# Patient Record
Sex: FEMALE | Race: WHITE | NOT HISPANIC OR LATINO | Employment: FULL TIME | URBAN - METROPOLITAN AREA
[De-identification: names, ages, dates, MRNs, and addresses within clinical notes are randomized per-mention and may not be internally consistent; named-entity substitution may affect disease eponyms.]

---

## 2019-05-24 ENCOUNTER — OFFICE VISIT - HEALTHEAST (OUTPATIENT)
Dept: CARDIOLOGY | Facility: CLINIC | Age: 39
End: 2019-05-24

## 2019-05-24 DIAGNOSIS — R07.89 OTHER CHEST PAIN: ICD-10-CM

## 2019-05-24 DIAGNOSIS — R00.2 PALPITATIONS: ICD-10-CM

## 2019-05-24 ASSESSMENT — MIFFLIN-ST. JEOR: SCORE: 1321.57

## 2019-05-28 ENCOUNTER — COMMUNICATION - HEALTHEAST (OUTPATIENT)
Dept: CARDIOLOGY | Facility: CLINIC | Age: 39
End: 2019-05-28

## 2019-06-11 ENCOUNTER — COMMUNICATION - HEALTHEAST (OUTPATIENT)
Dept: CARDIOLOGY | Facility: CLINIC | Age: 39
End: 2019-06-11

## 2019-07-15 ENCOUNTER — OFFICE VISIT (OUTPATIENT)
Dept: SLEEP MEDICINE | Facility: CLINIC | Age: 39
End: 2019-07-15
Payer: COMMERCIAL

## 2019-07-15 VITALS
HEART RATE: 62 BPM | HEIGHT: 69 IN | BODY MASS INDEX: 18.96 KG/M2 | RESPIRATION RATE: 14 BRPM | SYSTOLIC BLOOD PRESSURE: 113 MMHG | OXYGEN SATURATION: 100 % | DIASTOLIC BLOOD PRESSURE: 74 MMHG | WEIGHT: 128 LBS

## 2019-07-15 DIAGNOSIS — G47.33 OSA (OBSTRUCTIVE SLEEP APNEA): Primary | ICD-10-CM

## 2019-07-15 PROCEDURE — 99205 OFFICE O/P NEW HI 60 MIN: CPT | Performed by: PSYCHIATRY & NEUROLOGY

## 2019-07-15 RX ORDER — MULTIPLE VITAMINS W/ MINERALS TAB 9MG-400MCG
1 TAB ORAL DAILY
COMMUNITY

## 2019-07-15 ASSESSMENT — MIFFLIN-ST. JEOR: SCORE: 1325.21

## 2019-07-15 NOTE — PATIENT INSTRUCTIONS
10,000 lux light box use it or sunlight for 60 minutes in the AM.   Dim light in the evening, blue light blocking software in the evening.     Dr Olivo to evaluate nasal breathing.   Dr Flood or others for sleep dentistry.

## 2019-07-15 NOTE — PROGRESS NOTES
Visit Date:   07/15/2019      Ms. Dudley is 38 years old.  She has a history of nonrestorative sleep dating back several years.  In addition to that, she also has a history of parasomnia behaviors and possible sleep disordered breathing.      I had a long conversation today with Ms. Dudley.  We discussed numerous aspects of her sleep wakefulness, as well as came up with a strategy to try to address them.  They are listed below.      1.  The patient has been given a diagnosis of obstructive sleep apnea.  I reviewed her reports from [place], which was over a decade ago, which indicates that she had an apnea-hypopnea index of 7 events an hour on at least one of the studies.  She indicates that she was told at the time that she should consider either upper airway surgery or possible continuous positive airway pressure therapy.  She thought the experience was bizarre, however, because a fellow met her in the hallway subsequently and advised her not to undergo surgery.  She then tried CPAP did not find it particularly beneficial, thought this was primarily related to her difficulties with nasal obstruction.      We discussed the possibility of sleep disordered breathing and untreated sleep apnea on her cardiovascular health.  At this point, a decade later, she has not been treating sleep apnea in any significant way since then.  Of note, if the study from 10 years ago is to be believed, which seems reasonable, she had an apnea-hypopnea index which is very low on the mild end of sleep apnea.  While it may be causing some degree of cardiovascular risk, overall, the risks here would be quite mild.  She does not describe excessive daytime sleepiness.  She indicates that as far she knows, she does snore, but according to her  she does not have distressful labored breathing or witnessed apneas.  After a long consideration, we ultimately decided on the following:  She will visit with Dr. Sanjuana Olivo for nasal  endoscopy to evaluate whether or not her deviated septum or other airway obstruction but could be contributing to her difficulty breathing as well as nocturnal breathing.  In addition to that, I showed her a dental appliance and she was quite intrigued by oral appliance therapy.  I will refer her to our colleagues in Dental Sleep Medicine.  In addition to that, she will continue to sleep laterally.      2.  She does have a couple of traumatic episodes of dream enactment.  She recalls 1 episode about a decade ago she was dreaming that she was Popeye Spencer and she was jumping from 1 train car to another and she leapt out of bed.  On other occasions, more recently, she has been kicking quite strongly and kicked her  and usually recalls that either someone is attacking her and she needs to kick them off.  She admits that this is only very rare.  When we look closely at the background, it is fairly clear that many of these events happened after she was started on fluoxetine.  She does have difficulty with constipation, but no difficulty with hyposmia.  I have a very low concern for idiopathic REM sleep behavior disorder at this point, especially since her dream enactment, if anything, has gotten better over the course of the last 10 years.  She does not describe any injurious events in the last 5-10 years or any near injurious events since the episode where she was Popeye Spencer.  In addition to that, addressing sleep disordered breathing, as mentioned above, should help with that as well.  So for now, we will hold off on any more aggressive treatment.  We can consider melatonin and/or clonazepam but first I would like to actually confirm that with an overnight polysomnogram, which I do not think is necessary at this point.      3.  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  If given the opportunity would go to bed later and sleep in later.  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  Because of that, we discussed strategies of bright light therapy in the morning, decreased light in the evening.  Could consider melatonin in the future, but will hold off on that for now.  In addition to that, her nearly 3-year-old son also pretty clearly has a bit of a circadian rhythm delay as well, and bright light therapy will likely help out with him as well.      The patient will see Dr. Olivo, will see our colleagues in Dental Sleep Medicine, will try the bright light therapy in the morning, and keep an eye on dream enactment, and we will discuss all of these issues when she has a followup visit with me over the phone in about 8 weeks.      It was a great privilege being asked to participate in her care.  I wish her the best.  The patient has been advised to never operating a motor vehicle if tired or sleepy.      Over 60 minutes were spent with the patient today, greater than 50% of the time in counseling and coordination of care.         ALISA TURNER MD             D: 07/15/2019   T: 07/15/2019   MT: GASTON      Name:     UMANG HUTCHINSON   MRN:      [id number]        Account:      [id number]   :      1980           Visit Date:   07/15/2019      Document: [id number]

## 2019-07-15 NOTE — NURSING NOTE
"Chief Complaint   Patient presents with     Sleep Problem     History of sleep apnea and other sleep disruptions       Initial /74   Pulse 62   Resp 14   Ht 1.745 m (5' 8.7\")   Wt 58.1 kg (128 lb)   SpO2 100%   BMI 19.07 kg/m   Estimated body mass index is 19.07 kg/m  as calculated from the following:    Height as of this encounter: 1.745 m (5' 8.7\").    Weight as of this encounter: 58.1 kg (128 lb).    Medication Reconciliation: complete    Neck circumference: 12.5 inches / 32 centimeters.    ESS 12    Nohemi Dhaliwal MA      "

## 2019-07-18 NOTE — TELEPHONE ENCOUNTER
FUTURE VISIT INFORMATION      FUTURE VISIT INFORMATION:    Date: 8/1/19    Time: 8:00 am    Location: [facility]  REFERRAL INFORMATION:    Referring provider:  Dr. Sherwin Eduardo    Referring providers clinic:  [facility]    Reason for visit/diagnosis  CRISTINA    RECORDS REQUESTED FROM:       Clinic name Comments Records Status Imaging Status   [facility] Office Visit-7/15/19-Dr. Sherwin Eduardo [facility] ENT Office Visit-5/2/13, 5/21/12-Dr. Court Ruiz [facility]  Office Visit-11/7/08 Report in [facility] Sleep Study-10/5/08 Sent to scan                  Action    Action Taken 7/18/2019-11:37 AM-Faxed request for records to [facility] at [phone]. Records phone # is [phone] PB  7/19/19-Received fax stating they have no record of that patient.  Resent fax with maiden name of Sujit.  PB  7/25/19-Received sleep study from [facility] under the last name of Sujit.  Sent to scan.  PB

## 2019-08-01 ENCOUNTER — OFFICE VISIT (OUTPATIENT)
Dept: OTOLARYNGOLOGY | Facility: CLINIC | Age: 39
End: 2019-08-01
Payer: COMMERCIAL

## 2019-08-01 ENCOUNTER — PRE VISIT (OUTPATIENT)
Dept: OTOLARYNGOLOGY | Facility: CLINIC | Age: 39
End: 2019-08-01

## 2019-08-01 VITALS
DIASTOLIC BLOOD PRESSURE: 74 MMHG | HEIGHT: 69 IN | BODY MASS INDEX: 18.96 KG/M2 | SYSTOLIC BLOOD PRESSURE: 113 MMHG | WEIGHT: 128 LBS

## 2019-08-01 DIAGNOSIS — J34.2 DEVIATED NASAL SEPTUM: ICD-10-CM

## 2019-08-01 DIAGNOSIS — G47.33 OSA (OBSTRUCTIVE SLEEP APNEA): Primary | ICD-10-CM

## 2019-08-01 DIAGNOSIS — R09.81 NASAL CONGESTION: ICD-10-CM

## 2019-08-01 DIAGNOSIS — J34.3 NASAL TURBINATE HYPERTROPHY: ICD-10-CM

## 2019-08-01 RX ORDER — CLOMIPHENE CITRATE 50 MG/1
TABLET ORAL
Refills: 2 | COMMUNITY
Start: 2019-06-29 | End: 2021-06-23

## 2019-08-01 ASSESSMENT — MIFFLIN-ST. JEOR: SCORE: 1325.21

## 2019-08-01 NOTE — PROGRESS NOTES
Otolaryngology Adult Consultation    Patient: Samantha Dudley  : 1980    HPI:  Samantha Dudley is a 38 year old female seen today in the Otolaryngology Clinic for nasal evaluation and CRISTINA.  Patient had a sleep study done about 10 years ago at [place].  Overall AHI was 7.  She did not tolerate CPAP.  She is gone untreated since then.  She recently went back and saw  and sleep medicine.  He recommended considering an oral appliance.  Additionally he recommended ENT evaluation of her nose.  She does have complaints about her nasal breathing particularly on the right side.  Patient does have some issues with insomnia and sleep maintenance insomnia.  She recognizes that it is very difficult for her to turn her brain off at night at times.  She is very anxious.  It is things that she continues to work on.  But she also feels like her sleep is incredibly unrefreshing and she wonders how much of her overall breathing at night is playing into that.    In terms of her nose she has been seen by Dr. Court Ruiz previously back in .  She noted a deviated septum to the right.  Patient reports that she has been on Flonase multiple times.  She mainly takes it during the winter when she feels like she has more issues with rhinitis and sinusitis.  However it never seems to truly improve her nasal breathing.  She been hesitant previously to consider surgery though that was offered to her.  She is considering it more seriously now.  She believes she has had nasal trauma as a child but does not ever recall breaking her nose.  She has no issues with facial pain or pressure on a daily basis.  She is no issues with epistaxis.    Medications:  Current Outpatient Rx   Medication Sig Dispense Refill     clomiPHENE (CLOMID) 50 MG tablet TK 1 T PO QD FOR 5 DAYS STARTING ON CYCLE DAY 3  2     FLUoxetine (PROZAC) 10 MG capsule Take 30 mg by mouth daily.       multivitamin w/minerals (MULTI-VITAMIN) tablet Take 1 tablet by  "mouth daily       Omega-3 Fatty Acids (FISH OIL PO) Take by mouth daily         Allergies: No clinical screening - see comments     PMH:  Past Medical History:   Diagnosis Date     Brachial neuritis 2011    resolved with P     Cervical dysplasia 2010    treated in [place], normal f/u paps     Eating disorder     previously on Prozac     Generalized anxiety disorder 6/4/2013    Problem resolved at pt request  that it be removed from problem list.      Hoarseness      Menarche age 14    cycles q mo x 7d, heavy, w cramps     Nasal congestion      Neck pain 4/1/2011     Oral contraceptive use age 18    for cycle control     Sore throat      Trouble breathing     At night       PSH:  Past Surgical History:   Procedure Laterality Date     CONIZATION LEEP  2010    \"most painful thing I've ever had\"     perianal lesion excision  child    scar on R @ 2:00 -- benign lesion      [place] teeth extraction  18       FH:  Family History   Problem Relation Age of Onset     Lipids Father      Breast Cancer Maternal Aunt 50     Ovarian Cancer Mother 58        recurrance Xs 4     C.A.D. No family hx of      Diabetes No family hx of      Hypertension Father 72     Cerebrovascular Disease No family hx of      Cancer - colorectal No family hx of      Prostate Cancer No family hx of      Alcohol/Drug Paternal Grandfather      Depression Mother 50     Depression Sister 24     Depression Brother 24     Thyroid Disease No family hx of         SH:  Social History     Tobacco Use     Smoking status: Never Smoker     Smokeless tobacco: Never Used   Substance Use Topics     Alcohol use: Yes     Comment: 2-3 drinks 1x/wk max     Drug use: No       Review of Systems  [facility] ROS 8/1/2019   Constitutional Problems with sleep, Unexplained fever or night sweats   Gastrointestinal/Genitourinary Constipation   Endocrine Frequent urination       Physical Exam:    GEN:  The patient is alert, oriented and in no acute distress.  HEAD:  Head, face scalp " is grossly normal.  NOSE: Intranasally the patient has a right caudal septal deflection.  It does narrow her right nostril.  She has bilateral enlarged inferior turbinates.    O/C: Modified mallampati 3.  Tonsils are 1+.  Relatively open oropharynx.        Laryngoscopy is performed to examine the upper airway for pathology, functional or anatomic abnormality that might be contributing to her obstructive sleep apnea..  After application of topical anesthetic/decongestant solution the flexible endoscope was passed into each nasal cavity separately.  Findings are as follows:   Nasal passages show a deflection of the septum to the right caudally as well as posteriorly by the right middle turbinate.  It does narrow the right middle meatus fairly significantly.  Do not see any purulence or erythema or edema of the right middle meatus.  She does have large inferior turbinates bilaterally.   Nasopharynx:  Clear, no lesions, crusting or inflammation   Base of tongue, vallecula, epiglottis, aryepiglottic folds, false vocal folds without mucosal lesions, masses or anatomic abnormality.   Glottis with normal movement of vocal folds, normal mucosa and no lesions   Pyriform sinuses, post-cricoid area, and posterior pharyngeal wall without lesions      Assessment/Plan: Patient presents with a history of mild obstructive sleep apnea and nasal congestion/restriction.  I discussed with her what I found on her nasal exam.  She does have a septal deviation to the right both anteriorly as well as mid posteriorly by the middle meatus.  She is large inferior turbinates bilaterally.  I do think both of these things do contribute to her difficulty with nasal breathing.  Further down I do not see any obvious airway obstructions in terms of her tonsils tongue or larynx.  I would not recommend upper airway surgery for her obstructive sleep apnea at this time.  Do think that she should look into oral appliance.  She is having a little  difficulty with coverage but she is potentially switching jobs soon.    In terms of her nose I think she may benefit from a septoplasty and turbinate reduction to improve her daytime nasal breathing.  We discussed what involved in surgeries as well as risks of the surgery.  Risks of surgery include septal perforation and failure to improve nasal breathing.  I believe that risk of a septal perforation is relatively low.  Biggest risk that she would take with surgery is failure to improve her nasal breathing.  We discussed recovery and as well as Howard splints.  Patient is going to consider her options.  She is leaning towards septoplasty but I recommended she wait till she knew if she is going to change insurances before scheduling.  She can call us once she has her schedule set.  We will need to place orders at that time.    I spent a total of 35 minutes face-to-face with Samantha Dudley during today's office visit.  Over 50% of this time was spent counseling the patient on and/or coordinating care as documented in my assessment and plan.

## 2019-08-01 NOTE — PATIENT INSTRUCTIONS
You were seen in the ENT clinic today with Dr. Olivo.     Recommendations for you:    -Please let us know if you would like to proceed with surgery. Today we talked about a Septoplasty and Turbinate Reduction. Please contact Lyric-the surgery coordinator for Dr. Olivo if you would like to schedule.     Oowqtb-[phone]    The dentist recommended by Dr. Olivo today: Leander Cortés      Please call our clinic for any questions, concerns, and/or worsening symptoms.      Clinic #[phone]       Option 1 for scheduling.    Thank you for allowing us to be apart of your care!    Kirsten LOMAS, RNCC    If you need to reach me my direct line is: [phone]

## 2019-08-01 NOTE — LETTER
2019       RE: Samantha Dudley  [address]  [place] [address]     Dear Colleague,    Thank you for referring your patient, Samantha Dudley, to the [facility] EAR NOSE AND THROAT at [facility]. Please see a copy of my visit note below.    Otolaryngology Adult Consultation    Patient: Samantha Dudley  : 1980    HPI:  Samantha Dudley is a 38 year old female seen today in the Otolaryngology Clinic for nasal evaluation and CRISTINA.  Patient had a sleep study done about 10 years ago at [place].  Overall AHI was 7.  She did not tolerate CPAP.  She is gone untreated since then.  She recently went back and saw  and sleep medicine.  He recommended considering an oral appliance.  Additionally he recommended ENT evaluation of her nose.  She does have complaints about her nasal breathing particularly on the right side.  Patient does have some issues with insomnia and sleep maintenance insomnia.  She recognizes that it is very difficult for her to turn her brain off at night at times.  She is very anxious.  It is things that she continues to work on.  But she also feels like her sleep is incredibly unrefreshing and she wonders how much of her overall breathing at night is playing into that.    In terms of her nose she has been seen by Dr. Court Ruiz previously back in .  She noted a deviated septum to the right.  Patient reports that she has been on Flonase multiple times.  She mainly takes it during the winter when she feels like she has more issues with rhinitis and sinusitis.  However it never seems to truly improve her nasal breathing.  She been hesitant previously to consider surgery though that was offered to her.  She is considering it more seriously now.  She believes she has had nasal trauma as a child but does not ever recall breaking her nose.  She has no issues with facial pain or pressure on a daily basis.  She is no issues with  "epistaxis.    Medications:  Current Outpatient Rx   Medication Sig Dispense Refill     clomiPHENE (CLOMID) 50 MG tablet TK 1 T PO QD FOR 5 DAYS STARTING ON CYCLE DAY 3  2     FLUoxetine (PROZAC) 10 MG capsule Take 30 mg by mouth daily.       multivitamin w/minerals (MULTI-VITAMIN) tablet Take 1 tablet by mouth daily       Omega-3 Fatty Acids (FISH OIL PO) Take by mouth daily         Allergies: No clinical screening - see comments     PMH:  Past Medical History:   Diagnosis Date     Brachial neuritis 2011    resolved with P     Cervical dysplasia 2010    treated in [place], normal f/u paps     Eating disorder     previously on Prozac     Generalized anxiety disorder 6/4/2013    Problem resolved at pt request  that it be removed from problem list.      Hoarseness      Menarche age 14    cycles q mo x 7d, heavy, w cramps     Nasal congestion      Neck pain 4/1/2011     Oral contraceptive use age 18    for cycle control     Sore throat      Trouble breathing     At night       PSH:  Past Surgical History:   Procedure Laterality Date     CONIZATION LEEP  2010    \"most painful thing I've ever had\"     perianal lesion excision  child    scar on R @ 2:00 -- benign lesion      [place] teeth extraction  18       FH:  Family History   Problem Relation Age of Onset     Lipids Father      Breast Cancer Maternal Aunt 50     Ovarian Cancer Mother 58        recurrance Xs 4     C.A.D. No family hx of      Diabetes No family hx of      Hypertension Father 72     Cerebrovascular Disease No family hx of      Cancer - colorectal No family hx of      Prostate Cancer No family hx of      Alcohol/Drug Paternal Grandfather      Depression Mother 50     Depression Sister 24     Depression Brother 24     Thyroid Disease No family hx of         SH:  Social History     Tobacco Use     Smoking status: Never Smoker     Smokeless tobacco: Never Used   Substance Use Topics     Alcohol use: Yes     Comment: 2-3 drinks 1x/wk max     Drug use: No "       Review of Systems   ENT ROS 8/1/2019   Constitutional Problems with sleep, Unexplained fever or night sweats   Gastrointestinal/Genitourinary Constipation   Endocrine Frequent urination       Physical Exam:    GEN:  The patient is alert, oriented and in no acute distress.  HEAD:  Head, face scalp is grossly normal.  NOSE: Intranasally the patient has a right caudal septal deflection.  It does narrow her right nostril.  She has bilateral enlarged inferior turbinates.    O/C: Modified mallampati 3.  Tonsils are 1+.  Relatively open oropharynx.        Laryngoscopy is performed to examine the upper airway for pathology, functional or anatomic abnormality that might be contributing to her obstructive sleep apnea..  After application of topical anesthetic/decongestant solution the flexible endoscope was passed into each nasal cavity separately.  Findings are as follows:   Nasal passages show a deflection of the septum to the right caudally as well as posteriorly by the right middle turbinate.  It does narrow the right middle meatus fairly significantly.  Do not see any purulence or erythema or edema of the right middle meatus.  She does have large inferior turbinates bilaterally.   Nasopharynx:  Clear, no lesions, crusting or inflammation   Base of tongue, vallecula, epiglottis, aryepiglottic folds, false vocal folds without mucosal lesions, masses or anatomic abnormality.   Glottis with normal movement of vocal folds, normal mucosa and no lesions   Pyriform sinuses, post-cricoid area, and posterior pharyngeal wall without lesions      Assessment/Plan: Patient presents with a history of mild obstructive sleep apnea and nasal congestion/restriction.  I discussed with her what I found on her nasal exam.  She does have a septal deviation to the right both anteriorly as well as mid posteriorly by the middle meatus.  She is large inferior turbinates bilaterally.  I do think both of these things do contribute to her  difficulty with nasal breathing.  Further down I do not see any obvious airway obstructions in terms of her tonsils tongue or larynx.  I would not recommend upper airway surgery for her obstructive sleep apnea at this time.  Do think that she should look into oral appliance.  She is having a little difficulty with coverage but she is potentially switching jobs soon.    In terms of her nose I think she may benefit from a septoplasty and turbinate reduction to improve her daytime nasal breathing.  We discussed what involved in surgeries as well as risks of the surgery.  Risks of surgery include septal perforation and failure to improve nasal breathing.  I believe that risk of a septal perforation is relatively low.  Biggest risk that she would take with surgery is failure to improve her nasal breathing.  We discussed recovery and as well as Howard splints.  Patient is going to consider her options.  She is leaning towards septoplasty but I recommended she wait till she knew if she is going to change insurances before scheduling.  She can call us once she has her schedule set.  We will need to place orders at that time.    I spent a total of 35 minutes face-to-face with Samantha Dudley during today's office visit.  Over 50% of this time was spent counseling the patient on and/or coordinating care as documented in my assessment and plan.        Again, thank you for allowing me to participate in the care of your patient.      Sincerely,    Sanjuana Olivo MD

## 2019-11-18 ENCOUNTER — COMMUNICATION - HEALTHEAST (OUTPATIENT)
Dept: CARDIOLOGY | Facility: CLINIC | Age: 39
End: 2019-11-18

## 2019-11-18 DIAGNOSIS — R07.1 CHEST PAIN ON BREATHING: ICD-10-CM

## 2019-11-18 DIAGNOSIS — R00.2 PALPITATIONS: ICD-10-CM

## 2019-12-03 ENCOUNTER — HOSPITAL ENCOUNTER (OUTPATIENT)
Dept: CARDIOLOGY | Facility: CLINIC | Age: 39
Discharge: HOME | End: 2019-12-03
Attending: INTERNAL MEDICINE

## 2019-12-03 DIAGNOSIS — R00.2 PALPITATIONS: ICD-10-CM

## 2019-12-03 DIAGNOSIS — R07.1 CHEST PAIN ON BREATHING: ICD-10-CM

## 2019-12-03 LAB
CV STRESS CURRENT BP HE: NORMAL
CV STRESS CURRENT HR HE: 102
CV STRESS CURRENT HR HE: 106
CV STRESS CURRENT HR HE: 107
CV STRESS CURRENT HR HE: 109
CV STRESS CURRENT HR HE: 110
CV STRESS CURRENT HR HE: 110
CV STRESS CURRENT HR HE: 115
CV STRESS CURRENT HR HE: 120
CV STRESS CURRENT HR HE: 121
CV STRESS CURRENT HR HE: 134
CV STRESS CURRENT HR HE: 137
CV STRESS CURRENT HR HE: 144
CV STRESS CURRENT HR HE: 145
CV STRESS CURRENT HR HE: 152
CV STRESS CURRENT HR HE: 155
CV STRESS CURRENT HR HE: 155
CV STRESS CURRENT HR HE: 158
CV STRESS CURRENT HR HE: 160
CV STRESS CURRENT HR HE: 161
CV STRESS CURRENT HR HE: 77
CV STRESS CURRENT HR HE: 87
CV STRESS CURRENT HR HE: 90
CV STRESS CURRENT HR HE: 90
CV STRESS CURRENT HR HE: 91
CV STRESS CURRENT HR HE: 91
CV STRESS CURRENT HR HE: 93
CV STRESS CURRENT HR HE: 93
CV STRESS DEVIATION TIME HE: NORMAL
CV STRESS ECHO PERCENT HR HE: NORMAL
CV STRESS EXERCISE STAGE HE: NORMAL
CV STRESS EXERCISE STAGE REACHED HE: NORMAL
CV STRESS FINAL RESTING BP HE: NORMAL
CV STRESS FINAL RESTING HR HE: 90
CV STRESS MAX HR HE: 161
CV STRESS MAX TREADMILL GRADE HE: 16
CV STRESS MAX TREADMILL SPEED HE: 4.2
CV STRESS PEAK DIA BP HE: NORMAL
CV STRESS PEAK SYS BP HE: NORMAL
CV STRESS PHASE HE: NORMAL
CV STRESS PROTOCOL HE: NORMAL
CV STRESS RESTING PT POSITION HE: NORMAL
CV STRESS RESTING PT POSITION HE: NORMAL
CV STRESS ST DEVIATION AMOUNT HE: NORMAL
CV STRESS ST DEVIATION ELEVATION HE: NORMAL
CV STRESS ST EVELATION AMOUNT HE: NORMAL
CV STRESS TEST TYPE HE: NORMAL
CV STRESS TOTAL STAGE TIME MIN 1 HE: NORMAL
RATE PRESSURE PRODUCT: NORMAL
STRESS ANGINA INDEX: 0
STRESS ECHO BASELINE DIASTOLIC HE: 51
STRESS ECHO BASELINE HR: 82
STRESS ECHO BASELINE SYSTOLIC BP: 107
STRESS ECHO CALCULATED PERCENT HR: 88 %
STRESS ECHO LAST STRESS DIASTOLIC BP: 58
STRESS ECHO LAST STRESS HR: 161
STRESS ECHO LAST STRESS SYSTOLIC BP: 124
STRESS ECHO POST ESTIMATED WORKLOAD: 11.3
STRESS ECHO POST EXERCISE DUR MIN: 9
STRESS ECHO POST EXERCISE DUR SEC: 45
STRESS ECHO TARGET HR: 182

## 2020-12-05 LAB
ABO + RH BLD: NORMAL
ABO + RH BLD: NORMAL
BLD GP AB SCN SERPL QL: NORMAL
HEMOGLOBIN: 13.1 G/DL (ref 11.7–15.7)

## 2021-01-14 ENCOUNTER — COMMUNICATION - HEALTHEAST (OUTPATIENT)
Dept: CARDIOLOGY | Facility: CLINIC | Age: 41
End: 2021-01-14

## 2021-03-01 LAB — HCT VFR BLD AUTO: 36.1 %

## 2021-03-03 LAB
C TRACH DNA SPEC QL PROBE+SIG AMP: NORMAL
N GONORRHOEA DNA SPEC QL PROBE+SIG AMP: NORMAL

## 2021-03-05 ENCOUNTER — TELEPHONE (OUTPATIENT)
Dept: MATERNAL FETAL MEDICINE | Facility: CLINIC | Age: 41
End: 2021-03-05

## 2021-03-05 DIAGNOSIS — O35.9XX0 SUSPECTED FETAL ANOMALY, ANTEPARTUM, SINGLE OR UNSPECIFIED FETUS: Primary | ICD-10-CM

## 2021-03-05 NOTE — TELEPHONE ENCOUNTER
Spoke with pt to discuss follow up appointments on 3/12/21. Pt is scheduled @ 1000 for fetal MRI [facility], then will come to [facility] at 1245 for GC, RL2 and MFM consult (possible amniocentesis).  Fetal Echo is scheduled for 3/25/21 @ 1pm.    Samantha is agreeable to appointment dates and times. Map and summary of appointments along with where to check in, emailed to [email or web address] per pt request. PCC number given to call with any questions of concerns. Dr. Angela updated Dr. Chen about plan of care.   Lissa Vincent RN

## 2021-03-10 ENCOUNTER — PRE VISIT (OUTPATIENT)
Dept: MATERNAL FETAL MEDICINE | Facility: CLINIC | Age: 41
End: 2021-03-10

## 2021-03-11 NOTE — PROGRESS NOTES
[facility] [place]  Genetic Counseling Consult    Patient: Samantha Dudley YOB: 1980   Date of Service:  3/12/21      Samantha Dudley was seen at the [facility] [place] for genetic consultation given abnormal ultrasound findings. Samantha was accompanied to today's appointment by her , Tez.      Impression/Plan:   Samantha was referred to the [facility] clinic after the identification of fetal hemivertebrae on prenatal ultrasound at [facility] on 3/4/21. No other structural fetal anomalies were identified on ultrasound. Thus far the pregnancy has had a normal fetal echocardiogram and low-risk cell-free DNA analysis. Fetal MRI was performed this morning and were consistent with the ultrasound findings.    This pregnancy was conceived via frozen embryo transfer of one embryo on 10/12/20. A 27 year old egg donor was used. Preimplantation genetic screening was not performed on this embryo.      Today, I reviewed the hemivertebrae ultrasound finding and associated genetic conditions. We discussed both prenatal and  diagnostic genetic testing options including an amniocentesis vs cordblood analysis at delivery if indicated. After reviewing the benefits, risks, and limitations of all options, Samantha declined an amniocentesis today.     Pregnancy History:   /Parity:                            Age at Delivery: 40 year old  HARPREET: 2021, by Embryo Transfer                  Gestational Age: 24w1d  -  No significant complications or exposures were reported in the current pregnancy.  -  Samantha guevara pregnancy history is significant for:   -Term  male 2016    Medical History:   Samantha guevara reported medical history is not expected to impact pregnancy management or risks to fetal development.       Family History:   A three-generation pedigree was obtained, and is scanned under the  Media  tab.   The reported family  history is negative for multiple miscarriages, stillbirths, birth defects, cognitive impairment, known genetic conditions, and consanguinity.       Carrier Screening:   The patient reports that the father of the pregnancy has  ancestry:      Cystic fibrosis is an autosomal recessive genetic condition that occurs with increased frequency in individuals of  ancestry and carrier screening for this condition is available.  In addition,  screening in the [facility] includes cystic fibrosis.        Expanded carrier screening for mutations in a large panel of genes associated with autosomal recessive conditions including cystic fibrosis, spinal muscular atrophy, and others, is now available.      Carrier screening was beyond the scope of our discussion today.        Risk Assessment:   We explained that the risk for fetal chromosome abnormalities increases with maternal age. We discussed specific features of common chromosome abnormalities, including Down syndrome, trisomy 13, trisomy 18, and sex chromosome trisomies.      - At age 40 at delivery, the risk to have a baby with Down syndrome is 1 in 137.    - At age 40 at delivery, the risk to have a baby with any chromosome abnormality is 1 in 82.       Samantha was referred to the [facility] clinic after the identification of fetal hemivertebrae on prenatal ultrasound at [facility] on 3/4/21. No other structural fetal anomalies were identified on ultrasound. Thus far the pregnancy has had a normal fetal echocardiogram and low-risk cell-free DNA analysis. Fetal MRI was performed this morning and results are pending.    Hemivertebrae is the ultrasound finding when half of the vertebra in the spine does not form. This can cause scoliosis. This finding does not increase the chance of a full chromosome aneuploidy, though deletions of chromosome 7q have been reported to have hemivertebra in addition to other anomalies. There are  associated genetic syndromes that include Jarcho-Tucker, Klippel-Feil, VACTERL, and OEIS. Other structural abnormalities such as musculoskeletal, genitourinary, and cardiac anomalies are also identified in approximately 70% of hemivertebra cases. Samantha and her partner, Tez, stated that they did not want to learn details of the genetic syndromes at this point, so discussion of the features of these conditions was not done today.    Prognosis for isolated hemivertebra is good. In 75% of cases there is little or no progression of scoliosis. In the other 25%, there is rapid progression at 2-3 years of age. If this finding is caused by an underlying autosomal recessive genetic condition, there is as high as a 25% recurrence risk. If this finding is caused by an underlying autosomal dominant genetic condition, there is as high as a 50% recurrence risk if inherited.    We discussed the option of prenatal diagnostic testing via an amniocentesis.  I reviewed the following information with Samantha:    Genetic Amniocentesis  - This is an invasive procedure typically performed at 15 weeks or later, through which amniotic fluid is obtained for the purpose of chromosome analysis and/or other prenatal genetic analysis.  - Amniocentesis is considered a diagnostic test for chromosome problems during pregnancy.  - The risk of complications including pregnancy loss associated with amniocentesis is generally estimated to be 1/300-1/500.  - Amniotic fluid AFP assessment available to screen for the possibility of open neural tube defects    We discussed FISH, G-bands, Microarray, and targeted panel testing could be done on fetal DNA from the amniotic fluid obtained via amniocentesis.    Fluorescence In Situ Hybridization (FISH) analysis is a preliminary targeted chromosome analysis that determines how many copies of chromosome 13, 18, and 21 the baby's DNA has. FISH also analyzes the sex chromosomes to determine how many X chromosomes and Y  chromosomes are present. We reviewed that this analysis is concordant with the limited g-bands and array CGH analysis (described below) 98% of the time.      Limited G-band analysis determines the amount and arrangement of an individual's chromosomes. This testing can identify if there are extra or missing chromosomes. This analysis can confirm or rule out Trisomy 13, Trisomy 18, and Trisomy 21 (Down Syndrome).     Array CGH analysis looks for small extra or missing pieces of DNA.  Chromosomal deletions and duplications may cause problems with an individual's health and development including learning disabilities, developmental delays, physical differences, and psychiatric challenges.  The specific symptoms would depend on the specific difference in the DNA and what genes are involved.      We reviewed the benefits, limitations, and possible results from CGH / SNP analysis which can include:    Negative: No extra or missing pieces of DNA were seen.     Positive: A deletion or duplication in the DNA was seen that is known to be associated with a particular set of symptoms or known syndrome.     Variant of uncertain significance (VUS): A deletion or duplication in the DNA was seen, but it is not known if it explains the symptoms.    I informed Samantha that this testing can be done after delivery on cordblood as well. Some couples choose to decline amniocentesis to mitigate risks to the pregnancy. A genetics evaluation could be made available postnatally if indicated to determine an appropriate genetic testing plan. At this time, Samantha declined amniocentesis.      Testing Options:   We discussed the following options:   Non-invasive Prenatal Testing (NIPT)    Maternal plasma cell-free DNA testing; first trimester ultrasound with nuchal translucency and nasal bone assessment is recommended, when appropriate    Screens for fetal trisomy 21, trisomy 13, trisomy 18, and sex chromosome aneuploidy    Cannot screen for open  neural tube defects; maternal serum AFP after 15 weeks is recommended       Genetic Amniocentesis    Invasive procedure typically performed in the second trimester by which amniotic fluid is obtained for the purpose of chromosome analysis and/or other prenatal genetic analysis    Diagnostic results; >99% sensitivity for fetal chromosome abnormalities    AFAFP measurement tests for open neural tube defects       Comprehensive (Level II) ultrasound: Detailed ultrasound performed between 18-22 weeks gestation to screen for major birth defects and markers for aneuploidy.      We reviewed the benefits and limitations of this testing.  Screening tests provide a risk assessment specific to the pregnancy for certain fetal chromosome abnormalities, but cannot definitively diagnose or exclude a fetal chromosome abnormality.  Follow-up genetic counseling and consideration of diagnostic testing is recommended with any abnormal screening result.     Diagnostic tests carry inherent risks- including risk of miscarriage- that require careful consideration.  These tests can detect fetal chromosome abnormalities with greater than 99% certainty.  Results can be compromised by maternal cell contamination or mosaicism, and are limited by the resolution of cytogenetic G-banding technology.  There is no screening nor diagnostic test that can detect all forms of birth defects or mental disability.    It was a pleasure to be involved with Samantha [facility]. Face-to-face time of the meeting was 40 minutes.    Jennifer Anaya MS, [facility]  Genetic Counselor  Maternal Fetal Medicine  [facility]   Phone: [phone]  Pager: [phone]  Email: [email or web address]

## 2021-03-12 ENCOUNTER — OFFICE VISIT (OUTPATIENT)
Dept: MATERNAL FETAL MEDICINE | Facility: CLINIC | Age: 41
End: 2021-03-12
Attending: OBSTETRICS & GYNECOLOGY
Payer: COMMERCIAL

## 2021-03-12 ENCOUNTER — HOSPITAL ENCOUNTER (OUTPATIENT)
Dept: ULTRASOUND IMAGING | Facility: CLINIC | Age: 41
End: 2021-03-12
Attending: OBSTETRICS & GYNECOLOGY
Payer: COMMERCIAL

## 2021-03-12 ENCOUNTER — HOSPITAL ENCOUNTER (OUTPATIENT)
Dept: MRI IMAGING | Facility: CLINIC | Age: 41
End: 2021-03-12
Attending: OBSTETRICS & GYNECOLOGY
Payer: COMMERCIAL

## 2021-03-12 DIAGNOSIS — O35.9XX0 SUSPECTED FETAL ANOMALY, ANTEPARTUM, SINGLE OR UNSPECIFIED FETUS: Primary | ICD-10-CM

## 2021-03-12 DIAGNOSIS — O35.9XX0 SUSPECTED FETAL ANOMALY, ANTEPARTUM, SINGLE OR UNSPECIFIED FETUS: ICD-10-CM

## 2021-03-12 PROCEDURE — 76816 OB US FOLLOW-UP PER FETUS: CPT | Mod: 26 | Performed by: OBSTETRICS & GYNECOLOGY

## 2021-03-12 PROCEDURE — 74712 MRI FETAL SNGL/1ST GESTATION: CPT

## 2021-03-12 PROCEDURE — 96040 HC GENETIC COUNSELING, EACH 30 MINUTES: CPT | Performed by: GENETIC COUNSELOR, MS

## 2021-03-12 PROCEDURE — 76816 OB US FOLLOW-UP PER FETUS: CPT

## 2021-03-12 PROCEDURE — 74712 MRI FETAL SNGL/1ST GESTATION: CPT | Mod: 26 | Performed by: RADIOLOGY

## 2021-03-16 ENCOUNTER — TELEPHONE (OUTPATIENT)
Dept: MATERNAL FETAL MEDICINE | Facility: CLINIC | Age: 41
End: 2021-03-16

## 2021-03-16 NOTE — TELEPHONE ENCOUNTER
Phone call to Samantha to follow up on future appts. Explained the [facility] clinic system [place]//RH/JUAN JOSEW v /NM. Pt is scheduled for follow ultrasound at [facility]. Fetal echo on 3/25 [facility]. Pt care coordinator number given to Samantha to call with any questions.     Eunice Dover RN

## 2021-03-16 NOTE — PROGRESS NOTES
"Please see \"Imaging\" tab under \"Chart Review\" for details of today's visit.    Rebecca Jack    "

## 2021-03-22 ENCOUNTER — TELEPHONE (OUTPATIENT)
Dept: MATERNAL FETAL MEDICINE | Facility: CLINIC | Age: 41
End: 2021-03-22

## 2021-03-25 ENCOUNTER — OFFICE VISIT (OUTPATIENT)
Dept: CARDIOLOGY | Facility: CLINIC | Age: 41
End: 2021-03-25
Payer: COMMERCIAL

## 2021-03-25 ENCOUNTER — HOSPITAL ENCOUNTER (OUTPATIENT)
Dept: CARDIOLOGY | Facility: CLINIC | Age: 41
Discharge: HOME | End: 2021-03-25
Attending: OBSTETRICS & GYNECOLOGY | Admitting: OBSTETRICS & GYNECOLOGY
Payer: COMMERCIAL

## 2021-03-25 DIAGNOSIS — O35.9XX0 SUSPECTED FETAL ANOMALY, ANTEPARTUM, SINGLE OR UNSPECIFIED FETUS: ICD-10-CM

## 2021-03-25 DIAGNOSIS — O35.9XX0 SUSPECTED FETAL ANOMALY, ANTEPARTUM, SINGLE OR UNSPECIFIED FETUS: Primary | ICD-10-CM

## 2021-03-25 PROCEDURE — 99202 OFFICE O/P NEW SF 15 MIN: CPT | Mod: 25 | Performed by: PEDIATRICS

## 2021-03-25 PROCEDURE — 93325 DOPPLER ECHO COLOR FLOW MAPG: CPT | Mod: 26 | Performed by: PEDIATRICS

## 2021-03-25 PROCEDURE — 93325 DOPPLER ECHO COLOR FLOW MAPG: CPT

## 2021-03-25 PROCEDURE — 76827 ECHO EXAM OF FETAL HEART: CPT | Mod: 26 | Performed by: PEDIATRICS

## 2021-03-25 PROCEDURE — 76825 ECHO EXAM OF FETAL HEART: CPT | Mod: 26 | Performed by: PEDIATRICS

## 2021-03-25 NOTE — PROGRESS NOTES
Fetal Cardiology Consultation    Patient:  Samantha Dudley MRN:  [id number]   YOB: 1980 Age:  40 year old   Date of Visit:  3/25/2021 PCP:  Petrona Brunson MD   HARPREET: 6/30/2021, by Embryo Transfer EGA: 26w1d weeks     Dear Dr. Angela:    I had the pleasure of seeing Samantha Dudley at the [facility] in [place] on 3/25/2021 in consultation for fetal echocardiography results. She presented today by herself. As you know, she is a 40 year old female with fetal extracardiac anomaly on obstetrical ultrasound (hemivertebrae), without other apparent structural anomalies.    The fetal echocardiogram was normal. Normal fetal cardiac anatomy. Normal right and left ventricular size and function without hypertrophy. No evidence of diastolic dysfunction. No pericardial effusion. No arrhythmia.     I reviewed and interpreted the fetal echocardiogram today. I discussed the normal results with Ms. Dudley. While these results are normal, it is important to note that fetal echocardiography cannot exclude small atrial or ventricular septal defects, persistent ductus arteriosus, mild coarctation of the aorta, partial anomalous pulmonary venous return, minor anatomic valve anomalies, or coronary artery anomalies.     -- No additional fetal echocardiograms are recommended for this pregnancy.    Thank you for allowing me to participate in Ms. Dudley's care. Please don't hesitate to contact me or the Fetal Cardiology team at [facility] with any questions or concerns.     I spent a total of 20 minutes on the date of the encounter doing chart review, patient history, documentation, counseling, and coordinating care.    Dakotah Crain MD  Pediatric Cardiology  [facility]  Phone [phone]    Review of the result(s) of each unique test - fetal echocardiogram

## 2021-03-31 ENCOUNTER — TRANSFERRED RECORDS (OUTPATIENT)
Dept: HEALTH INFORMATION MANAGEMENT | Facility: CLINIC | Age: 41
End: 2021-03-31

## 2021-04-03 ENCOUNTER — HEALTH MAINTENANCE LETTER (OUTPATIENT)
Age: 41
End: 2021-04-03

## 2021-04-28 ENCOUNTER — TELEPHONE (OUTPATIENT)
Dept: CONSULT | Facility: CLINIC | Age: 41
End: 2021-04-28

## 2021-04-28 NOTE — TELEPHONE ENCOUNTER
LVM for patient to call back to schedule new pt Genetics appointment toward the end of July. Patient currently pregnant and appt will be switched to baby's name once baby is born (due June 30th), but scheduled under patient's chart for now. Reason for appt is fetal hemivertebrae. When patient calls back, please assist in scheduling appointment with any available Genetics MD (excluding Dr. Lee) with GC visit 30 minutes prior. Video or in person visit OK but please let patient know that visit type may be changed at provider's discretion. Thank you.

## 2021-05-29 NOTE — PATIENT INSTRUCTIONS - HE
Ms. Samantha Dudley,  It certainly was nice to meet you today.  Per our conversation you're some skipped beats in the chest.  This could represent an abnormal heartbeat and the reason I am checking the ultrasound stress of the heart and the heart monitor.  We will call you the results of these tests.   Randy Do

## 2021-05-29 NOTE — PROGRESS NOTES
[facility] Heart Care Office Consult     Assessment:     1. Palpitations -sounds like innocent PACs.  We will have her wear a 21-day ACT monitor to make sure there is no pathological arrhythmias.  If some found, we will then consider a 24-hour Holter monitor to quantitate.  If significant amount consider beta-blocker therapy.   2. Other chest pain -chest tightness under stressful situations but not with activity.  Will perform stress echo looking for structural heart disease as well as rule out ischemia.      Plan:   1.  Stress echo.  2.  21-day ACT monitor.  3.  Address if either above abnormal.  No follow-up just yet.    History of Present Illness:   Thank you for asking the [facility] Heart Care team to see Samantha Dudley a 38 y.o.  female  in consultation  to evaluate palpitations.   Patient states for the last 15 years or so she has had skipped heartbeats, may be she will feel 2 or 3 skips and no other symptoms.  But she was having some the other day and mentions her  and she schedule an appointment.  In addition, when she is under stressful situations she has a chest tightness, there is no associated shortness of breath or PND orthopnea.  These palpitations and chest tightness are not activity related.    Past Medical History:   Asthma  Possibly perimenopausal  Depression/anxiety    Past history is negative for cancer, tuberculosis, diabetes mellitus, myocardial infarction,  rheumatic fever, hypertension, cerebrovascular accident, chronic kidney disease, peptic ulcer disease, chronic obstructive pulmonary disease, or thyroid disorder  and no lipid disorder.      Past Surgical History:   History reviewed. No pertinent surgical history.    Family History:   Family history positive for stenting in her mother's brother in his 40s or 50s.    Social History:   She lives at home independently with her , drinks rare alcohol, works in human resources, reports that she has never smoked. She has never used  "smokeless tobacco. She reports that she does not use drugs. The primary care physician is Zaki Queen MD    Meds:   Scheduled Meds:  Current Outpatient Medications   Medication Sig Dispense Refill     clomiPHENE (CLOMID) 50 mg tablet Take 50 mg by mouth daily. For the next 5d       DOCOSAHEXANOIC ACID ORAL Take 500 mg by mouth daily.       FLUoxetine (PROZAC) 20 MG capsule Take 20 mg by mouth daily. 20-30mg       Lactobacillus acidophilus 1 billion cell cap Take 1 tablet by mouth daily.       prenatal vit27,calcium-iron-FA 60 mg iron-1 mg Tab Take 1 tablet by mouth daily.       No current facility-administered medications for this visit.        PRN Meds:.    Allergies:   Patient has no known allergies.    Objective:      Physical Exam  130 lb 1.6 oz (59 kg)  5' 8.5\" (1.74 m)  Body mass index is 19.49 kg/m .  BP 96/60 (Patient Site: Left Arm, Patient Position: Sitting, Cuff Size: Adult Regular)   Pulse 80   Resp 16   Ht 5' 8.5\" (1.74 m)   Wt 130 lb 1.6 oz (59 kg)   BMI 19.49 kg/m      General Appearance:   Alert, cooperative and in no acute distress.   HEENT:  No scleral icterus; the mucous membranes were pink and moist.   Neck: JVP normal. No thyromegaly. No HJR   Chest: The spine was straight. The chest was symmetric.   Lungs:   Respirations unlabored; the lungs are clear to auscultation.   Cardiovascular:   S1 and S2 without murmur, clicks or rubs. Brachial, radial, carotid and posterior tibial pulses are intact and symetrical.  No carotid bruits noted   Abdomen:  No organomegaly, masses, bruits, or tenderness. Bowels sounds are present   Extremities: No cyanosis, clubbing, or edema.   Skin: No xanthelasma.   Neurologic: Mood and affect are appropriate.         Lab Reviewed Personally by myself  No results found for: NA, K, CL, CO2, BUN, CREATININE, GLUCOSE, CALCIUM  No results found for: WBC, HGB, HCT, MCV, PLT  No results found for: CHOL, TRIG, HDL, LDLDIRECT  No results found for: BNP    ECG " personally reviewed by myself shows not available     Review of Systems:     Review of Systems:   General: Night Sweats  Eyes: WNL  Ears/Nose/Throat: WNL  Lungs: Cough, Snoring  Heart: Irregular Heartbeat(palpitpations)  Stomach: Constipation  Bladder: WNL  Muscle/Joints: WNL  Skin: WNL  Nervous System: Dizziness  Mental Health: Anxiety     Blood: WNL

## 2021-05-29 NOTE — TELEPHONE ENCOUNTER
----- Message from Claritza Do MD sent at 5/24/2019  5:16 PM CDT -----  New consult from this afternoon and unable to schedule stress echo and a CT 21-day monitor, left order, can you make sure this gets done next week?  LF

## 2021-05-29 NOTE — TELEPHONE ENCOUNTER
----- Message from Jessica Cheung sent at 6/11/2019  8:20 AM CDT -----  Contact: Pt  Caller: Samantha    Primary cardiologist: Dr. Do    Detailed reason for call: Samantha states per her insurance that she needs clarification on stress test and she prefers a call back from Dr. Do.    Best phone number: [phone]    Best time to contact: Today if possible    Ok to leave a detailed message? No          Called patient to address concerns. She reports that she received a letter from [employer] indicating that more information in may be needed from Dr. Do regarding her stress test- echo stress exercise. She is concerned and wanted to speak with Dr. Do directly. She is also in the process of being qualified for Life Insurance. She is worried that if she does her stress test and event monitor now, that this could reflect badly in her application process for Life Insurance. She wanted to discuss and run this all by Dr. Do. Reassured patient that if the letter was from [email or web address] it was probably related to her stress test and needing a PA but unfortunately writer has not seen any letter come our way and notification from insurance verification team has not been received. Will inquire if Dr. Do has received any sort of letter. Will also email insurance verification team to see if letter has been received on their end.       Samantha Randhawa called in because she received notification from [facility] that her stress test may need a PA. Unfortunately, I have not seen any such letter come my way and have not received any email from Insurance verification. Has anything come in your mailbox? I will email insurance verification. Also, she is in the process of applying for Life insurance and worries that these tests will reflect negatively on this process. May she delay them out a month? Orders are for Echo Stress exercise and [place]- Hook up per 5/24/19 consult with you.  Thanks,  Mal

## 2021-05-29 NOTE — TELEPHONE ENCOUNTER
-- Message -----  From: Claritza Do MD  Sent: 6/11/2019   9:28 AM  To: Mamie Carlton RN    I have a low suspicion for arrhythmia or ischemia.  Given this, if she would rather apply for life insurance and how that application, through we could easily schedule the stress test and monitor months out provided she is stable.  LF      Called patient and relayed message. Also- per IV heartteam- the necessary medical documents have been submitted for further review to assess if patient needs a PA. WIll update IV heart team that patient has decided to cancel testing and reschedule when life insurance is completed. -[facility]

## 2021-06-02 ENCOUNTER — TELEPHONE (OUTPATIENT)
Dept: OBGYN | Facility: CLINIC | Age: 41
End: 2021-06-02

## 2021-06-02 VITALS — WEIGHT: 130.1 LBS | BODY MASS INDEX: 19.27 KG/M2 | HEIGHT: 69 IN

## 2021-06-02 NOTE — TELEPHONE ENCOUNTER
Emailing with patient about possible transfer to [facility] for delivery or post delivery if  requires transfer.     Working on arranging a phone visit.   Lianne Badillo MD

## 2021-06-03 ENCOUNTER — TELEPHONE (OUTPATIENT)
Dept: MATERNAL FETAL MEDICINE | Facility: CLINIC | Age: 41
End: 2021-06-03

## 2021-06-03 NOTE — TELEPHONE ENCOUNTER
--- Message -----  From: Claritza Do MD  Sent: 11/18/2019   1:13 PM CST  To: Mamie Carlton RN    85-fkx-amyg-old lady with recurrent palpitations with negative testing in past.  No work-up done.  Can we obtain the event monitor, 14-day ACT, and get a regular GXT for chest pain?  If these are abnormal then she will need to be seen again, otherwise we could hopefully have comforting results.        Called patient and she is agreeable to above testing. Orders placed and transferred to scheduling to have arranged. -[facility]

## 2021-06-03 NOTE — TELEPHONE ENCOUNTER
Phone call to Samantha to follow up on POC. Samantha has an initial phone visit with [facility] 6/4. Pt desires to deliver at [facility]. Pt will also have a visit with [facility] tomorrow. Pt will continue with US at [facility]. [facility] RN coordinator will follow up with Samantha on Monday 6/7.      Eunice Dover RN

## 2021-06-03 NOTE — TELEPHONE ENCOUNTER
----- Message from Hector Fowler sent at 11/18/2019 11:25 AM CST -----  Regarding: Concerns - question about past orders  General phone call:    Caller: Pt    Primary cardiologist: Dr Do    Detailed reason for call: Pt had some palpitations (intense) yesterday - over last month feeling some palpitations - Pt concerned    Pt had some testing ordered a few months ago - but Pt's insurance denied them - Pt asking what testing could/should be done --or should she come in for an apt?    Please call back when possible    New or active symptoms? New    Best phone number: [phone]    Best time to contact: any  Ok to leave a detailed message? yes  Device? No      Called back patient to address her concerns. She saw LBF in consult in May 2019 for palpitations. A 21 day event monitor and stress echocardiogram was ordered. Her insurance denied the stress echocardiogram stating that other testing could be done prior to this test. She wanted to hold on the monitor due to undergoing evaluation for additional life insurance policy. She noted that yesterday, she felt some rapid palpitations in her chest while watching TV that caused some associated chest tightness afterward. She states that she has noticed some chest palpitations over the past months. She does not drink caffeine. She denies shortness of breath. Will pass along to LBF for next steps.      Dr. Do,  Pt you saw in consult in May for palpitations. Stress echo denied by insurance. She waited on event monitor due to undergoing evaluation for life insurance policy. She has noticed some more palpitations lately that were particularly bothersome yesterday with some chest tightness. Recs? She would be interested in undergoing the event monitor and then additional testing afterwards based on results or if needed, see you again beforehand.  Thanks,  Mal     Additional Info:

## 2021-06-04 ENCOUNTER — VIRTUAL VISIT (OUTPATIENT)
Dept: OBGYN | Facility: CLINIC | Age: 41
End: 2021-06-04
Attending: OBSTETRICS & GYNECOLOGY
Payer: COMMERCIAL

## 2021-06-04 DIAGNOSIS — O09.93 HIGH-RISK PREGNANCY IN THIRD TRIMESTER: Primary | ICD-10-CM

## 2021-06-04 LAB — GROUP B STREP PCR: NORMAL

## 2021-06-04 PROCEDURE — 99203 OFFICE O/P NEW LOW 30 MIN: CPT | Mod: 95 | Performed by: OBSTETRICS & GYNECOLOGY

## 2021-06-04 NOTE — LETTER
2021       RE: Samantha Dudley  [address]  [facility] [address]     Dear Colleague,    Thank you for referring your patient, Samantha Dudley, to the [facility]OM[facility] [place] at [facility]. Please see a copy of my visit note below.    Telephone Note:    39 yo  at 36+2 with HARPREET 21 who is considering transfer to [facility] for delivery with pregnancy complicated by:    IVF (egg donor)  History of LEEP and one  section (reviewed with Dr. Julio César arambula for TOLAC if desires)  Tubal not an issue       Genetics note:   Samantha was referred to the [facility] clinic after the identification of fetal hemivertebrae on prenatal ultrasound at [facility] on 3/4/21. No other structural fetal anomalies were identified on ultrasound. Thus far the pregnancy has had a normal fetal echocardiogram and low-risk cell-free DNA analysis.    Ultrasound on 21:  Impression  =========    1) Lee intrauterine pregnancy at 34w 1d gestation.  2) Known vertebral fusion abnormality.  3) No other anomalies commonly detected by ultrasound were evident in the limited fetal anatomic survey as described above, anatomy limited by gestational age and fetal  lie.  4) Growth parameters and estimated fetal weight were consistent with established dates.  5) Mild polyhydramnios.    Recommendation  ==============    Thank-you for referring your patient to assess fetal growth.    I discussed the findings on today's ultrasound with Samantha. I reviewed that the amniotic fluid is marginally increased with 24 cm being the upper limit of normal. She raised the  prior concern of a tracheoesophageal (T-E) fistula as part of a VACTERL syndrome. I discussed the likelihood of a T-E fistula is very low but certainly not zero. She would  appreciate further clarity around delivery planning. At this point, I would support her initial delivery plan at [place] unless  there is sudden increase in amniotic fluid  volume or Neonatology disagrees.     assessment with orthopedics at [facility] is planned as is assessment with pediatric genetics at [facility] within 2 weeks after delivery. Expectation is that  by then, any other  findings will have been detected and x-ray of the infant spine will be available. A blood sample will be sent for microarray analysis at time of  delivery from the umbilical cord.    PNC reviewed   MFM notes reviewed    Current plans reviewed: await spontaneous labor in hopes of successful TOLAC.  Has an OB appt today (planning GBS) with BPP  Desires CNM care-- one a week for next 4 weeks and has BPPs scheduled already in [facility]. (Has a ).   PINEDA and MFM consult Guero    Also currently scheduled : (all need to be transferred to [facility]):   BPP   has Growth and BPP      Post Birth: needs cord blood genetic studies and xrays       Telemedicine Visit: The patient's condition can be safely assessed and treated in telemedicine encounter.      Reason for Telemedicine Visit: Patient has requested telehealth visit COVID 19    Originating Site (Patient Location): Patient's home    Distant Site (Provider Location): [facility] Clinics: [facility]    Consent:  The patient/guardian has verbally consented to: the potential risks and benefits of telemedicine versus in person care; bill my insurance or make self-payment for services provided; and responsibility for payment of non-covered services.     Mode of Communication:  Phone    As the provider I attest to compliance with applicable laws and regulations related to telemedicine.    Total visit time was 30 minutes with 20 minutes spent in counseling and coordination of care for pregnancy cares.      Lianne Badillo MD

## 2021-06-04 NOTE — PROGRESS NOTES
Telephone Note:    41 yo  at 36+2 with HARPREET 21 who is considering transfer to [facility] for delivery with pregnancy complicated by:    IVF (egg donor)  History of LEEP and one  section (reviewed with Dr. Julio César arambula for TOLAC if desires)  Tubal not an issue       Genetics note:   Samantha was referred to the [facility] clinic after the identification of fetal hemivertebrae on prenatal ultrasound at [facility] on 3/4/21. No other structural fetal anomalies were identified on ultrasound. Thus far the pregnancy has had a normal fetal echocardiogram and low-risk cell-free DNA analysis.    Ultrasound on 21:  Impression  =========    1) Lee intrauterine pregnancy at 34w 1d gestation.  2) Known vertebral fusion abnormality.  3) No other anomalies commonly detected by ultrasound were evident in the limited fetal anatomic survey as described above, anatomy limited by gestational age and fetal  lie.  4) Growth parameters and estimated fetal weight were consistent with established dates.  5) Mild polyhydramnios.    Recommendation  ==============    Thank-you for referring your patient to assess fetal growth.    I discussed the findings on today's ultrasound with Samantha. I reviewed that the amniotic fluid is marginally increased with 24 cm being the upper limit of normal. She raised the  prior concern of a tracheoesophageal (T-E) fistula as part of a VACTERL syndrome. I discussed the likelihood of a T-E fistula is very low but certainly not zero. She would  appreciate further clarity around delivery planning. At this point, I would support her initial delivery plan at [place] unless there is sudden increase in amniotic fluid  volume or Neonatology disagrees.     assessment with orthopedics at [facility] is planned as is assessment with pediatric genetics at [facility] within 2 weeks after delivery. Expectation is that  by then, any other  findings will have been  detected and x-ray of the infant spine will be available. A blood sample will be sent for microarray analysis at time of  delivery from the umbilical cord.    PNC reviewed   MFM notes reviewed    Current plans reviewed: await spontaneous labor in hopes of successful TOLAC.  Has an OB appt today (planning GBS) with BPP  Desires CNM care-- one a week for next 4 weeks and has BPPs scheduled already in [facility]. (Has a ).  Tuesday June 7 PINEDA and MFM consult Guero    Also currently scheduled : (all need to be transferred to [facility]):  June 9 BPP  June 16 has Growth and BPP  June 23 June 30    Post Birth: needs cord blood genetic studies and xrays       Telemedicine Visit: The patient's condition can be safely assessed and treated in telemedicine encounter.      Reason for Telemedicine Visit: Patient has requested telehealth visit COVID 19    Originating Site (Patient Location): Patient's home    Distant Site (Provider Location): [facility] Clinics: [facility]    Consent:  The patient/guardian has verbally consented to: the potential risks and benefits of telemedicine versus in person care; bill my insurance or make self-payment for services provided; and responsibility for payment of non-covered services.     Mode of Communication:  Phone    As the provider I attest to compliance with applicable laws and regulations related to telemedicine.    Total visit time was 30 minutes with 20 minutes spent in counseling and coordination of care for pregnancy cares.      Lianne Badillo MD

## 2021-06-07 DIAGNOSIS — O40.3XX0 POLYHYDRAMNIOS AFFECTING PREGNANCY IN THIRD TRIMESTER: ICD-10-CM

## 2021-06-07 DIAGNOSIS — O09.523 MULTIGRAVIDA OF ADVANCED MATERNAL AGE IN THIRD TRIMESTER: ICD-10-CM

## 2021-06-07 DIAGNOSIS — O35.9XX0 FETAL ABNORMALITY AFFECTING MANAGEMENT OF MOTHER, SINGLE OR UNSPECIFIED FETUS: Primary | ICD-10-CM

## 2021-06-07 NOTE — PROGRESS NOTES
Working to schedule pt for MARGARITO at [facility] this week and next, with BPP at [facility] next week. Pt to see Dr. Jack 6/7 at [facility]. Offered pt MARGARITO murcia/ Leticia Davis this afternoon, pt not available. Pt to call Psychiatric# back to discuss dates. Hold spots on 6/17 1500/1547 @ [facility] tentatively scheduled, to discuss with pt.

## 2021-06-08 ENCOUNTER — TRANSFERRED RECORDS (OUTPATIENT)
Dept: HEALTH INFORMATION MANAGEMENT | Facility: CLINIC | Age: 41
End: 2021-06-08

## 2021-06-08 ENCOUNTER — TELEPHONE (OUTPATIENT)
Dept: MATERNAL FETAL MEDICINE | Facility: CLINIC | Age: 41
End: 2021-06-08

## 2021-06-08 DIAGNOSIS — O35.9XX0 FETAL ABNORMALITY AFFECTING MANAGEMENT OF MOTHER, SINGLE OR UNSPECIFIED FETUS: Primary | ICD-10-CM

## 2021-06-08 NOTE — TELEPHONE ENCOUNTER
Phone call to Samantha to schedule appts at [place] with [facility] midwives and MFM.     Pt agreed to the following appts:    6/17  1000 Kaylah Giles CNM [facility]   1145 RL2/BPP MFM    6/23 1020 Midwife ([facility])   1100 BPP MFM    MG MFM RN notified of plan and appointments.    Eunice Dover RN

## 2021-06-08 NOTE — LETTER
June 8, 2021      Luiza Goldberg CNM,      Your patient, Samantha Dudley, 1980, has been following with Maternal Fetal Medicine due to pregnancy complicated by fetal vertebral fusion anomaly.  Samantha is planning delivery at [facility] due to fetal abnormality. Samantha will see our midwife group [facility] on 6/17 and 6/23 in conjunction with Maternal Fetal Medicine. Hospital and discharge summary will be sent to you following delivery. Patient will return to [place] for postpartum care.     Feel free to contact us with any questions/concerns.      Sincerely,    VARGHESE PURI RN    Patient Care Coordinator  Maternal-Fetal Medicine Center  Phone: [phone]  Fax: [phone]

## 2021-06-09 DIAGNOSIS — O09.93 HIGH-RISK PREGNANCY IN THIRD TRIMESTER: Primary | ICD-10-CM

## 2021-06-17 ENCOUNTER — TELEPHONE (OUTPATIENT)
Dept: MATERNAL FETAL MEDICINE | Facility: CLINIC | Age: 41
End: 2021-06-17

## 2021-06-17 ENCOUNTER — OFFICE VISIT (OUTPATIENT)
Dept: OBGYN | Facility: CLINIC | Age: 41
End: 2021-06-17
Attending: ADVANCED PRACTICE MIDWIFE
Payer: COMMERCIAL

## 2021-06-17 ENCOUNTER — TRANSFERRED RECORDS (OUTPATIENT)
Dept: HEALTH INFORMATION MANAGEMENT | Facility: CLINIC | Age: 41
End: 2021-06-17

## 2021-06-17 VITALS
BODY MASS INDEX: 22.62 KG/M2 | HEIGHT: 69 IN | SYSTOLIC BLOOD PRESSURE: 114 MMHG | WEIGHT: 152.7 LBS | HEART RATE: 72 BPM | DIASTOLIC BLOOD PRESSURE: 67 MMHG

## 2021-06-17 DIAGNOSIS — O35.9XX0 KNOWN FETAL ANOMALY, ANTEPARTUM, SINGLE OR UNSPECIFIED FETUS: ICD-10-CM

## 2021-06-17 DIAGNOSIS — Z98.891 S/P CESAREAN SECTION: ICD-10-CM

## 2021-06-17 DIAGNOSIS — Z78.9 CONCEIVED BY IN VITRO FERTILIZATION: ICD-10-CM

## 2021-06-17 DIAGNOSIS — Z91.89 AT RISK FOR INEFFECTIVE BREASTFEEDING: ICD-10-CM

## 2021-06-17 DIAGNOSIS — O09.529 HIGH-RISK PREGNANCY, ELDERLY MULTIGRAVIDA, UNSPECIFIED TRIMESTER: Primary | ICD-10-CM

## 2021-06-17 DIAGNOSIS — F41.9 ANXIETY: ICD-10-CM

## 2021-06-17 DIAGNOSIS — U07.1 COVID-19: ICD-10-CM

## 2021-06-17 PROBLEM — J45.909 ASTHMA: Status: ACTIVE | Noted: 2021-06-17

## 2021-06-17 PROBLEM — F32.A DEPRESSIVE DISORDER: Status: ACTIVE | Noted: 2020-12-17

## 2021-06-17 PROBLEM — F50.20 BULIMIA NERVOSA: Status: ACTIVE | Noted: 2020-12-17

## 2021-06-17 PROBLEM — J45.909 ASTHMA: Status: RESOLVED | Noted: 2021-06-17 | Resolved: 2021-06-17

## 2021-06-17 PROBLEM — Q76.49: Status: ACTIVE | Noted: 2021-06-17

## 2021-06-17 PROCEDURE — 99207 PR PRENATAL VISIT: CPT | Performed by: ADVANCED PRACTICE MIDWIFE

## 2021-06-17 PROCEDURE — G0463 HOSPITAL OUTPT CLINIC VISIT: HCPCS

## 2021-06-17 ASSESSMENT — PAIN SCALES - GENERAL: PAINLEVEL: NO PAIN (0)

## 2021-06-17 ASSESSMENT — MIFFLIN-ST. JEOR: SCORE: 1427.25

## 2021-06-17 NOTE — LETTER
"2021       RE: Samantha Dudley  [address]  [facility] [address]     Dear Colleague,    Thank you for referring your patient, Samantha Dudley, to the [facility] [place] at [facility]. Please see a copy of my visit note below.    Subjective:     40 year old  at 38w1d presents for routine prenatal visit.            no vaginal bleeding or leakage of fluid.  rare contractions.  pos fetal movement.        No HA, visual changes, RUQ or epigastric pain.   Patient concerns:   Feeling well overall.  Some records in care everywhere and some in paper form scanned.  Scheduled for short visit and needed time to discuss full transfer with complicated pregnancy details, prior CS and desire for unmediated labor and birth. Here alone. Frustrated by scheduling mistake and having to reschedule MFM visit today. Questions about our CNM service and routine care for TOLAC   Objective:  Vitals:    21 1007   BP: 114/67   Pulse: 72   Weight: 69.3 kg (152 lb 11.2 oz)   Height: 1.745 m (5' 8.7\")    See OB flowsheet  Assessment/Plan     Encounter Diagnoses   Name Primary?     High-risk pregnancy, elderly multigravida, unspecified trimester Yes     S/P  section      At risk for ineffective breastfeeding      Anxiety      COVID-19      Known fetal anomaly, antepartum, single or unspecified fetus      Conceived by in vitro fertilization      No orders of the defined types were placed in this encounter.    No orders of the defined types were placed in this encounter.      - Reviewed why/how to contact provider if headache/visual changes/RUQ or epigastric pain, decreased fetal movement, vaginal bleeding, leakage of fluid or strong/regular contractions.   Patient education/orders or handouts today:  Sign/symptoms of labor, When to call for labor or other concerns and discussed support with CNM team for TOLAC-IV, cont EFM, mobility, tub/shower.  Will " get complete records and  Transfer into epic, add details of fetal assessment, dx, plans for labor, plan for NICU at birth.  Reviewed our support of delayed cord clamping,  on her chest, early support for breastfeeding, IBCLC on the unit.  Return to clinic in 1 week and prn if questions or concerns.   Kaylah Hayward, APRN CNM

## 2021-06-17 NOTE — TELEPHONE ENCOUNTER
Phone call to Samantha regarding change in providers today for appt at [facility]. Pt would like to see Dr. Jack who is at [facility]. Pt will go to  for 130 US appt. RV [facility] appt cancelled. Pt aware, MD aware, [facility] clinics aware of change.       Eunice Dover RN

## 2021-06-17 NOTE — PROGRESS NOTES
"Subjective:     40 year old  at 38w1d presents for routine prenatal visit.            no vaginal bleeding or leakage of fluid.  rare contractions.  pos fetal movement.        No HA, visual changes, RUQ or epigastric pain.   Patient concerns:   Feeling well overall.  Some records in care everywhere and some in paper form scanned.  Scheduled for short visit and needed time to discuss full transfer with complicated pregnancy details, prior CS and desire for unmediated labor and birth. Here alone. Frustrated by scheduling mistake and having to reschedule M visit today. Questions about our CNM service and routine care for TOLAC   Objective:  Vitals:    21 1007   BP: 114/67   Pulse: 72   Weight: 69.3 kg (152 lb 11.2 oz)   Height: 1.745 m (5' 8.7\")    See OB flowsheet  Assessment/Plan     Encounter Diagnoses   Name Primary?     High-risk pregnancy, elderly multigravida, unspecified trimester Yes     S/P  section      At risk for ineffective breastfeeding      Anxiety      COVID-19      Known fetal anomaly, antepartum, single or unspecified fetus      Conceived by in vitro fertilization      No orders of the defined types were placed in this encounter.    No orders of the defined types were placed in this encounter.      - Reviewed why/how to contact provider if headache/visual changes/RUQ or epigastric pain, decreased fetal movement, vaginal bleeding, leakage of fluid or strong/regular contractions.   Patient education/orders or handouts today:  Sign/symptoms of labor, When to call for labor or other concerns and discussed support with CNM team for TOLAC-IV, cont EFM, mobility, tub/shower.  Will get complete records and  Transfer into epic, add details of fetal assessment, dx, plans for labor, plan for NICU at birth.  Reviewed our support of delayed cord clamping,  on her chest, early support for breastfeeding, IBCLC on the unit.  Return to clinic in 1 week and prn if questions or concerns. "   Kaylah Hayward, APRN CNM

## 2021-06-18 ENCOUNTER — DOCUMENTATION ONLY (OUTPATIENT)
Dept: MATERNAL FETAL MEDICINE | Facility: CLINIC | Age: 41
End: 2021-06-18

## 2021-06-18 NOTE — PROGRESS NOTES
CORD BLOOD ORDERS IN FETAL CHART Orders for array on cord blood placed in fetal chart per Dr. Jack. Consent to be obtained at next [facility] appointment on 6/23/21.      Violeta Kent MS, [facility]  Maternal Fetal Medicine  [facility]  Phone:[phone]

## 2021-06-18 NOTE — PROGRESS NOTES
Orders for array on cord blood in fetal chart (per Dr. Cotton). Patient to be consented for testing at next [facility] appt, 06/23/2021. Please draw 5 mL of cord blood in green top (no gel) tube AND 5mL of cord blood in purple top (EDTA) tube.     Violeta Kent MS, [facility]  Certified Genetic Counselor  Maternal Fetal Medicine  [facility], [place]  Phone:[phone]

## 2021-06-21 ENCOUNTER — TELEPHONE (OUTPATIENT)
Dept: MATERNAL FETAL MEDICINE | Facility: CLINIC | Age: 41
End: 2021-06-21

## 2021-06-21 DIAGNOSIS — O35.9XX0 FETAL ABNORMALITY AFFECTING MANAGEMENT OF MOTHER, SINGLE OR UNSPECIFIED FETUS: Primary | ICD-10-CM

## 2021-06-21 DIAGNOSIS — Z91.89 AT RISK FOR INEFFECTIVE BREASTFEEDING: ICD-10-CM

## 2021-06-21 DIAGNOSIS — O09.529 HIGH-RISK PREGNANCY, ELDERLY MULTIGRAVIDA, UNSPECIFIED TRIMESTER: ICD-10-CM

## 2021-06-21 PROBLEM — Z78.9 CONCEIVED BY IN VITRO FERTILIZATION: Status: ACTIVE | Noted: 2021-06-21

## 2021-06-21 NOTE — TELEPHONE ENCOUNTER
RN notified Samantha of  appt on 6/23 following her US appt. Pt aware of need to sign consent to collect genetic material at delivery.     Eunice Dover RN

## 2021-06-22 ENCOUNTER — TELEPHONE (OUTPATIENT)
Dept: OBGYN | Facility: CLINIC | Age: 41
End: 2021-06-22

## 2021-06-22 NOTE — PROGRESS NOTES
[facility]  Genetic Counseling Consult    Patient:  Samantha Dudley YOB: 1980   Date of Service:  21      Samantha Dudley was seen at the [facility] for genetic consultation for the indication of fetal hemivertebrae. She met with genetic counseling to discuss and consent for  cordblood genetic testing due to the ultrasound finding of fetal hemivertebrae and small stomach (possible TE fistula).        Impression/Plan:   Samantha had a genetic counseling session to discuss and consent for  genetic testing. Samantha expressed understanding of the genetic testing that will be ordered after delivery. She asked thoughtful questions, and I gave her my contact information for her or her partner to call if they had questions in the future.     Consent for  cordblood genetic testing was obtained for Comparative Genomic Hybridization with SNP Array with Limited G-bands during today's visit. Additionally, Samantha consented for cordblood DNA to be banked with the [facility] to be used for single gene testing ordered by Dr. Sergey Garrido. Orders have been placed in the fetal chart. The completed consent form will be scanned under the media tab in Samantha's chart. Additionally, instructions for ordering and completing testing has been placed in her TC care plan along with my contact information.      Instructions for testing is as follows:  Testing to be performed on cord blood after delivery:     [id number] CGH with SNP array with limited G-bands. 5mL Green Sodium Heparin AND 5 mL Purple EDTA.    [id number] Next Generation Sequencin-10mLs cordblood in yellow ACD (solution A) tube OR purple EDTA tube. Yellow top tube preferred    Pregnancy History:   /Parity:    Age at Delivery: 40 year old  HARPREET: 2021, by Embryo Transfer  Gestational Age: 38w6d    No significant complications or  exposures were reported in the current pregnancy.  This pregnancy was conceived via IVF via frozen embryo transfer of one embryo on 10/11/20. A 27 year old egg donor was used. Prieimplantation genetic aneuploidy screening was not performed on the embryo.    Samantha guevara pregnancy history is significant for:  o 39w0d  male 10[birth year]14[birth year]2016    Testing Options:   We discussed the following options:     Limited G-band analysis determines the amount and arrangement of an individual's chromosomes. This testing can identify if there are extra or missing chromosomes. This analysis can confirm or rule out Trisomy 13, Trisomy 18, and Trisomy 21 (Down Syndrome).     Array CGH analysis looks for small extra or missing pieces of DNA.  Chromosomal deletions and duplications may cause problems with an individual's health and development including learning disabilities, developmental delays, physical differences, and psychiatric challenges.  The specific symptoms would depend on the specific difference in the DNA and what genes are involved.      We reviewed the benefits, limitations, and possible results from CGH / SNP analysis which can include:    Negative: No extra or missing pieces of DNA were seen.     Positive: A deletion or duplication in the DNA was seen that is known to be associated with a particular set of symptoms or known syndrome.     Variant of uncertain significance (VUS): A deletion or duplication in the DNA was seen, but it is not known if it explains the symptoms.      We reviewed the benefits and limitations of this testing.  Screening tests provide a risk assessment specific to the pregnancy for certain fetal chromosome abnormalities, but cannot definitively diagnose or exclude a fetal chromosome abnormality.  Follow-up genetic counseling and consideration of diagnostic testing is recommended with any abnormal screening result.     Diagnostic tests carry inherent risks- including risk of miscarriage- that require  careful consideration.  These tests can detect fetal chromosome abnormalities with greater than 99% certainty.  Results can be compromised by maternal cell contamination or mosaicism, and are limited by the resolution of cytogenetic G-banding technology.  There is no screening nor diagnostic test that can detect all forms of birth defects or mental disability.     It was a pleasure to be involved with Samantha guevara care. Face-to-face time of the meeting was 20 minutes.      Jennifer Anaya MS, [facility]  Genetic Counselor  Maternal Fetal Medicine  [facility]   Phone: [phone]  Pager: [phone]  Email: [email or web address]

## 2021-06-23 ENCOUNTER — OFFICE VISIT (OUTPATIENT)
Dept: MATERNAL FETAL MEDICINE | Facility: CLINIC | Age: 41
End: 2021-06-23
Attending: OBSTETRICS & GYNECOLOGY
Payer: COMMERCIAL

## 2021-06-23 ENCOUNTER — OFFICE VISIT (OUTPATIENT)
Dept: OBGYN | Facility: CLINIC | Age: 41
End: 2021-06-23
Attending: ADVANCED PRACTICE MIDWIFE
Payer: COMMERCIAL

## 2021-06-23 ENCOUNTER — HOSPITAL ENCOUNTER (OUTPATIENT)
Dept: ULTRASOUND IMAGING | Facility: CLINIC | Age: 41
End: 2021-06-23
Attending: OBSTETRICS & GYNECOLOGY
Payer: COMMERCIAL

## 2021-06-23 VITALS
HEART RATE: 82 BPM | BODY MASS INDEX: 22.94 KG/M2 | SYSTOLIC BLOOD PRESSURE: 92 MMHG | WEIGHT: 154 LBS | DIASTOLIC BLOOD PRESSURE: 63 MMHG

## 2021-06-23 DIAGNOSIS — Z78.9 CONCEIVED BY IN VITRO FERTILIZATION: ICD-10-CM

## 2021-06-23 DIAGNOSIS — O35.9XX0 KNOWN FETAL ANOMALY, ANTEPARTUM, SINGLE OR UNSPECIFIED FETUS: ICD-10-CM

## 2021-06-23 DIAGNOSIS — O40.3XX0 POLYHYDRAMNIOS AFFECTING PREGNANCY IN THIRD TRIMESTER: ICD-10-CM

## 2021-06-23 DIAGNOSIS — Z98.891 S/P CESAREAN SECTION: ICD-10-CM

## 2021-06-23 DIAGNOSIS — O09.529 HIGH-RISK PREGNANCY, ELDERLY MULTIGRAVIDA, UNSPECIFIED TRIMESTER: ICD-10-CM

## 2021-06-23 DIAGNOSIS — O09.523 MULTIGRAVIDA OF ADVANCED MATERNAL AGE IN THIRD TRIMESTER: ICD-10-CM

## 2021-06-23 DIAGNOSIS — J45.20 MILD INTERMITTENT ASTHMA, UNSPECIFIED WHETHER COMPLICATED: ICD-10-CM

## 2021-06-23 DIAGNOSIS — O35.9XX0 FETAL ABNORMALITY AFFECTING MANAGEMENT OF MOTHER, SINGLE OR UNSPECIFIED FETUS: Primary | ICD-10-CM

## 2021-06-23 DIAGNOSIS — O35.9XX0 FETAL ABNORMALITY AFFECTING MANAGEMENT OF MOTHER, SINGLE OR UNSPECIFIED FETUS: ICD-10-CM

## 2021-06-23 DIAGNOSIS — F41.9 ANXIETY: ICD-10-CM

## 2021-06-23 DIAGNOSIS — Z91.89 AT RISK FOR INEFFECTIVE BREASTFEEDING: ICD-10-CM

## 2021-06-23 DIAGNOSIS — O09.529 HIGH-RISK PREGNANCY, ELDERLY MULTIGRAVIDA, UNSPECIFIED TRIMESTER: Primary | ICD-10-CM

## 2021-06-23 PROCEDURE — 76819 FETAL BIOPHYS PROFIL W/O NST: CPT

## 2021-06-23 PROCEDURE — 76819 FETAL BIOPHYS PROFIL W/O NST: CPT | Mod: 26 | Performed by: OBSTETRICS & GYNECOLOGY

## 2021-06-23 PROCEDURE — 99207 PR PRENATAL VISIT: CPT | Performed by: ADVANCED PRACTICE MIDWIFE

## 2021-06-23 PROCEDURE — G0463 HOSPITAL OUTPT CLINIC VISIT: HCPCS

## 2021-06-23 PROCEDURE — 96040 HC GENETIC COUNSELING, EACH 30 MINUTES: CPT | Performed by: GENETIC COUNSELOR, MS

## 2021-06-23 RX ORDER — MULTIVITAMIN WITH IRON
1 TABLET ORAL DAILY
COMMUNITY

## 2021-06-23 RX ORDER — DOCUSATE SODIUM 100 MG/1
100 CAPSULE, LIQUID FILLED ORAL 2 TIMES DAILY
Status: ON HOLD | COMMUNITY
End: 2021-07-04

## 2021-06-23 RX ORDER — ASPIRIN 325 MG
TABLET ORAL DAILY
Status: ON HOLD | COMMUNITY
End: 2021-07-04

## 2021-06-23 RX ORDER — PRENATAL VIT/IRON FUM/FOLIC AC 27MG-0.8MG
1 TABLET ORAL DAILY
COMMUNITY
End: 2021-07-28

## 2021-06-23 RX ORDER — ALBUTEROL SULFATE 90 UG/1
AEROSOL, METERED RESPIRATORY (INHALATION)
COMMUNITY

## 2021-06-23 NOTE — PROGRESS NOTES
"Please see \"Imaging\" tab under \"Chart Review\" for details of today's US at the [facility].    Yasir Correa MD  Maternal-Fetal Medicine    "

## 2021-06-23 NOTE — PROGRESS NOTES
Subjective:     40 year old  at 39w0d presents for routine prenatal visit.            no vaginal bleeding or leakage of fluid.  some contractions.  pos fetal movement.        No HA, visual changes, RUQ or epigastric pain.   Patient concerns:   Feeling well overall. Frustrated that [facility] OR records have been so hard to get, wants to have clear understanding about the meds used in first CS as they left her feeling terrible.Asking for understanding about how the group can have clarity re: unique needs of her pregnancy and birth plan, and communication with NICU inpatient.  Objective:  Vitals:    21 1030   BP: 92/63   Pulse: 82   Weight: 69.9 kg (154 lb)    See OB flowsheet  Assessment/Plan     Encounter Diagnoses   Name Primary?     High-risk pregnancy, elderly multigravida, unspecified trimester Yes     S/P  section      Anxiety      At risk for ineffective breastfeeding      Known fetal anomaly, antepartum, single or unspecified fetus      Conceived by in vitro fertilization      Mild intermittent asthma, unspecified whether complicated      No orders of the defined types were placed in this encounter.    Orders Placed This Encounter   Medications     DISCONTD: diphenhydrAMINE (BENADRYL) 2 mg/mL SOLN     Sig: Take 1 tablet by mouth     magnesium 250 MG tablet     Sig: Take 1 tablet by mouth daily     aspirin 162.5 mg tablet     Sig: Take by mouth daily     docusate sodium (STOOL SOFTENER) 100 MG capsule     Sig: Take 100 mg by mouth 2 times daily     doxylamine (UNISOM) 25 MG TABS tablet     Sig: Take 25 mg by mouth At Bedtime     Prenatal Vit-Fe Fumarate-FA (PRENATAL MULTIVITAMIN W/IRON) 27-0.8 MG tablet     Sig: Take 1 tablet by mouth daily     albuterol (PROAIR HFA/PROVENTIL HFA/VENTOLIN HFA) 108 (90 Base) MCG/ACT inhaler     Sig: albuterol sulfate HFA 90 mcg/actuation aerosol inhaler     Pharmacy may dispense brand covered by insurance (Proair, or proventil or ventolin or generic albuterol  inhaler)     mometasone (ASMANEX TWISTHALER) 220 MCG/INH inhaler     Sig: Inhale into the lungs every evening       - Reviewed why/how to contact provider if headache/visual changes/RUQ or epigastric pain, decreased fetal movement, vaginal bleeding, leakage of fluid or strong/regular contractions.   Patient education/orders or handouts today:  Sign/symptoms of labor and When to call for labor or other concerns   Discussed problem list and details of info for birth, fetal chart started, plans for communicate with NICU  Reviewed care and routine for TOLAC  Return to clinic in 1 week and prn if questions or concerns.   Kaylah Hayward, APRN [facility]

## 2021-06-23 NOTE — LETTER
2021       RE: Samantha Dudley  [address]  [facility] [address]     Dear Colleague,    Thank you for referring your patient, Samantha Dudley, to the [facility] [place] [facility]. Please see a copy of my visit note below.    Subjective:     40 year old  at 39w0d presents for routine prenatal visit.            no vaginal bleeding or leakage of fluid.  some contractions.  pos fetal movement.        No HA, visual changes, RUQ or epigastric pain.   Patient concerns:   Feeling well overall. Frustrated that [facility] OR records have been so hard to get, wants to have clear understanding about the meds used in first CS as they left her feeling terrible.Asking for understanding about how the group can have clarity re: unique needs of her pregnancy and birth plan, and communication with NICU inpatient.  Objective:  Vitals:    21 1030   BP: 92/63   Pulse: 82   Weight: 69.9 kg (154 lb)    See OB flowsheet  Assessment/Plan     Encounter Diagnoses   Name Primary?     High-risk pregnancy, elderly multigravida, unspecified trimester Yes     S/P  section      Anxiety      At risk for ineffective breastfeeding      Known fetal anomaly, antepartum, single or unspecified fetus      Conceived by in vitro fertilization      Mild intermittent asthma, unspecified whether complicated      No orders of the defined types were placed in this encounter.    Orders Placed This Encounter   Medications     DISCONTD: diphenhydrAMINE (BENADRYL) 2 mg/mL SOLN     Sig: Take 1 tablet by mouth     magnesium 250 MG tablet     Sig: Take 1 tablet by mouth daily     aspirin 162.5 mg tablet     Sig: Take by mouth daily     docusate sodium (STOOL SOFTENER) 100 MG capsule     Sig: Take 100 mg by mouth 2 times daily     doxylamine (UNISOM) 25 MG TABS tablet     Sig: Take 25 mg by mouth At Bedtime     Prenatal Vit-Fe Fumarate-FA (PRENATAL MULTIVITAMIN W/IRON)  27-0.8 MG tablet     Sig: Take 1 tablet by mouth daily     albuterol (PROAIR HFA/PROVENTIL HFA/VENTOLIN HFA) 108 (90 Base) MCG/ACT inhaler     Sig: albuterol sulfate HFA 90 mcg/actuation aerosol inhaler     Pharmacy may dispense brand covered by insurance (Proair, or proventil or ventolin or generic albuterol inhaler)     mometasone (ASMANEX TWISTHALER) 220 MCG/INH inhaler     Sig: Inhale into the lungs every evening       - Reviewed why/how to contact provider if headache/visual changes/RUQ or epigastric pain, decreased fetal movement, vaginal bleeding, leakage of fluid or strong/regular contractions.   Patient education/orders or handouts today:  Sign/symptoms of labor and When to call for labor or other concerns   Discussed problem list and details of info for birth, fetal chart started, plans for communicate with NICU  Reviewed care and routine for TOLAC  Return to clinic in 1 week and prn if questions or concerns.   ANDREY Mckeon CNM

## 2021-06-24 ENCOUNTER — MYC MEDICAL ADVICE (OUTPATIENT)
Dept: OBGYN | Facility: CLINIC | Age: 41
End: 2021-06-24

## 2021-06-24 ENCOUNTER — TELEPHONE (OUTPATIENT)
Dept: MATERNAL FETAL MEDICINE | Facility: CLINIC | Age: 41
End: 2021-06-24

## 2021-06-24 DIAGNOSIS — O09.529 HIGH-RISK PREGNANCY, ELDERLY MULTIGRAVIDA, UNSPECIFIED TRIMESTER: ICD-10-CM

## 2021-06-24 DIAGNOSIS — Z78.9 CONCEIVED BY IN VITRO FERTILIZATION: ICD-10-CM

## 2021-06-24 DIAGNOSIS — Z91.89 AT RISK FOR INEFFECTIVE BREASTFEEDING: ICD-10-CM

## 2021-06-24 SDOH — ECONOMIC STABILITY: INCOME INSECURITY: HOW HARD IS IT FOR YOU TO PAY FOR THE VERY BASICS LIKE FOOD, HOUSING, MEDICAL CARE, AND HEATING?: NOT ASKED

## 2021-06-24 SDOH — ECONOMIC STABILITY: FOOD INSECURITY: WITHIN THE PAST 12 MONTHS, YOU WORRIED THAT YOUR FOOD WOULD RUN OUT BEFORE YOU GOT MONEY TO BUY MORE.: NOT ASKED

## 2021-06-24 SDOH — ECONOMIC STABILITY: TRANSPORTATION INSECURITY
IN THE PAST 12 MONTHS, HAS LACK OF TRANSPORTATION KEPT YOU FROM MEETINGS, WORK, OR FROM GETTING THINGS NEEDED FOR DAILY LIVING?: NOT ASKED

## 2021-06-24 SDOH — ECONOMIC STABILITY: FOOD INSECURITY: WITHIN THE PAST 12 MONTHS, THE FOOD YOU BOUGHT JUST DIDN'T LAST AND YOU DIDN'T HAVE MONEY TO GET MORE.: NOT ASKED

## 2021-06-24 SDOH — ECONOMIC STABILITY: TRANSPORTATION INSECURITY
IN THE PAST 12 MONTHS, HAS THE LACK OF TRANSPORTATION KEPT YOU FROM MEDICAL APPOINTMENTS OR FROM GETTING MEDICATIONS?: NOT ASKED

## 2021-06-24 NOTE — TELEPHONE ENCOUNTER
Phone call to Samantha regarding scheduling BPP for next week, 6/30. Offered patient MFM appt at 1145 following [facility] appt or 0845 MFM appt at [place]. Pt desires to see Dr. Jack. Will go to [facility] on 6/30 at 0845.  MFM notified.      Eunice Dover RN

## 2021-06-26 NOTE — PROGRESS NOTES
MARGARITO Visit 2021    S: Patient had MFM visit today and they have recommended delivery today given her age and fetal anomalies.  Patient feels ok with this plan and is now just nervous about moving forward with induction.  Having contractions, but not regular or painful.  No VB or LOF.  + FM.  Denies HA, vision changes, SOB, RUQ pain or increased swelling in her extremities.    O:  See OB Flowsheet    A/P: 41 yo  @ 40w0d presents for MARGARITO visit  1) PNC: Rh positive, Renee negative, RI, Infectious labs wnl,   2) AMA/IVF pregnancy/Genetic screening: Use of donor egg.  Low risk NIPT, AFP neg, Level II US with suboptimal views of heart and spine, had fetal echo that was normal, had subsequent scan with evidence of fetal spinal fusion anomaly of hemivertebrae  3) Fetal spinal fusion anomaly: Hemivertebrae and possible relation to VACTERL possibility discussed as has had issues with size of stomach at some US.  Plans to have NICU at delivery and for initial evaluation on maternal abdomen.  Plan imaging for baby prior to discharge.  Cord blood orders in fetal chart already placed.  Has plan for visit with [facility] after delivery.  4) History of  section: Came in at 7-8 cm, had Epidural, reportedly labor stalled but then did get to complete dilation at which time face presentation diagnosed and then moved forward with  section.  Had issues with incision and healing.  Desires TOLAC and plans for unmedicated labor.  Plans delivery with CNM team.  Did go over patient information TOLAC form with her today.  5) Asthma: Mometasone and albuterol prn, no hemabate in labor  6) Palpitations: Seen by Cardiology in pregnancy, had Holter monitor without concerning findings  7) Depression/Anxiety: On fluoxetine, monitor mood postpartum  8) History of LEEP in   9) History of bulimia in remote past  10) COVID in pregnancy: Diagnosed 2020 and symptomatic at that time  11) GBS  negative  12) MFM today recommended delivery.  Charge RN and CNM Lidia aware of plan, patient to come at 2 PM to initiate induction.    Eunice Corrales MD

## 2021-06-28 ENCOUNTER — TELEPHONE (OUTPATIENT)
Dept: OBGYN | Facility: CLINIC | Age: 41
End: 2021-06-28

## 2021-06-28 NOTE — TELEPHONE ENCOUNTER
Health Call Center    Phone Message    May a detailed message be left on voicemail: yes     Reason for Call: Other: pt has an appt with  on 6/30- does she need a BPP? she is having one done in [place], except she doesnt want to overlap her care, also she wants to know who her primary OBGYN is, because she saw Kaylah gomez, but now is scheduled with , is she in the midwife care? or MD care, Please call Samantha, thank you     Action Taken: Message routed to:  [facility] ([facility]): THERESA RN    Travel Screening: Not Applicable

## 2021-06-28 NOTE — TELEPHONE ENCOUNTER
Called patient and answered questions regarding who to call when she is in labor, how to contact the clinic during and after business hours. Verified upcoming appointment and location.  Pt verbalized understanding and agreement, denied further questions/concerns.

## 2021-06-29 ENCOUNTER — TELEPHONE (OUTPATIENT)
Dept: OBGYN | Facility: CLINIC | Age: 41
End: 2021-06-29

## 2021-06-29 NOTE — TELEPHONE ENCOUNTER
Pt called with c/o back pain, cramping, denies LoF and affirms baby moving.  Pt questioning if labor or not.  Pt denies contractions now but did have them last night.  This writer sent a My Chart education form for recognizing labor and advised pt of the phone number for after hours.  Advised pt that this could be the sign of early labor.  Discussed counting of contractions, taking a bath, signs that she would need to go to the birthplace or to call.  Pt verbalized understanding.

## 2021-06-30 ENCOUNTER — HOSPITAL ENCOUNTER (INPATIENT)
Facility: CLINIC | Age: 41
LOS: 4 days | Discharge: HOME | End: 2021-07-04
Attending: ADVANCED PRACTICE MIDWIFE | Admitting: ADVANCED PRACTICE MIDWIFE
Payer: COMMERCIAL

## 2021-06-30 ENCOUNTER — OFFICE VISIT (OUTPATIENT)
Dept: OBGYN | Facility: CLINIC | Age: 41
End: 2021-06-30
Attending: OBSTETRICS & GYNECOLOGY
Payer: COMMERCIAL

## 2021-06-30 VITALS
HEART RATE: 106 BPM | SYSTOLIC BLOOD PRESSURE: 103 MMHG | WEIGHT: 152.7 LBS | DIASTOLIC BLOOD PRESSURE: 70 MMHG | BODY MASS INDEX: 22.75 KG/M2

## 2021-06-30 DIAGNOSIS — O35.9XX0 KNOWN FETAL ANOMALY, ANTEPARTUM, SINGLE OR UNSPECIFIED FETUS: ICD-10-CM

## 2021-06-30 DIAGNOSIS — Z98.891 S/P CESAREAN SECTION: Primary | ICD-10-CM

## 2021-06-30 DIAGNOSIS — Z98.891 S/P CESAREAN SECTION: ICD-10-CM

## 2021-06-30 DIAGNOSIS — O09.529 HIGH-RISK PREGNANCY, ELDERLY MULTIGRAVIDA, UNSPECIFIED TRIMESTER: Primary | ICD-10-CM

## 2021-06-30 LAB
ABO + RH BLD: NORMAL
ABO + RH BLD: NORMAL
BASOPHILS # BLD AUTO: 0 10E9/L (ref 0–0.2)
BASOPHILS NFR BLD AUTO: 0.2 %
BLD GP AB SCN SERPL QL: NORMAL
BLOOD BANK CMNT PATIENT-IMP: NORMAL
DIFFERENTIAL METHOD BLD: ABNORMAL
EOSINOPHIL # BLD AUTO: 0 10E9/L (ref 0–0.7)
EOSINOPHIL NFR BLD AUTO: 0.3 %
ERYTHROCYTE [DISTWIDTH] IN BLOOD BY AUTOMATED COUNT: 13.1 % (ref 10–15)
HCT VFR BLD AUTO: 35.8 % (ref 35–47)
HGB BLD-MCNC: 12.2 G/DL (ref 11.7–15.7)
IMM GRANULOCYTES # BLD: 0 10E9/L (ref 0–0.4)
IMM GRANULOCYTES NFR BLD: 0.4 %
LABORATORY COMMENT REPORT: NORMAL
LYMPHOCYTES # BLD AUTO: 1.5 10E9/L (ref 0.8–5.3)
LYMPHOCYTES NFR BLD AUTO: 15.8 %
MCH RBC QN AUTO: 35.6 PG (ref 26.5–33)
MCHC RBC AUTO-ENTMCNC: 34.1 G/DL (ref 31.5–36.5)
MCV RBC AUTO: 104 FL (ref 78–100)
MONOCYTES # BLD AUTO: 0.5 10E9/L (ref 0–1.3)
MONOCYTES NFR BLD AUTO: 5.4 %
NEUTROPHILS # BLD AUTO: 7.2 10E9/L (ref 1.6–8.3)
NEUTROPHILS NFR BLD AUTO: 77.9 %
NRBC # BLD AUTO: 0 10*3/UL
NRBC BLD AUTO-RTO: 0 /100
PLATELET # BLD AUTO: 182 10E9/L (ref 150–450)
RBC # BLD AUTO: 3.43 10E12/L (ref 3.8–5.2)
SARS-COV-2 RNA RESP QL NAA+PROBE: NEGATIVE
SPECIMEN EXP DATE BLD: NORMAL
SPECIMEN SOURCE: NORMAL
WBC # BLD AUTO: 9.3 10E9/L (ref 4–11)

## 2021-06-30 PROCEDURE — 120N000002 HC R&B MED SURG/OB UMMC

## 2021-06-30 PROCEDURE — 85025 COMPLETE CBC W/AUTO DIFF WBC: CPT | Performed by: ADVANCED PRACTICE MIDWIFE

## 2021-06-30 PROCEDURE — 86900 BLOOD TYPING SEROLOGIC ABO: CPT | Performed by: ADVANCED PRACTICE MIDWIFE

## 2021-06-30 PROCEDURE — 86850 RBC ANTIBODY SCREEN: CPT | Performed by: ADVANCED PRACTICE MIDWIFE

## 2021-06-30 PROCEDURE — 99231 SBSQ HOSP IP/OBS SF/LOW 25: CPT | Performed by: CLINICAL NURSE SPECIALIST

## 2021-06-30 PROCEDURE — 87635 SARS-COV-2 COVID-19 AMP PRB: CPT | Performed by: ADVANCED PRACTICE MIDWIFE

## 2021-06-30 PROCEDURE — 36415 COLL VENOUS BLD VENIPUNCTURE: CPT | Performed by: ADVANCED PRACTICE MIDWIFE

## 2021-06-30 PROCEDURE — 99207 PR PRENATAL VISIT: CPT | Performed by: OBSTETRICS & GYNECOLOGY

## 2021-06-30 PROCEDURE — 86780 TREPONEMA PALLIDUM: CPT | Performed by: ADVANCED PRACTICE MIDWIFE

## 2021-06-30 PROCEDURE — 86901 BLOOD TYPING SEROLOGIC RH(D): CPT | Performed by: ADVANCED PRACTICE MIDWIFE

## 2021-06-30 PROCEDURE — G0463 HOSPITAL OUTPT CLINIC VISIT: HCPCS

## 2021-06-30 RX ORDER — ACETAMINOPHEN 325 MG/1
650 TABLET ORAL EVERY 4 HOURS PRN
Status: DISCONTINUED | OUTPATIENT
Start: 2021-06-30 | End: 2021-07-02

## 2021-06-30 RX ORDER — CARBOPROST TROMETHAMINE 250 UG/ML
250 INJECTION, SOLUTION INTRAMUSCULAR
Status: DISCONTINUED | OUTPATIENT
Start: 2021-06-30 | End: 2021-07-02

## 2021-06-30 RX ORDER — SODIUM CHLORIDE, SODIUM LACTATE, POTASSIUM CHLORIDE, CALCIUM CHLORIDE 600; 310; 30; 20 MG/100ML; MG/100ML; MG/100ML; MG/100ML
INJECTION, SOLUTION INTRAVENOUS CONTINUOUS
Status: DISCONTINUED | OUTPATIENT
Start: 2021-06-30 | End: 2021-07-02

## 2021-06-30 RX ORDER — NALOXONE HYDROCHLORIDE 0.4 MG/ML
0.2 INJECTION, SOLUTION INTRAMUSCULAR; INTRAVENOUS; SUBCUTANEOUS
Status: DISCONTINUED | OUTPATIENT
Start: 2021-06-30 | End: 2021-07-02

## 2021-06-30 RX ORDER — LIDOCAINE 40 MG/G
CREAM TOPICAL
Status: DISCONTINUED | OUTPATIENT
Start: 2021-06-30 | End: 2021-07-02

## 2021-06-30 RX ORDER — ONDANSETRON 2 MG/ML
4 INJECTION INTRAMUSCULAR; INTRAVENOUS EVERY 6 HOURS PRN
Status: DISCONTINUED | OUTPATIENT
Start: 2021-06-30 | End: 2021-07-02

## 2021-06-30 RX ORDER — TRANEXAMIC ACID 10 MG/ML
1 INJECTION, SOLUTION INTRAVENOUS EVERY 30 MIN PRN
Status: DISCONTINUED | OUTPATIENT
Start: 2021-06-30 | End: 2021-07-02

## 2021-06-30 RX ORDER — OXYCODONE AND ACETAMINOPHEN 5; 325 MG/1; MG/1
1 TABLET ORAL
Status: DISCONTINUED | OUTPATIENT
Start: 2021-06-30 | End: 2021-07-02

## 2021-06-30 RX ORDER — NALOXONE HYDROCHLORIDE 0.4 MG/ML
0.4 INJECTION, SOLUTION INTRAMUSCULAR; INTRAVENOUS; SUBCUTANEOUS
Status: DISCONTINUED | OUTPATIENT
Start: 2021-06-30 | End: 2021-07-02

## 2021-06-30 RX ORDER — FENTANYL CITRATE 50 UG/ML
50-100 INJECTION, SOLUTION INTRAMUSCULAR; INTRAVENOUS
Status: DISCONTINUED | OUTPATIENT
Start: 2021-06-30 | End: 2021-07-02

## 2021-06-30 RX ORDER — METHYLERGONOVINE MALEATE 0.2 MG/ML
200 INJECTION INTRAVENOUS
Status: DISCONTINUED | OUTPATIENT
Start: 2021-06-30 | End: 2021-07-02

## 2021-06-30 RX ORDER — OXYTOCIN/0.9 % SODIUM CHLORIDE 30/500 ML
100-340 PLASTIC BAG, INJECTION (ML) INTRAVENOUS CONTINUOUS PRN
Status: COMPLETED | OUTPATIENT
Start: 2021-06-30 | End: 2021-07-01

## 2021-06-30 RX ORDER — OXYTOCIN 10 [USP'U]/ML
10 INJECTION, SOLUTION INTRAMUSCULAR; INTRAVENOUS
Status: DISCONTINUED | OUTPATIENT
Start: 2021-06-30 | End: 2021-07-02

## 2021-06-30 RX ORDER — IBUPROFEN 800 MG/1
800 TABLET, FILM COATED ORAL
Status: DISCONTINUED | OUTPATIENT
Start: 2021-06-30 | End: 2021-07-02

## 2021-06-30 ASSESSMENT — ACTIVITIES OF DAILY LIVING (ADL)
FALL_HISTORY_WITHIN_LAST_SIX_MONTHS: NO
TOILETING_ISSUES: NO

## 2021-06-30 NOTE — LETTER
2021       RE: Samantha Dudley  [address]  [facility] [address]     Dear Colleague,    Thank you for referring your patient, Samantha Dudley, to the [facility] [place] at [facility]. Please see a copy of my visit note below.    MARGARITO Visit 2021    S: Patient had MFM visit today and they have recommended delivery today given her age and fetal anomalies.  Patient feels ok with this plan and is now just nervous about moving forward with induction.  Having contractions, but not regular or painful.  No VB or LOF.  + FM.  Denies HA, vision changes, SOB, RUQ pain or increased swelling in her extremities.    O:  See OB Flowsheet    A/P: 41 yo  @ 40w0d presents for MARGARITO visit  1) PNC: Rh positive, Renee negative, RI, Infectious labs wnl,   2) AMA/IVF pregnancy/Genetic screening: Use of donor egg.  Low risk NIPT, AFP neg, Level II US with suboptimal views of heart and spine, had fetal echo that was normal, had subsequent scan with evidence of fetal spinal fusion anomaly of hemivertebrae  3) Fetal spinal fusion anomaly: Hemivertebrae and possible relation to VACTERL possibility discussed as has had issues with size of stomach at some US.  Plans to have NICU at delivery and for initial evaluation on maternal abdomen.  Plan imaging for baby prior to discharge.  Cord blood orders in fetal chart already placed.  Has plan for visit with [facility] after delivery.  4) History of  section: Came in at 7-8 cm, had Epidural, reportedly labor stalled but then did get to complete dilation at which time face presentation diagnosed and then moved forward with  section.  Had issues with incision and healing.  Desires TOLAC and plans for unmedicated labor.  Plans delivery with CNM team.  Did go over patient information TOLAC form with her today.  5) Asthma: Mometasone and albuterol prn, no hemabate in labor  6)  Palpitations: Seen by Cardiology in pregnancy, had Holter monitor without concerning findings  7) Depression/Anxiety: On fluoxetine, monitor mood postpartum  8) History of LEEP in 2010  9) History of bulimia in remote past  10) COVID in pregnancy: Diagnosed 11/2020 and symptomatic at that time  11) GBS negative  12) MFM today recommended delivery.  Charge RN and CNTUSHAR Murillo aware of plan, patient to come at 2 PM to initiate induction.    Eunice Corrales MD

## 2021-06-30 NOTE — PROGRESS NOTES
Blood pressure 123/55, pulse 75, temperature 99.5  F (37.5  C), temperature source Oral, resp. rate 16, not currently breastfeeding.  Patient Vitals for the past 24 hrs:   BP Temp Temp src Pulse Resp   21 1502 123/55 99.5  F (37.5  C) Oral 75 16     General appearance: Pt ready for cervical exam and discussion of plan of care.  CONTRACTIONS: irreg  Pitocin- none,  Antibiotics- none  FETAL HEART TONES: continuous EFM- baseline 130 with moderate variability and positive accelerations. No decelerations.  ROM: not ruptured  PELVIC EXAM: /-1, post/firm    Law score 5    Speculum placed. Cervix and cervical os visualized. Stylet placed inside newman. Newman gentle threaded through external and internal cervical os.  Balloon inflated on uterine side to 75ml. Traction applied. Balloon in correct location. Speculum removed.  Confirmed correct placement with manual exam. Bedside ultrasound also used to confirm uterine placement.     # Pain Assessment:  Current Pain Score 2021   Patient currently in pain? yes   - Samantha is experiencing pain due to cramping. Pain management was discussed and the plan was created in a collaborative fashion.  Samantha's response to the current recommendations: engaged  - breathing, essential oil    ASSESSMENT:  ==============  IUP @ 40w0d for induction of labor: fetal hemivertebrae    Fetal Heart Rate Tracing category one  GBS- negative     /-1, post/firm  Law 5    Newman balloon placed 1640, 75ml       Patient Active Problem List   Diagnosis     Dysphonia     Family history of breast cancer in female -- maternal aunt     Family history of ovarian cancer -- Mother     Abdominal pain     Mild intermittent asthma     Cervical high risk HPV (human papillomavirus) test positive     S/P  section     Anxiety     Bulimia nervosa     COVID-19     Depressive disorder     High-risk pregnancy, elderly multigravida, unspecified trimester     At risk for ineffective breastfeeding      Known fetal anomaly, antepartum     Conceived by in vitro fertilization     Labor and delivery indication for care or intervention     PLAN:  ===========  Reviewed sve and ye score. Recommended cervical ripening with placement of newman balloon.  Pt agreeable.   Newman balloon placed using speculum. Inflated to 75ml.  Correct position confirmed with manual exam and via ultrasound.  Gentle traction applied.  Discussed potential length of time balloon may be in place- 12-24 hours. Can check during this time for position or expulsion.  Pt verbalized understanding and agrees with plan of care.     Tejal Murillo, ANDREY, CNM

## 2021-06-30 NOTE — H&P
ADMIT NOTE  =================  40w0d    Samantha Dudley is a 40 year old female with an No LMP recorded. Patient is pregnant. and Estimated Date of Delivery: 2021 is [facility] 2021 at 5:00 PM for induction of labor.  Indication: fetal hemivertebrae.     HPI  ================  Pt has been followed closely by M for fetal hemivertebrae. MFM recommended IOL today. Plan for NICU and delivery detailed in chart.   TOLAC. Consent signed today. Pt has a hx of spontaneous labor, arrived at 8cm, progressed and face presentation was found.  section performed.     Pregnancy also c/b IVF pregnancy- donor egg, AMA, asthma- seeing pulmonology, heart palpitations- see by cardiology, hx of bulimia, anxiety, depression.     Denies fever, cough, SOB or chest pain Denies having contact with anyone who is Covid-19 positive. Agreeable to Covid-19 testing    Contractions- irreg  Fetal movement- active  ROM- no.  Vaginal bleeding- none  GBS- negative  FOB- is involved, present at bedside  Other labor support- ave, rn. Has .      PROBLEM LIST  =================  Patient Active Problem List    Diagnosis Date Noted     Abdominal pain 2013     Priority: High     Labor and delivery indication for care or intervention 2021     Priority: Medium     Conceived by in vitro fertilization 2021     Priority: Medium     COVID-19 2021     Priority: Medium     At 7 wks pregnant       High-risk pregnancy, elderly multigravida, unspecified trimester 2021     Priority: Medium     ERNESTO at 38 wks. Was seeing Navid Angela for prenatal care, (16 wks x 11 visits) transferring for birth at [facility] CNM  pt  Partner's name: Tez    [ ] Rubella immune  [ ] Hep B immune/nonimmune  [x ]  No plan utox in labor   _____________________________________:   [x ] Labor plans:unmedicated  [ x] :Susan  [x ] Infant feeding plan: breast  [x ] TDAP given  [ ] Rhogam if needed, date:  [x ] TOLAC  consent done  [ ] Waterbirth declines, consent done  [ ] GCT, passed  ________________________________________    [ ] OTC PP meds sent  [ ] Planning CS-ERAS pkt         At risk for ineffective breastfeeding 2021     Priority: Medium     BF x 7wks,, wants extra support       Known fetal anomaly, antepartum 2021     Priority: Medium     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 Possible surgery needs after birth.  Fetal chart opened by M-detailed info/plans.  Need NICU at birth and let NICU team know when patient is in labor and about her requests below.  CORD BLOOD ORDERS IN FETAL CHART -  5ml in green toptube & 5 ml in purple top     Pt desires baby to her chest, delayed cord clamping if no concerns.    Per Guero :    Instructions for testing is as follows:  Testing to be performed on cord blood after delivery:     [id number] CGH with SNP array with limited G-bands. 5mL Green Sodium Heparin AND 5 mL Purple EDTA.    [id number] Next Generation Sequencin-10mLs cordblood in yellow ACD (solution A) tube OR purple EDTA tube. Yellow top tube preferred       Bulimia nervosa 2020     Priority: Medium     Depressive disorder 2020     Priority: Medium     Anxiety 2017     Priority: Medium     : Stable on fluoxetine 30mg       Family history of breast cancer in female -- maternal aunt 2013     Priority: Medium     Family history of ovarian cancer -- Mother 2013     Priority: Medium     Dysphonia 2012     Priority: Medium     S/P  section 2010     Priority: Medium     Abbott-face presentation. Traumatic experience  : further chart review found no TOLAC consent signed prior to 38wks. OR report in care everywhere. Called patient to review risks/benefits to TOLAC, reviewed risk of rupture, cEFM, IV. Discussed her option to repeat CS at anytime. Will have patient sign consent at next visit.       Mild intermittent asthma 2013     Priority: Low  "    URI and exercise induced; worse in winter. Has rescue inhaler.       Cervical high risk HPV (human papillomavirus) test positive 06/04/2013     Priority: Low     6/2014: Dx pap w cotest due/ 2013 ASCCP guidelines. ksl         HISTORIES  ============  Allergies   Allergen Reactions     Other [No Clinical Screening - See Comments] Anaphylaxis     Triamenic cough syrup.  Per patients father when she was 5 y.o.     Cat Hair Extract Itching     Past Medical History:   Diagnosis Date     Asthma      Brachial neuritis 01/01/2011    resolved with P     Cervical dysplasia 01/01/2010    treated in [place], normal f/u paps     Eating disorder     previously on Prozac     Generalized anxiety disorder 06/04/2013    Problem resolved at pt request  that it be removed from problem list.      Hoarseness      Menarche age 14    cycles q mo x 7d, heavy, w cramps     Nasal congestion      Neck pain 04/01/2011     Oral contraceptive use age 18    for cycle control     Sore throat      Trouble breathing     At night     Past Surgical History:   Procedure Laterality Date     CONIZATION LEEP  2010    \"most painful thing I've ever had\"     perianal lesion excision  child    scar on R @ 2:00 -- benign lesion      [place] teeth extraction  18   .  Family History   Problem Relation Age of Onset     Lipids Father      Hypertension Father 72     Breast Cancer Maternal Aunt 50     Ovarian Cancer Mother 58        recurrance Xs 4     Depression Mother 50     Alcohol/Drug Paternal Grandfather      Depression Sister 24     Depression Brother 24     C.A.D. No family hx of      Diabetes No family hx of      Cerebrovascular Disease No family hx of      Cancer - colorectal No family hx of      Prostate Cancer No family hx of      Thyroid Disease No family hx of      Social History     Tobacco Use     Smoking status: Never Smoker     Smokeless tobacco: Never Used   Substance Use Topics     Alcohol use: Yes     Comment: 2-3 drinks 1x/wk max     OB " History    Para Term  AB Living   2 1 1 0 0 1   SAB TAB Ectopic Multiple Live Births   0 0 0 0 1      # Outcome Date GA Lbr Sanchez/2nd Weight Sex Delivery Anes PTL Lv   2 Current            1 Term 10/14/16 39w0d   M -SEC EPI N KISHORE      Complications: Face presentation of fetus      Obstetric Comments   Arrived at 8cm, epidural, then face presentation, mec, CS.   PPH?, No GDM, HTN, --pp anxiety, therapist,  x 7        LABS:   ===========  Prenatal Labs:  Rhogam not indicated   Lab Results   Component Value Date    ABO A 2021    RH Pos 2021    AS Neg 2021    TREPAB Negative 2012    HGB 12.2 2021     Rubella immune  Lab Results   Component Value Date    GBS neg 2021     Other labs:  COVID-19 PCR Results    COVID-19 PCR Results 3/12/20 11/16/20 11/21/20 6/30/21   COVID-19 Virus (Coronavirus) PCR - [facility] Result Negative for 2019-nCoV RNA      COVID-19 Virus by PCR (External Result)  Negative Positive (A)    SARS-CoV-2 Virus Specimen Source    Nasopharyngeal   SARS-CoV-2 PCR Result    NEGATIVE   (A) Abnormal value       Comments are available for some flowsheets but are not being displayed.         COVID-19 Antibody Results, Testing for Immunity    COVID-19 Antibody Results, Testing for Immunity   No data to display.            Results for orders placed or performed during the hospital encounter of 21 (from the past 24 hour(s))   Asymptomatic SARS-CoV-2 COVID-19 Virus (Coronavirus) by PCR    Specimen: Nasopharyngeal   Result Value Ref Range    SARS-CoV-2 Virus Specimen Source Nasopharyngeal     SARS-CoV-2 PCR Result NEGATIVE     SARS-CoV-2 PCR Comment (Note)    CBC with platelets differential   Result Value Ref Range    WBC 9.3 4.0 - 11.0 10e9/L    RBC Count 3.43 (L) 3.8 - 5.2 10e12/L    Hemoglobin 12.2 11.7 - 15.7 g/dL    Hematocrit 35.8 35.0 - 47.0 %     (H) 78 - 100 fl    MCH 35.6 (H) 26.5 - 33.0 pg    MCHC 34.1 31.5 - 36.5 g/dL    RDW 13.1 10.0  - 15.0 %    Platelet Count 182 150 - 450 10e9/L    Diff Method Automated Method     % Neutrophils 77.9 %    % Lymphocytes 15.8 %    % Monocytes 5.4 %    % Eosinophils 0.3 %    % Basophils 0.2 %    % Immature Granulocytes 0.4 %    Nucleated RBCs 0 0 /100    Absolute Neutrophil 7.2 1.6 - 8.3 10e9/L    Absolute Lymphocytes 1.5 0.8 - 5.3 10e9/L    Absolute Monocytes 0.5 0.0 - 1.3 10e9/L    Absolute Eosinophils 0.0 0.0 - 0.7 10e9/L    Absolute Basophils 0.0 0.0 - 0.2 10e9/L    Abs Immature Granulocytes 0.0 0 - 0.4 10e9/L    Absolute Nucleated RBC 0.0    ABO/Rh type and screen   Result Value Ref Range    ABO A     RH(D) Pos     Antibody Screen Neg     Test Valid Only At          [facility]    Specimen Expires 2021    Nurse Prac  IP Consult    Narrative    Renetta Lovell APRN CNP     2021 10:11 PM    [facility]                Neonatology Antepartum Counseling Consult:  I was asked to provide antepartum counseling for Samantha Dudley   at the request of Tejal Murillo* secondary to possible   fetal spinal fusion anomaly of hemivertebrea and possible   VACTERL. Ms. Samantha Dudley is currently 40 weeks/ 0 days   gestation and has a history significant for:  Patient Active Problem List   Diagnosis     Dysphonia     Family history of breast cancer in female -- maternal aunt     Family history of ovarian cancer -- Mother     Abdominal pain     Mild intermittent asthma     Cervical high risk HPV (human papillomavirus) test positive     S/P  section     Anxiety     Bulimia nervosa     COVID-19     Depressive disorder     High-risk pregnancy, elderly multigravida, unspecified   trimester     At risk for ineffective breastfeeding     Known fetal anomaly, antepartum     Conceived by in vitro fertilization     Labor and delivery indication for care or intervention        Ms. Samantha Dudley, accompanied  by her , was counseled on   the expected hospital course, potential risks, and outcomes   associated with an infant born at this gestation. The counseling   included: NICU at the delivery, delayed cord clamping, passing   OG/NG to determine patency of nares and esophagus.  Discussed the   ability to breastfeed if there are no issues.  Also discussed   that patient is not an automatic admission to NICU, and will be   assessed by the team at delivery.  Patient will require genetic   testing and imaging prior to discharge. The patient had no   remaining questions but was encouraged to contact the NICU via   their caregivers should any arise. Thank you for involving the   NICU team in the care of this patient.      Floor Time (min): 5  Face to Face Time (min): 10  Total Time (minutes): 15  More than 50% of my time was spent in direct, face to face,   antepartum counseling with the above patient.    Renetta BALDERAS, CNP 2021 10:10 PM   Advanced Practice Providers  [facility]             ROS  =========  Pt denies significant respiratory, cardiovacular, GI, or muscular/skeletalcomplaints.    See RN data base ROS.     PHYSICAL EXAM:  ===============  /55   Pulse 75   Temp 99.5  F (37.5  C) (Oral)   Resp 16   General appearance: comfortable  GENERAL APPEARANCE: healthy, alert and no distress  RESP: lungs clear to auscultation - no rales, rhonchi or wheezes  CV: regular rates and rhythm, normal S1 S2, no S3 or S4 and no murmur,and no varicosities  ABDOMEN:  soft, nontender, no epigastric pain  SKIN: no suspicious lesions or rashes  NEURO: Denies headache, blurred vision, other vision changes  PSYCH: mentation appears normal. and affect normal/bright  Legs: Reflexes normal bilaterally     Abdomen: gravid, vertex fetus per Leopold's, non-tender between contractions.   Cephalic presentation confirmed by BSUS  EFW-  7 lbs.   CONTRACTIONS: irreg  FETAL  HEART TONES: continuous EFM- baseline 130 with moderate variability and positive accelerations. No decelerations.  PELVIC EXAM: deferred  BLOODY SHOW: no   ROM:no  FLUID: none  ROMPLUS: not done    # Pain Assessment:  Current Pain Score 2021   Patient currently in pain? yes   Samantha guevara pain level was assessed and she currently denies pain.        ASSESSMENT:  ==============  IUP @ 40w0d admitted for induction of labor.  Indication: fetal hemivertebrae   NST REACTIVE  Fetal Heart Tones - category one  GBS- negative  Covid- pending    TOLAC  Patient Active Problem List   Diagnosis     Dysphonia     Family history of breast cancer in female -- maternal aunt     Family history of ovarian cancer -- Mother     Abdominal pain     Mild intermittent asthma     Cervical high risk HPV (human papillomavirus) test positive     S/P  section     Anxiety     Bulimia nervosa     COVID-19     Depressive disorder     High-risk pregnancy, elderly multigravida, unspecified trimester     At risk for ineffective breastfeeding     Known fetal anomaly, antepartum     Conceived by in vitro fertilization     Labor and delivery indication for care or intervention       PLAN:  ===========  Admit - see IP orders.  Admission labs ordered- abo/rh t&d, cbc with plts, antitrep.  Pain medication options reviewed. Pt is interested in unmedicated labor and birth. Will have a .  Ambulation, hydration, position changes, birthing ball and tub options to facilitate labor reviewed with pt .  Reviewed cervical ripening and induction methods in the setting of TOLAC, including newman balloon, pitocin, arom.  Anesthesia to bedside for consult.  Will place NICU consult.  Plan reviewed for delivery. See notes and problem list.  Will finish admission process then check cervix to determine plan of care.    ANDREY Melton, SHONDA

## 2021-07-01 ENCOUNTER — ANESTHESIA EVENT (OUTPATIENT)
Dept: OBGYN | Facility: CLINIC | Age: 41
End: 2021-07-01
Payer: COMMERCIAL

## 2021-07-01 ENCOUNTER — ANESTHESIA (OUTPATIENT)
Dept: OBGYN | Facility: CLINIC | Age: 41
End: 2021-07-01
Payer: COMMERCIAL

## 2021-07-01 ENCOUNTER — ANCILLARY PROCEDURE (OUTPATIENT)
Dept: ULTRASOUND IMAGING | Facility: CLINIC | Age: 41
End: 2021-07-01
Attending: ANESTHESIOLOGY
Payer: COMMERCIAL

## 2021-07-01 LAB — T PALLIDUM AB SER QL: NONREACTIVE

## 2021-07-01 PROCEDURE — 250N000009 HC RX 250: Performed by: NURSE ANESTHETIST, CERTIFIED REGISTERED

## 2021-07-01 PROCEDURE — 250N000013 HC RX MED GY IP 250 OP 250 PS 637: Performed by: STUDENT IN AN ORGANIZED HEALTH CARE EDUCATION/TRAINING PROGRAM

## 2021-07-01 PROCEDURE — 250N000009 HC RX 250: Performed by: OBSTETRICS & GYNECOLOGY

## 2021-07-01 PROCEDURE — 271N000001 HC OR GENERAL SUPPLY NON-STERILE: Performed by: OBSTETRICS & GYNECOLOGY

## 2021-07-01 PROCEDURE — C9290 INJ, BUPIVACAINE LIPOSOME: HCPCS | Performed by: ANESTHESIOLOGY

## 2021-07-01 PROCEDURE — 3E0P7VZ INTRODUCTION OF HORMONE INTO FEMALE REPRODUCTIVE, VIA NATURAL OR ARTIFICIAL OPENING: ICD-10-PCS | Performed by: ADVANCED PRACTICE MIDWIFE

## 2021-07-01 PROCEDURE — 710N000010 HC RECOVERY PHASE 1, LEVEL 2, PER MIN: Performed by: OBSTETRICS & GYNECOLOGY

## 2021-07-01 PROCEDURE — 88307 TISSUE EXAM BY PATHOLOGIST: CPT | Mod: TC | Performed by: STUDENT IN AN ORGANIZED HEALTH CARE EDUCATION/TRAINING PROGRAM

## 2021-07-01 PROCEDURE — 250N000011 HC RX IP 250 OP 636

## 2021-07-01 PROCEDURE — 250N000009 HC RX 250: Performed by: ADVANCED PRACTICE MIDWIFE

## 2021-07-01 PROCEDURE — 250N000013 HC RX MED GY IP 250 OP 250 PS 637: Performed by: ADVANCED PRACTICE MIDWIFE

## 2021-07-01 PROCEDURE — 258N000003 HC RX IP 258 OP 636: Performed by: ADVANCED PRACTICE MIDWIFE

## 2021-07-01 PROCEDURE — 250N000011 HC RX IP 250 OP 636: Performed by: ANESTHESIOLOGY

## 2021-07-01 PROCEDURE — 999N000141 HC STATISTIC PRE-PROCEDURE NURSING ASSESSMENT: Performed by: OBSTETRICS & GYNECOLOGY

## 2021-07-01 PROCEDURE — 360N000076 HC SURGERY LEVEL 3, PER MIN: Performed by: OBSTETRICS & GYNECOLOGY

## 2021-07-01 PROCEDURE — 10907ZC DRAINAGE OF AMNIOTIC FLUID, THERAPEUTIC FROM PRODUCTS OF CONCEPTION, VIA NATURAL OR ARTIFICIAL OPENING: ICD-10-PCS | Performed by: MIDWIFE

## 2021-07-01 PROCEDURE — 250N000013 HC RX MED GY IP 250 OP 250 PS 637: Performed by: MIDWIFE

## 2021-07-01 PROCEDURE — 258N000003 HC RX IP 258 OP 636: Performed by: NURSE ANESTHETIST, CERTIFIED REGISTERED

## 2021-07-01 PROCEDURE — 272N000001 HC OR GENERAL SUPPLY STERILE: Performed by: OBSTETRICS & GYNECOLOGY

## 2021-07-01 PROCEDURE — 88307 TISSUE EXAM BY PATHOLOGIST: CPT | Mod: 26 | Performed by: PATHOLOGY

## 2021-07-01 PROCEDURE — 250N000011 HC RX IP 250 OP 636: Performed by: STUDENT IN AN ORGANIZED HEALTH CARE EDUCATION/TRAINING PROGRAM

## 2021-07-01 PROCEDURE — 999N000010 HC STATISTIC ANES STAT CODE-CRNA PER MINUTE: Performed by: OBSTETRICS & GYNECOLOGY

## 2021-07-01 PROCEDURE — 120N000002 HC R&B MED SURG/OB UMMC

## 2021-07-01 PROCEDURE — 370N000017 HC ANESTHESIA TECHNICAL FEE, PER MIN: Performed by: OBSTETRICS & GYNECOLOGY

## 2021-07-01 PROCEDURE — 250N000011 HC RX IP 250 OP 636: Performed by: NURSE ANESTHETIST, CERTIFIED REGISTERED

## 2021-07-01 RX ORDER — OXYTOCIN 10 [USP'U]/ML
INJECTION, SOLUTION INTRAMUSCULAR; INTRAVENOUS
Status: DISCONTINUED
Start: 2021-07-01 | End: 2021-07-01 | Stop reason: WASHOUT

## 2021-07-01 RX ORDER — MORPHINE SULFATE 1 MG/ML
150 INJECTION, SOLUTION EPIDURAL; INTRATHECAL; INTRAVENOUS ONCE
Status: DISCONTINUED | OUTPATIENT
Start: 2021-07-01 | End: 2021-07-02

## 2021-07-01 RX ORDER — BISACODYL 10 MG
10 SUPPOSITORY, RECTAL RECTAL DAILY PRN
Status: DISCONTINUED | OUTPATIENT
Start: 2021-07-01 | End: 2021-07-02

## 2021-07-01 RX ORDER — ONDANSETRON 2 MG/ML
INJECTION INTRAMUSCULAR; INTRAVENOUS PRN
Status: DISCONTINUED | OUTPATIENT
Start: 2021-07-01 | End: 2021-07-01

## 2021-07-01 RX ORDER — ONDANSETRON 2 MG/ML
4 INJECTION INTRAMUSCULAR; INTRAVENOUS EVERY 6 HOURS PRN
Status: DISCONTINUED | OUTPATIENT
Start: 2021-07-01 | End: 2021-07-02

## 2021-07-01 RX ORDER — NALBUPHINE HYDROCHLORIDE 10 MG/ML
2.5-5 INJECTION, SOLUTION INTRAMUSCULAR; INTRAVENOUS; SUBCUTANEOUS EVERY 6 HOURS PRN
Status: DISCONTINUED | OUTPATIENT
Start: 2021-07-01 | End: 2021-07-02

## 2021-07-01 RX ORDER — OXYTOCIN/0.9 % SODIUM CHLORIDE 30/500 ML
100 PLASTIC BAG, INJECTION (ML) INTRAVENOUS CONTINUOUS
Status: DISCONTINUED | OUTPATIENT
Start: 2021-07-02 | End: 2021-07-04 | Stop reason: HOSPADM

## 2021-07-01 RX ORDER — BUPIVACAINE HYDROCHLORIDE 2.5 MG/ML
INJECTION, SOLUTION EPIDURAL; INFILTRATION; INTRACAUDAL
Status: COMPLETED | OUTPATIENT
Start: 2021-07-01 | End: 2021-07-01

## 2021-07-01 RX ORDER — FENTANYL/BUPIVACAINE/NS/PF 2-1250MCG
PLASTIC BAG, INJECTION (ML) INJECTION
Status: COMPLETED
Start: 2021-07-01 | End: 2021-07-01

## 2021-07-01 RX ORDER — PRENATAL VIT/IRON FUM/FOLIC AC 27MG-0.8MG
1 TABLET ORAL DAILY
Status: DISCONTINUED | OUTPATIENT
Start: 2021-07-01 | End: 2021-07-04 | Stop reason: HOSPADM

## 2021-07-01 RX ORDER — ALBUTEROL SULFATE 90 UG/1
1-2 AEROSOL, METERED RESPIRATORY (INHALATION) EVERY 6 HOURS PRN
Status: DISCONTINUED | OUTPATIENT
Start: 2021-07-01 | End: 2021-07-04 | Stop reason: HOSPADM

## 2021-07-01 RX ORDER — NALOXONE HYDROCHLORIDE 0.4 MG/ML
0.4 INJECTION, SOLUTION INTRAMUSCULAR; INTRAVENOUS; SUBCUTANEOUS
Status: DISCONTINUED | OUTPATIENT
Start: 2021-07-01 | End: 2021-07-02

## 2021-07-01 RX ORDER — OXYTOCIN/0.9 % SODIUM CHLORIDE 30/500 ML
PLASTIC BAG, INJECTION (ML) INTRAVENOUS PRN
Status: DISCONTINUED | OUTPATIENT
Start: 2021-07-01 | End: 2021-07-01

## 2021-07-01 RX ORDER — SODIUM CHLORIDE, SODIUM LACTATE, POTASSIUM CHLORIDE, CALCIUM CHLORIDE 600; 310; 30; 20 MG/100ML; MG/100ML; MG/100ML; MG/100ML
INJECTION, SOLUTION INTRAVENOUS CONTINUOUS
Status: DISCONTINUED | OUTPATIENT
Start: 2021-07-01 | End: 2021-07-02

## 2021-07-01 RX ORDER — CHLORAL HYDRATE 500 MG
1 CAPSULE ORAL DAILY
Status: DISCONTINUED | OUTPATIENT
Start: 2021-07-01 | End: 2021-07-04 | Stop reason: HOSPADM

## 2021-07-01 RX ORDER — MORPHINE SULFATE 1 MG/ML
INJECTION, SOLUTION EPIDURAL; INTRATHECAL; INTRAVENOUS PRN
Status: DISCONTINUED | OUTPATIENT
Start: 2021-07-01 | End: 2021-07-01

## 2021-07-01 RX ORDER — AZITHROMYCIN 500 MG/5ML
500 INJECTION, POWDER, LYOPHILIZED, FOR SOLUTION INTRAVENOUS
Status: COMPLETED | OUTPATIENT
Start: 2021-07-01 | End: 2021-07-01

## 2021-07-01 RX ORDER — LIDOCAINE 40 MG/G
CREAM TOPICAL
Status: DISCONTINUED | OUTPATIENT
Start: 2021-07-01 | End: 2021-07-02

## 2021-07-01 RX ORDER — FENTANYL CITRATE 50 UG/ML
25-50 INJECTION, SOLUTION INTRAMUSCULAR; INTRAVENOUS
Status: CANCELLED | OUTPATIENT
Start: 2021-07-01

## 2021-07-01 RX ORDER — CEFAZOLIN SODIUM 1 G/3ML
1 INJECTION, POWDER, FOR SOLUTION INTRAMUSCULAR; INTRAVENOUS SEE ADMIN INSTRUCTIONS
Status: DISCONTINUED | OUTPATIENT
Start: 2021-07-01 | End: 2021-07-02 | Stop reason: HOSPADM

## 2021-07-01 RX ORDER — MAGNESIUM HYDROXIDE 1200 MG/15ML
LIQUID ORAL PRN
Status: DISCONTINUED | OUTPATIENT
Start: 2021-07-01 | End: 2021-07-02

## 2021-07-01 RX ORDER — ACETAMINOPHEN 325 MG/1
975 TABLET ORAL ONCE
Status: DISCONTINUED | OUTPATIENT
Start: 2021-07-01 | End: 2021-07-02

## 2021-07-01 RX ORDER — NALOXONE HYDROCHLORIDE 0.4 MG/ML
0.2 INJECTION, SOLUTION INTRAMUSCULAR; INTRAVENOUS; SUBCUTANEOUS
Status: DISCONTINUED | OUTPATIENT
Start: 2021-07-01 | End: 2021-07-02

## 2021-07-01 RX ORDER — ONDANSETRON 4 MG/1
4 TABLET, ORALLY DISINTEGRATING ORAL EVERY 6 HOURS PRN
Status: DISCONTINUED | OUTPATIENT
Start: 2021-07-01 | End: 2021-07-02

## 2021-07-01 RX ORDER — ONDANSETRON 4 MG/1
4 TABLET, ORALLY DISINTEGRATING ORAL EVERY 30 MIN PRN
Status: CANCELLED | OUTPATIENT
Start: 2021-07-01

## 2021-07-01 RX ORDER — OXYTOCIN/0.9 % SODIUM CHLORIDE 30/500 ML
1-24 PLASTIC BAG, INJECTION (ML) INTRAVENOUS CONTINUOUS
Status: DISCONTINUED | OUTPATIENT
Start: 2021-07-01 | End: 2021-07-02

## 2021-07-01 RX ORDER — OXYTOCIN/0.9 % SODIUM CHLORIDE 30/500 ML
PLASTIC BAG, INJECTION (ML) INTRAVENOUS
Status: DISCONTINUED
Start: 2021-07-01 | End: 2021-07-01 | Stop reason: HOSPADM

## 2021-07-01 RX ORDER — CEFAZOLIN SODIUM 2 G/100ML
2 INJECTION, SOLUTION INTRAVENOUS
Status: COMPLETED | OUTPATIENT
Start: 2021-07-01 | End: 2021-07-01

## 2021-07-01 RX ORDER — METOPROLOL TARTRATE 1 MG/ML
1-2 INJECTION, SOLUTION INTRAVENOUS EVERY 5 MIN PRN
Status: CANCELLED | OUTPATIENT
Start: 2021-07-01

## 2021-07-01 RX ORDER — KETOROLAC TROMETHAMINE 30 MG/ML
INJECTION, SOLUTION INTRAMUSCULAR; INTRAVENOUS PRN
Status: DISCONTINUED | OUTPATIENT
Start: 2021-07-01 | End: 2021-07-01

## 2021-07-01 RX ORDER — EPHEDRINE SULFATE 50 MG/ML
INJECTION, SOLUTION INTRAMUSCULAR; INTRAVENOUS; SUBCUTANEOUS
Status: DISCONTINUED
Start: 2021-07-01 | End: 2021-07-01 | Stop reason: HOSPADM

## 2021-07-01 RX ORDER — FENTANYL CITRATE-0.9 % NACL/PF 10 MCG/ML
PLASTIC BAG, INJECTION (ML) INTRAVENOUS CONTINUOUS PRN
Status: DISCONTINUED | OUTPATIENT
Start: 2021-07-01 | End: 2021-07-01

## 2021-07-01 RX ORDER — SODIUM CHLORIDE, SODIUM LACTATE, POTASSIUM CHLORIDE, CALCIUM CHLORIDE 600; 310; 30; 20 MG/100ML; MG/100ML; MG/100ML; MG/100ML
INJECTION, SOLUTION INTRAVENOUS CONTINUOUS PRN
Status: DISCONTINUED | OUTPATIENT
Start: 2021-07-01 | End: 2021-07-01

## 2021-07-01 RX ORDER — KETOROLAC TROMETHAMINE 30 MG/ML
30 INJECTION, SOLUTION INTRAMUSCULAR; INTRAVENOUS EVERY 6 HOURS
Status: COMPLETED | OUTPATIENT
Start: 2021-07-02 | End: 2021-07-02

## 2021-07-01 RX ORDER — FLUOXETINE 10 MG/1
30 CAPSULE ORAL DAILY
Status: DISCONTINUED | OUTPATIENT
Start: 2021-07-01 | End: 2021-07-01

## 2021-07-01 RX ORDER — DOCUSATE SODIUM 100 MG/1
100 CAPSULE, LIQUID FILLED ORAL 2 TIMES DAILY
Status: DISCONTINUED | OUTPATIENT
Start: 2021-07-01 | End: 2021-07-04 | Stop reason: HOSPADM

## 2021-07-01 RX ORDER — MISOPROSTOL 200 UG/1
TABLET ORAL
Status: DISCONTINUED
Start: 2021-07-01 | End: 2021-07-01 | Stop reason: HOSPADM

## 2021-07-01 RX ORDER — ONDANSETRON 2 MG/ML
4 INJECTION INTRAMUSCULAR; INTRAVENOUS EVERY 30 MIN PRN
Status: CANCELLED | OUTPATIENT
Start: 2021-07-01

## 2021-07-01 RX ORDER — SODIUM CHLORIDE, SODIUM LACTATE, POTASSIUM CHLORIDE, CALCIUM CHLORIDE 600; 310; 30; 20 MG/100ML; MG/100ML; MG/100ML; MG/100ML
INJECTION, SOLUTION INTRAVENOUS CONTINUOUS
Status: CANCELLED | OUTPATIENT
Start: 2021-07-01

## 2021-07-01 RX ORDER — DIMENHYDRINATE 50 MG/ML
25 INJECTION, SOLUTION INTRAMUSCULAR; INTRAVENOUS
Status: CANCELLED | OUTPATIENT
Start: 2021-07-01

## 2021-07-01 RX ORDER — SODIUM CHLORIDE, SODIUM LACTATE, POTASSIUM CHLORIDE, CALCIUM CHLORIDE 600; 310; 30; 20 MG/100ML; MG/100ML; MG/100ML; MG/100ML
INJECTION, SOLUTION INTRAVENOUS CONTINUOUS
Status: DISCONTINUED | OUTPATIENT
Start: 2021-07-01 | End: 2021-07-02 | Stop reason: HOSPADM

## 2021-07-01 RX ORDER — HYDRALAZINE HYDROCHLORIDE 20 MG/ML
2.5-5 INJECTION INTRAMUSCULAR; INTRAVENOUS EVERY 10 MIN PRN
Status: CANCELLED | OUTPATIENT
Start: 2021-07-01

## 2021-07-01 RX ORDER — LIDOCAINE HYDROCHLORIDE AND EPINEPHRINE BITARTRATE 20; .01 MG/ML; MG/ML
INJECTION, SOLUTION SUBCUTANEOUS PRN
Status: DISCONTINUED | OUTPATIENT
Start: 2021-07-01 | End: 2021-07-01

## 2021-07-01 RX ORDER — EPHEDRINE SULFATE 50 MG/ML
5 INJECTION, SOLUTION INTRAMUSCULAR; INTRAVENOUS; SUBCUTANEOUS
Status: DISCONTINUED | OUTPATIENT
Start: 2021-07-01 | End: 2021-07-02

## 2021-07-01 RX ORDER — CITRIC ACID/SODIUM CITRATE 334-500MG
30 SOLUTION, ORAL ORAL
Status: COMPLETED | OUTPATIENT
Start: 2021-07-01 | End: 2021-07-01

## 2021-07-01 RX ORDER — LIDOCAINE HYDROCHLORIDE 10 MG/ML
INJECTION, SOLUTION EPIDURAL; INFILTRATION; INTRACAUDAL; PERINEURAL
Status: DISCONTINUED
Start: 2021-07-01 | End: 2021-07-01 | Stop reason: WASHOUT

## 2021-07-01 RX ADMIN — MORPHINE SULFATE 3 MG: 1 INJECTION EPIDURAL; INTRATHECAL; INTRAVENOUS at 21:58

## 2021-07-01 RX ADMIN — SODIUM CHLORIDE, POTASSIUM CHLORIDE, SODIUM LACTATE AND CALCIUM CHLORIDE: 600; 310; 30; 20 INJECTION, SOLUTION INTRAVENOUS at 21:31

## 2021-07-01 RX ADMIN — Medication 10 ML/HR: at 18:44

## 2021-07-01 RX ADMIN — Medication 5 ML: at 21:25

## 2021-07-01 RX ADMIN — SODIUM CITRATE AND CITRIC ACID MONOHYDRATE 30 ML: 500; 334 SOLUTION ORAL at 21:15

## 2021-07-01 RX ADMIN — Medication 50 MCG/MIN: at 21:35

## 2021-07-01 RX ADMIN — ONDANSETRON 4 MG: 2 INJECTION INTRAMUSCULAR; INTRAVENOUS at 21:45

## 2021-07-01 RX ADMIN — Medication 12.5 MG: at 01:11

## 2021-07-01 RX ADMIN — OXYTOCIN-SODIUM CHLORIDE 0.9% IV SOLN 30 UNIT/500ML 300 ML/HR: 30-0.9/5 SOLUTION at 21:54

## 2021-07-01 RX ADMIN — SODIUM CHLORIDE, POTASSIUM CHLORIDE, SODIUM LACTATE AND CALCIUM CHLORIDE: 600; 310; 30; 20 INJECTION, SOLUTION INTRAVENOUS at 03:04

## 2021-07-01 RX ADMIN — BISACODYL 10 MG: 10 SUPPOSITORY RECTAL at 14:47

## 2021-07-01 RX ADMIN — BUPIVACAINE 20 ML: 13.3 INJECTION, SUSPENSION, LIPOSOMAL INFILTRATION at 22:50

## 2021-07-01 RX ADMIN — Medication 5 ML: at 21:15

## 2021-07-01 RX ADMIN — FLUOXETINE HYDROCHLORIDE 30 MG: 20 CAPSULE ORAL at 08:03

## 2021-07-01 RX ADMIN — Medication 1 MILLI-UNITS/MIN: at 02:59

## 2021-07-01 RX ADMIN — Medication 5 ML: at 21:33

## 2021-07-01 RX ADMIN — KETOROLAC TROMETHAMINE 30 MG: 30 INJECTION, SOLUTION INTRAMUSCULAR at 22:42

## 2021-07-01 RX ADMIN — SODIUM CHLORIDE, POTASSIUM CHLORIDE, SODIUM LACTATE AND CALCIUM CHLORIDE: 600; 310; 30; 20 INJECTION, SOLUTION INTRAVENOUS at 09:51

## 2021-07-01 RX ADMIN — Medication 500 MG: at 21:41

## 2021-07-01 RX ADMIN — Medication 5 ML: at 21:56

## 2021-07-01 RX ADMIN — Medication 2 G: at 21:35

## 2021-07-01 RX ADMIN — SODIUM CHLORIDE, POTASSIUM CHLORIDE, SODIUM LACTATE AND CALCIUM CHLORIDE: 600; 310; 30; 20 INJECTION, SOLUTION INTRAVENOUS at 16:28

## 2021-07-01 RX ADMIN — BUPIVACAINE HYDROCHLORIDE 20 ML: 2.5 INJECTION, SOLUTION EPIDURAL; INFILTRATION; INTRACAUDAL at 22:50

## 2021-07-01 RX ADMIN — TRANEXAMIC ACID 1 G: 10 INJECTION, SOLUTION INTRAVENOUS at 22:06

## 2021-07-01 NOTE — CONSULTS
[facility]                Neonatology Antepartum Counseling Consult:  I was asked to provide antepartum counseling for Samantha Dudley at the request of Tejal Murillo* secondary to possible fetal spinal fusion anomaly of hemivertebrea and possible VACTERL. Ms. Samantha Dudley is currently 40 weeks/ 0 days gestation and has a history significant for:  Patient Active Problem List   Diagnosis     Dysphonia     Family history of breast cancer in female -- maternal aunt     Family history of ovarian cancer -- Mother     Abdominal pain     Mild intermittent asthma     Cervical high risk HPV (human papillomavirus) test positive     S/P  section     Anxiety     Bulimia nervosa     COVID-19     Depressive disorder     High-risk pregnancy, elderly multigravida, unspecified trimester     At risk for ineffective breastfeeding     Known fetal anomaly, antepartum     Conceived by in vitro fertilization     Labor and delivery indication for care or intervention        Ms. Samantha Dudley, accompanied by her , was counseled on the expected hospital course, potential risks, and outcomes associated with an infant born at this gestation. The counseling included: NICU at the delivery, delayed cord clamping, passing OG/NG to determine patency of nares and esophagus.  Discussed the ability to breastfeed if there are no issues.  Also discussed that patient is not an automatic admission to NICU, and will be assessed by the team at delivery.  Patient will require genetic testing and imaging prior to discharge. The patient had no remaining questions but was encouraged to contact the NICU via their caregivers should any arise. Thank you for involving the NICU team in the care of this patient.      Floor Time (min): 5  Face to Face Time (min): 10  Total Time (minutes): 15  More than 50% of my time was spent in direct, face to face, antepartum counseling with the above  patient.    Renetta ZAZUETAJocelyn BALDERAS, CNP 2021 10:10 PM   Advanced Practice Providers  [facility]

## 2021-07-01 NOTE — PROGRESS NOTES
Blood pressure 123/55, pulse 75, temperature 99.5  F (37.5  C), temperature source Oral, resp. rate 16, not currently breastfeeding.  Patient Vitals for the past 24 hrs:   BP Temp Temp src Pulse Resp   21 1502 123/55 99.5  F (37.5  C) Oral 75 16     General appearance: Reports she is feeling a lot more uncomfortable since approx 30 minutes after balloon placement. Feels like contractions are about every 2 minutes. Wondering about her progress.   CONTRACTIONS: every 3-5 minutes  Pitocin- none,  Antibiotics- none  FETAL HEART TONES: continuous EFM- baseline 135 with moderate variability and positive accelerations. No decelerations.  ROM: not ruptured  PELVIC EXAM: 2cm cervix felt around balloon. Remains in place    # Pain Assessment:  Current Pain Score 2021   Patient currently in pain? yes   - Samantha is experiencing pain due to contractions. Pain management was discussed and the plan was created in a collaborative fashion.  Samantha's response to the current recommendations: engaged  - position change, warm packs, birthing ball    ASSESSMENT:  ==============  IUP @ 40w0d for induction of labor: fetal hemivertebrae    Fetal Heart Rate Tracing category one  GBS- negative    Newman balloon at 1640, in place 75ml  2cm cervix around balloon    TOLAC    Patient Active Problem List   Diagnosis     Dysphonia     Family history of breast cancer in female -- maternal aunt     Family history of ovarian cancer -- Mother     Abdominal pain     Mild intermittent asthma     Cervical high risk HPV (human papillomavirus) test positive     S/P  section     Anxiety     Bulimia nervosa     COVID-19     Depressive disorder     High-risk pregnancy, elderly multigravida, unspecified trimester     At risk for ineffective breastfeeding     Known fetal anomaly, antepartum     Conceived by in vitro fertilization     Labor and delivery indication for care or intervention     PLAN:  ===========  Gentle traction again applied to newman  balloon.  Encouraged birthing ball, peanut ball, position changes.   Pt to update her .    NP inpatient consult placed.  Reevaluate prn per maternal/fetal indications.    ANDREY Melton, CNM

## 2021-07-01 NOTE — PROVIDER NOTIFICATION
07/01/21 0020   Provider Notification   Provider Name/Title DAT Murillo CNM   Method of Notification At Bedside   Request Evaluate in Person   Notification Reason SVE   CNM at bedside for SVE and POC discussion. Pt agreeable with POC to initiate pitocin.

## 2021-07-01 NOTE — PROGRESS NOTES
Labor progress note    S:  CNM attending 2 births  Updated pt progress at rounds and per RN as needed   Pt remains comfortable with contractions  Has requested RN  to not increase pitocin and has requested to rest an hour now      O:  Blood pressure 97/56, pulse 78, temperature 98.8  F (37.1  C), temperature source Oral, resp. rate 16, not currently breastfeeding.  General appearance: comfortable.  Contractions: Every 4-5 minutes. 60  seconds duration.  Palpate: mild.  FHT: Baseline 130 with mod  variability. Accelerations are present. No  decelerations present.  ROM: not ruptured.  Pelvic exam:deferred  Pitocin- 12 mu/min.,  Antibiotics- none  A:  IUP @ 40w1d [id number]  TOLAC, IOL for fetal indications  early labor   Fetal Heart rate tracing Category category one  GBS- negative  Patient Active Problem List   Diagnosis     Dysphonia     Family history of breast cancer in female -- maternal aunt     Family history of ovarian cancer -- Mother     Abdominal pain     Mild intermittent asthma     Cervical high risk HPV (human papillomavirus) test positive     S/P  section     Anxiety     Bulimia nervosa     COVID-19     Depressive disorder     High-risk pregnancy, elderly multigravida, unspecified trimester     At risk for ineffective breastfeeding     Known fetal anomaly, antepartum     Conceived by in vitro fertilization     Labor and delivery indication for care or intervention         P:  Anticipate MARTHA GAMEZ consultant on call Dr Mcdonald/ available prn  Labor augmentation with Pitocin  reevaluate in 2-4 hours/PRN     ANDREY Mills CNM

## 2021-07-01 NOTE — PLAN OF CARE
Laboring in room, using birth-ball and different labor positions, coping well with labor. Rojas bulb placed at 1700.  See flow sheet for FHR and contraction pattern.  1:1 RN care provided .Will continue to monitor and will notify provider if there is a change in status. Anticipate .

## 2021-07-01 NOTE — PROGRESS NOTES
After discussing POC with pt, FOB, and CNM, pt requested an hour of sleep prior to initiating pitocin. CNM agreeable with request. See MAR for sleep aid medication.

## 2021-07-01 NOTE — PROGRESS NOTES
"Had bedside talk with patient. She states she had a telephone conversation with a member of the anesthesia department but unable to recall their name. Review of records does not show a record of this conversation. Pt states the conversation was regarding her feeling groggy and out of it after previous spinal and C section. Review of her records in care everywhere does not show opioid use or benzo use during previous C section. There also does not appear to be any significant medical comorbidities that would require further consultation. Patient was reassured that any neuraxial anesthesia would avoid benzo use and we would use the least amount of medications as possible. From an anesthetic point of view, patient would be ok to receive a standard epidural or spinal anesthetic.    Robel Gray MD CA-1   Department of Anesthesiology  [phone]      I spoke with Ms Wolf on 6/18 regarding her anesthetic for her c/s 4 years ago where she was concerned that the sedation administered made her feel \"out of it\". A copy of the record was requested and I reviewed it today. There does not appear to be any medication documented except for the epidural medications.    My recommendation is for her to request no sedation/IV medication should she need a r c/s with spinal anesthesia. We discussed that sedation might be needed but that the team would tell her/ discuss with her first. She is also aware that her and the baby's status could necessitate a GA should she need a STAT c/s.    He plan is to TOLAC without an epidural rather to use nitrous analgesia.    I will d/w the call attending.    Jazlyn Henning  "

## 2021-07-01 NOTE — PROGRESS NOTES
Blood pressure 109/58, pulse 78, temperature 98.9  F (37.2  C), temperature source Oral, resp. rate 18, not currently breastfeeding.  Patient Vitals for the past 24 hrs:   BP Temp Temp src Pulse Resp   21 2325 109/58 98.9  F (37.2  C) Oral -- 18   21 1800 120/74 98.6  F (37  C) Oral 78 18   21 1502 123/55 99.5  F (37.5  C) Oral 75 16     General appearance: Notified by RN that newman balloon significantly moved at approximately 2330. Believes balloon might be in vagina. Pt erady for exam. Feels contractions have spaced.   CONTRACTIONS: every 5-7 minutes  Pitocin- none,  Antibiotics- none  FETAL HEART TONES: continuous EFM- baseline 140 with moderate variability and positive accelerations. No decelerations.  ROM: not ruptured  PELVIC EXAM: Newman balloon sitting in vaginal vault, deflated.     SVE 5-60/-2, soft/posterior    # Pain Assessment:  Current Pain Score 2021   Patient currently in pain? yes   - Samantha is experiencing pain due to contractions. Pain management was discussed and the plan was created in a collaborative fashion.  Samantha's response to the current recommendations: engaged  - position change    ASSESSMENT:  ==============  IUP @ 40w1d for induction of labor.  Indication: fetal hemivertebrae   Fetal Heart Rate Tracing category one  GBS- negative    Newman balloon placed 1740, out 2330, removed 0020    Cervical change     Patient Active Problem List   Diagnosis     Dysphonia     Family history of breast cancer in female -- maternal aunt     Family history of ovarian cancer -- Mother     Abdominal pain     Mild intermittent asthma     Cervical high risk HPV (human papillomavirus) test positive     S/P  section     Anxiety     Bulimia nervosa     COVID-19     Depressive disorder     High-risk pregnancy, elderly multigravida, unspecified trimester     At risk for ineffective breastfeeding     Known fetal anomaly, antepartum     Conceived by in vitro fertilization     Labor and  delivery indication for care or intervention     PLAN:  ===========  Reviewed sve and ye score.  Recommended initiation of pitocin.  Pt desires to start pitocin low dose at this time in order to take a short nap.  Would like to take 12.5mg of unisom. Order placed.  Delivery table made.  Acquiring correct tubes for cord blood collection.  Reevaluate prn per maternal/fetal indications.    ANDREY Melton, CNM

## 2021-07-01 NOTE — PROGRESS NOTES
Labor progress note    S:  Pt is now noting stronger contractions  Stops to concentrate and breathe through them  She is requesting cervical assessment now  Spouse and  at her side  Pt has been up on ball  Discussion of continuing IV pitocin titration as able and could assess for AROM with exam        O:  Blood pressure 100/55, pulse 78, temperature 99  F (37.2  C), temperature source Oral, resp. rate 16, not currently breastfeeding.  General appearance: uncomfortable with contractions.  Contractions: Every 2-4 minutes. 60-80 seconds duration.  Palpate: moderate.  FHT: Baseline 130 with mod  variability. Accelerations are present. No  decelerations present.  ROM: not ruptured.   Pelvic exam:cervix is VERY POSTERIOR pt quite uncomfortable when cervix reached  5-6/ 75%/ Posterior/ soft/ -2 not well applied  AROM deferred     Pitocin- 14 mu/min.,  Antibiotics- none      A:    Hx 1 prior C/S planning TOLAC IUP @ 40w1d IOL for fetal concerns Hemivertebrae    Fetal Heart rate tracing Category  one  GBS- negative  Patient Active Problem List   Diagnosis     Dysphonia     Family history of breast cancer in female -- maternal aunt     Family history of ovarian cancer -- Mother     Abdominal pain     Mild intermittent asthma     Cervical high risk HPV (human papillomavirus) test positive     S/P  section     Anxiety     Bulimia nervosa     COVID-19     Depressive disorder     High-risk pregnancy, elderly multigravida, unspecified trimester     At risk for ineffective breastfeeding     Known fetal anomaly, antepartum     Conceived by in vitro fertilization     Labor and delivery indication for care or intervention           P:  comfort measures prn   Anticipate   MD consultant on call Dr Mcdonald / available prn  Labor augmentation with Pitocin  reevaluate in 2-4 hours/PRN   Assess for AROM in 2-4 hours   ANDREY Mills CNM

## 2021-07-01 NOTE — PROGRESS NOTES
Blood pressure 109/58, pulse 78, temperature 98.9  F (37.2  C), temperature source Oral, resp. rate 18, not currently breastfeeding.  Patient Vitals for the past 24 hrs:   BP Temp Temp src Pulse Resp   21 2325 109/58 98.9  F (37.2  C) Oral -- 18   21 1800 120/74 98.6  F (37  C) Oral 78 18   21 1502 123/55 99.5  F (37.5  C) Oral 75 16     General appearance: Moved to new room. Pearl monitor being set up. Was able to rest after unisom. Ready for pitocin.   CONTRACTIONS: every 2-6 minutes  Pitocin- to start,  Antibiotics- none  FETAL HEART TONES: continuous EFM- baseline 120 with moderate variability and positive accelerations. No decelerations.  ROM: not ruptured  PELVIC EXAM:deferred    # Pain Assessment:  Current Pain Score 2021   Patient currently in pain? yes   - Samantha is experiencing pain due to contractions. Pain management was discussed and the plan was created in a collaborative fashion.  Samantha's response to the current recommendations: engaged  - unmedicated    ASSESSMENT:  ==============  IUP @ 40w1d for induction of labor.  Indication: fetal hemivertebrae   Fetal Heart Rate Tracing category one  GBS- negative     TOLAC    S/p newman balloon       Patient Active Problem List   Diagnosis     Dysphonia     Family history of breast cancer in female -- maternal aunt     Family history of ovarian cancer -- Mother     Abdominal pain     Mild intermittent asthma     Cervical high risk HPV (human papillomavirus) test positive     S/P  section     Anxiety     Bulimia nervosa     COVID-19     Depressive disorder     High-risk pregnancy, elderly multigravida, unspecified trimester     At risk for ineffective breastfeeding     Known fetal anomaly, antepartum     Conceived by in vitro fertilization     Labor and delivery indication for care or intervention     PLAN:  ===========  To start pitocin.  Titrate to adequate contraction pattern.  Reevaluate prn per maternal/fetal  indications.    ANDREY Melton, LONDONM

## 2021-07-01 NOTE — PROGRESS NOTES
Blood pressure 123/55, pulse 75, temperature 99.5  F (37.5  C), temperature source Oral, resp. rate 16, not currently breastfeeding.  Patient Vitals for the past 24 hrs:   BP Temp Temp src Pulse Resp   21 1502 123/55 99.5  F (37.5  C) Oral 75 16     General appearance: Pt spoke with  who recommended peanut ball. Pt was able to rest a little. Still feeling contractions.   CONTRACTIONS: every 3-7 minutes  Pitocin- none,  Antibiotics- none  FETAL HEART TONES: continuous EFM- baseline 140 with moderate variability and positive accelerations. No decelerations.  ROM: not ruptured  PELVIC EXAM:deferred- newman balloon remains in place    # Pain Assessment:  Current Pain Score 2021   Patient currently in pain? yes   - Samantha is experiencing pain due to contractions. Pain management was discussed and the plan was created in a collaborative fashion.  Samantha's response to the current recommendations: engaged  - unmedicated labor and birth    ASSESSMENT:  ==============  IUP @ 40w0d for induction of labor: fetal hemivertebrae    Fetal Heart Rate Tracing category one  GBS- negative     Newman balloon at 1640, in place 75ml     TOLAC  Patient Active Problem List   Diagnosis     Dysphonia     Family history of breast cancer in female -- maternal aunt     Family history of ovarian cancer -- Mother     Abdominal pain     Mild intermittent asthma     Cervical high risk HPV (human papillomavirus) test positive     S/P  section     Anxiety     Bulimia nervosa     COVID-19     Depressive disorder     High-risk pregnancy, elderly multigravida, unspecified trimester     At risk for ineffective breastfeeding     Known fetal anomaly, antepartum     Conceived by in vitro fertilization     Labor and delivery indication for care or intervention     PLAN:  ===========  Ambulation, hydration, position changes, birthing ball/sling and tub options to facilitate labor.   Maintain gentle traction on newman balloon.  Consider  reevaluation with cervical exam at [id number] or prn per maternal/fetal indications.     Tejal Murillo, ANDREY, CNM

## 2021-07-01 NOTE — PLAN OF CARE
Labor Shift Note  Data: Contraction irregular, palpate mild. Fetal assessment category one overall with period of category two.   Vitals:    21 1800 21 2325 21 0235 21 0507   BP: 120/74 109/58 109/65 103/54   Pulse: 78      Resp: 18 18 20 16   Temp: 98.6  F (37  C) 98.9  F (37.2  C) 98.2  F (36.8  C) 98.9  F (37.2  C)   TempSrc: Oral Oral Oral Oral   Signs and symptoms of infection absent.  Blood pressures WNL. Signs and symptoms of pre-eclampsia: absent.  Support person Tez present.  Interventions: Continue uterine/fetal assessment continuously. Vital Signs per order set. Comfort measures as needed.  Medicated for none.  Plan: Anticipate . Provide labor/coping assistance as needed by patient and support person.  Observe for and notify care provider of indications of progressing labor, need for pain medications,  or signs of fetal/maternal compromise.

## 2021-07-01 NOTE — PLAN OF CARE
"Patient rested and slept for a while, verbalizes she is \"ready to get this labor going!\"  AM cares completed, froy  restarted, patient up about in her room,  on her way. Continue plan of care, anticipate .  Notified Andie MERCHANT.   "

## 2021-07-01 NOTE — PROGRESS NOTES
Pitocin Initiation Note  Data:  Contractions: Irregular and Occasional frequency q 7-10 minutes.  FHT's category one.  Cerivix exam: 5.5, 60, -1.   Interventions:  Pitocin induction/augmentation orders obtained.    Plan:  Start pitocin per orders.  Anticipate .  Notify provider of progressing labor, needs for pain medication, or maternal/fetal distress.

## 2021-07-01 NOTE — PROGRESS NOTES
Labor progress note    S:  At 1740 pt is willing to have cervix rechecked and now is agreeable to AROM with exam  Pt up to use bathroom back in bed at 1800 ready now     O:  Blood pressure 113/63, pulse 81, temperature 98.9  F (37.2  C), temperature source Oral, resp. rate 19, not currently breastfeeding.  General appearance: uncomfortable with contractions.  Contractions: Every 2-3 minutes. 60-70 seconds duration.  Palpate: moderate.  FHT: Baseline 140 with mod variability. Accelerations present. no decelerations present.  15 min period of late decels x 4  Resolved with repositioning per RN   IV bolus of 200 CC given at that time   ROM: light meconium fluid .   Pelvic exam: 6-7/ 90%/ Mid/ soft/ -1 well applied with BOW palpated     Pitocin- 19 mu/min.,  Antibiotics- none    A:  IUP @ 40w1d IOL fetal indications, TOLAC active labor thin meconium noted   Fetal Heart rate tracing Category  one  GBS- negative  Patient Active Problem List   Diagnosis     Dysphonia     Family history of breast cancer in female -- maternal aunt     Family history of ovarian cancer -- Mother     Abdominal pain     Mild intermittent asthma     Cervical high risk HPV (human papillomavirus) test positive     S/P  section     Anxiety     Bulimia nervosa     COVID-19     Depressive disorder     High-risk pregnancy, elderly multigravida, unspecified trimester     At risk for ineffective breastfeeding     Known fetal anomaly, antepartum     Conceived by in vitro fertilization     Labor and delivery indication for care or intervention         P:  comfort measures prn   Using position changes peanut ball nitrous   Anticipate   MD consultant on call Dr Mcdonald / available prn  reevaluate in 2-4 hours/PRN  AROM now    Plan NICU at birth    ANDREY Mills CNM

## 2021-07-01 NOTE — PROGRESS NOTES
Labor progress note    S:  Anesthesia was in to see pt and review prior consultation  Note entered  Pt received suppository with some relief  Pt is agreeable to cervical assessment  Uncomfortable with contractions   and spouse at her side  Reviewed option of facilitating progress with AROM now with exam  vtx well appllied  Pt declines at this time and wishes to get into tub now     O:  Blood pressure 104/53, pulse 78, temperature 99  F (37.2  C), temperature source Oral, resp. rate 18, not currently breastfeeding.  General appearance: uncomfortable with contractions.  Contractions: Every 2-3 minutes. 60-80 seconds duration.  Palpate: moderate.  FHT: Baseline 135 with mod  variability. Accelerations are present. occ variable  decelerations present.  ROM: not ruptured. Declined with exam    Pelvic exam: 6/ 90%/ Posterior/ soft/ -1  Pitocin- 18 mu/min.,  Antibiotics- none    A:    TOLAC IUP @ 40w1d  IOL fetal indications active labor   Fetal Heart rate tracing Category  one  GBS- negative  Patient Active Problem List   Diagnosis     Dysphonia     Family history of breast cancer in female -- maternal aunt     Family history of ovarian cancer -- Mother     Abdominal pain     Mild intermittent asthma     Cervical high risk HPV (human papillomavirus) test positive     S/P  section     Anxiety     Bulimia nervosa     COVID-19     Depressive disorder     High-risk pregnancy, elderly multigravida, unspecified trimester     At risk for ineffective breastfeeding     Known fetal anomaly, antepartum     Conceived by in vitro fertilization     Labor and delivery indication for care or intervention         P:  comfort measures prn   Pain medication nitrous oxide   Anticipate   MD consultant on call / available prn  Labor augmentation with Pitocin  Offer AROM declined at this time   reevaluate in 2-4 hours/PRN     ANDREY Mills CNM

## 2021-07-02 LAB — HGB BLD-MCNC: 10.8 G/DL (ref 11.7–15.7)

## 2021-07-02 PROCEDURE — 120N000002 HC R&B MED SURG/OB UMMC

## 2021-07-02 PROCEDURE — 36415 COLL VENOUS BLD VENIPUNCTURE: CPT | Performed by: STUDENT IN AN ORGANIZED HEALTH CARE EDUCATION/TRAINING PROGRAM

## 2021-07-02 PROCEDURE — 250N000011 HC RX IP 250 OP 636: Performed by: ANESTHESIOLOGY

## 2021-07-02 PROCEDURE — 250N000013 HC RX MED GY IP 250 OP 250 PS 637: Performed by: STUDENT IN AN ORGANIZED HEALTH CARE EDUCATION/TRAINING PROGRAM

## 2021-07-02 PROCEDURE — 250N000011 HC RX IP 250 OP 636: Performed by: STUDENT IN AN ORGANIZED HEALTH CARE EDUCATION/TRAINING PROGRAM

## 2021-07-02 PROCEDURE — 250N000009 HC RX 250: Performed by: STUDENT IN AN ORGANIZED HEALTH CARE EDUCATION/TRAINING PROGRAM

## 2021-07-02 PROCEDURE — 250N000013 HC RX MED GY IP 250 OP 250 PS 637: Performed by: MIDWIFE

## 2021-07-02 PROCEDURE — 258N000003 HC RX IP 258 OP 636: Performed by: ADVANCED PRACTICE MIDWIFE

## 2021-07-02 PROCEDURE — 250N000013 HC RX MED GY IP 250 OP 250 PS 637: Performed by: ADVANCED PRACTICE MIDWIFE

## 2021-07-02 PROCEDURE — 85018 HEMOGLOBIN: CPT | Performed by: STUDENT IN AN ORGANIZED HEALTH CARE EDUCATION/TRAINING PROGRAM

## 2021-07-02 RX ORDER — DIPHENHYDRAMINE HYDROCHLORIDE 50 MG/ML
25 INJECTION INTRAMUSCULAR; INTRAVENOUS EVERY 6 HOURS PRN
Status: DISCONTINUED | OUTPATIENT
Start: 2021-07-02 | End: 2021-07-04 | Stop reason: HOSPADM

## 2021-07-02 RX ORDER — DIPHENHYDRAMINE HCL 25 MG
25 CAPSULE ORAL EVERY 6 HOURS PRN
Status: DISCONTINUED | OUTPATIENT
Start: 2021-07-02 | End: 2021-07-04 | Stop reason: HOSPADM

## 2021-07-02 RX ORDER — AMOXICILLIN 250 MG
2 CAPSULE ORAL 2 TIMES DAILY
Status: DISCONTINUED | OUTPATIENT
Start: 2021-07-02 | End: 2021-07-04 | Stop reason: HOSPADM

## 2021-07-02 RX ORDER — METHYLERGONOVINE MALEATE 0.2 MG/ML
200 INJECTION INTRAVENOUS
Status: DISCONTINUED | OUTPATIENT
Start: 2021-07-02 | End: 2021-07-04 | Stop reason: HOSPADM

## 2021-07-02 RX ORDER — IBUPROFEN 800 MG/1
800 TABLET, FILM COATED ORAL EVERY 6 HOURS
Status: DISCONTINUED | OUTPATIENT
Start: 2021-07-02 | End: 2021-07-04 | Stop reason: HOSPADM

## 2021-07-02 RX ORDER — OXYTOCIN 10 [USP'U]/ML
10 INJECTION, SOLUTION INTRAMUSCULAR; INTRAVENOUS
Status: DISCONTINUED | OUTPATIENT
Start: 2021-07-02 | End: 2021-07-04 | Stop reason: HOSPADM

## 2021-07-02 RX ORDER — OXYTOCIN/0.9 % SODIUM CHLORIDE 30/500 ML
340 PLASTIC BAG, INJECTION (ML) INTRAVENOUS CONTINUOUS PRN
Status: DISCONTINUED | OUTPATIENT
Start: 2021-07-02 | End: 2021-07-04 | Stop reason: HOSPADM

## 2021-07-02 RX ORDER — ONDANSETRON 2 MG/ML
4 INJECTION INTRAMUSCULAR; INTRAVENOUS EVERY 6 HOURS PRN
Status: DISCONTINUED | OUTPATIENT
Start: 2021-07-02 | End: 2021-07-04 | Stop reason: HOSPADM

## 2021-07-02 RX ORDER — LIDOCAINE 40 MG/G
CREAM TOPICAL
Status: DISCONTINUED | OUTPATIENT
Start: 2021-07-02 | End: 2021-07-04 | Stop reason: HOSPADM

## 2021-07-02 RX ORDER — TRANEXAMIC ACID 10 MG/ML
1 INJECTION, SOLUTION INTRAVENOUS EVERY 30 MIN PRN
Status: DISCONTINUED | OUTPATIENT
Start: 2021-07-02 | End: 2021-07-04 | Stop reason: HOSPADM

## 2021-07-02 RX ORDER — DEXTROSE, SODIUM CHLORIDE, SODIUM LACTATE, POTASSIUM CHLORIDE, AND CALCIUM CHLORIDE 5; .6; .31; .03; .02 G/100ML; G/100ML; G/100ML; G/100ML; G/100ML
INJECTION, SOLUTION INTRAVENOUS CONTINUOUS
Status: DISCONTINUED | OUTPATIENT
Start: 2021-07-02 | End: 2021-07-04 | Stop reason: HOSPADM

## 2021-07-02 RX ORDER — HYDROXYZINE HYDROCHLORIDE 25 MG/1
25 TABLET, FILM COATED ORAL EVERY 6 HOURS PRN
Status: DISCONTINUED | OUTPATIENT
Start: 2021-07-02 | End: 2021-07-04 | Stop reason: HOSPADM

## 2021-07-02 RX ORDER — SIMETHICONE 80 MG
80 TABLET,CHEWABLE ORAL 4 TIMES DAILY PRN
Status: DISCONTINUED | OUTPATIENT
Start: 2021-07-02 | End: 2021-07-04 | Stop reason: HOSPADM

## 2021-07-02 RX ORDER — ACETAMINOPHEN 325 MG/1
975 TABLET ORAL EVERY 6 HOURS
Status: DISCONTINUED | OUTPATIENT
Start: 2021-07-02 | End: 2021-07-04 | Stop reason: HOSPADM

## 2021-07-02 RX ORDER — MISOPROSTOL 200 UG/1
400 TABLET ORAL
Status: DISCONTINUED | OUTPATIENT
Start: 2021-07-02 | End: 2021-07-04 | Stop reason: HOSPADM

## 2021-07-02 RX ORDER — OXYCODONE HYDROCHLORIDE 5 MG/1
5 TABLET ORAL EVERY 4 HOURS PRN
Status: DISCONTINUED | OUTPATIENT
Start: 2021-07-02 | End: 2021-07-04 | Stop reason: HOSPADM

## 2021-07-02 RX ORDER — AMOXICILLIN 250 MG
1 CAPSULE ORAL 2 TIMES DAILY
Status: DISCONTINUED | OUTPATIENT
Start: 2021-07-02 | End: 2021-07-04 | Stop reason: HOSPADM

## 2021-07-02 RX ORDER — BISACODYL 10 MG
10 SUPPOSITORY, RECTAL RECTAL DAILY PRN
Status: DISCONTINUED | OUTPATIENT
Start: 2021-07-03 | End: 2021-07-04 | Stop reason: HOSPADM

## 2021-07-02 RX ORDER — HYDROCORTISONE 2.5 %
CREAM (GRAM) TOPICAL 3 TIMES DAILY PRN
Status: DISCONTINUED | OUTPATIENT
Start: 2021-07-02 | End: 2021-07-04 | Stop reason: HOSPADM

## 2021-07-02 RX ORDER — MODIFIED LANOLIN
OINTMENT (GRAM) TOPICAL
Status: DISCONTINUED | OUTPATIENT
Start: 2021-07-02 | End: 2021-07-04 | Stop reason: HOSPADM

## 2021-07-02 RX ORDER — HYDROXYZINE HYDROCHLORIDE 50 MG/1
50 TABLET, FILM COATED ORAL EVERY 6 HOURS PRN
Status: DISCONTINUED | OUTPATIENT
Start: 2021-07-02 | End: 2021-07-04 | Stop reason: HOSPADM

## 2021-07-02 RX ADMIN — KETOROLAC TROMETHAMINE 30 MG: 30 INJECTION, SOLUTION INTRAMUSCULAR; INTRAVENOUS at 05:19

## 2021-07-02 RX ADMIN — KETOROLAC TROMETHAMINE 30 MG: 30 INJECTION, SOLUTION INTRAMUSCULAR; INTRAVENOUS at 10:34

## 2021-07-02 RX ADMIN — MOMETASONE FUROATE 1 PUFF: 220 INHALANT RESPIRATORY (INHALATION) at 19:38

## 2021-07-02 RX ADMIN — DIPHENHYDRAMINE HYDROCHLORIDE 25 MG: 25 CAPSULE ORAL at 05:22

## 2021-07-02 RX ADMIN — Medication 100 ML/HR: at 00:03

## 2021-07-02 RX ADMIN — NALBUPHINE HYDROCHLORIDE 2.5 MG: 10 INJECTION, SOLUTION INTRAMUSCULAR; INTRAVENOUS; SUBCUTANEOUS at 04:04

## 2021-07-02 RX ADMIN — SIMETHICONE CHEW TAB 80 MG 80 MG: 80 TABLET ORAL at 10:33

## 2021-07-02 RX ADMIN — ACETAMINOPHEN 975 MG: 325 TABLET, FILM COATED ORAL at 14:26

## 2021-07-02 RX ADMIN — HYDROXYZINE HYDROCHLORIDE 50 MG: 50 TABLET, FILM COATED ORAL at 21:06

## 2021-07-02 RX ADMIN — ACETAMINOPHEN 975 MG: 325 TABLET, FILM COATED ORAL at 19:36

## 2021-07-02 RX ADMIN — DOCUSATE SODIUM 100 MG: 100 CAPSULE, LIQUID FILLED ORAL at 19:36

## 2021-07-02 RX ADMIN — IBUPROFEN 800 MG: 800 TABLET, FILM COATED ORAL at 22:18

## 2021-07-02 RX ADMIN — KETOROLAC TROMETHAMINE 30 MG: 30 INJECTION, SOLUTION INTRAMUSCULAR; INTRAVENOUS at 16:54

## 2021-07-02 RX ADMIN — DOCUSATE SODIUM 100 MG: 100 CAPSULE, LIQUID FILLED ORAL at 07:47

## 2021-07-02 RX ADMIN — PRENATAL VITAMINS-IRON FUMARATE 27 MG IRON-FOLIC ACID 0.8 MG TABLET 1 TABLET: at 10:34

## 2021-07-02 RX ADMIN — SIMETHICONE CHEW TAB 80 MG 80 MG: 80 TABLET ORAL at 19:44

## 2021-07-02 RX ADMIN — Medication 1 G: at 16:55

## 2021-07-02 RX ADMIN — SODIUM CHLORIDE, POTASSIUM CHLORIDE, SODIUM LACTATE AND CALCIUM CHLORIDE: 600; 310; 30; 20 INJECTION, SOLUTION INTRAVENOUS at 00:26

## 2021-07-02 RX ADMIN — HYDROXYZINE HYDROCHLORIDE 50 MG: 50 TABLET, FILM COATED ORAL at 01:11

## 2021-07-02 RX ADMIN — NALBUPHINE HYDROCHLORIDE 2.5 MG: 10 INJECTION, SOLUTION INTRAMUSCULAR; INTRAVENOUS; SUBCUTANEOUS at 04:33

## 2021-07-02 RX ADMIN — ACETAMINOPHEN 975 MG: 325 TABLET, FILM COATED ORAL at 01:06

## 2021-07-02 RX ADMIN — FLUOXETINE HYDROCHLORIDE 30 MG: 20 CAPSULE ORAL at 07:48

## 2021-07-02 RX ADMIN — ACETAMINOPHEN 975 MG: 325 TABLET, FILM COATED ORAL at 07:47

## 2021-07-02 NOTE — PROGRESS NOTES
OB Postpartum Progress Note    S: Feeling well overall, but very tired this morning. Pain well controlled, had some itching last night that was bothersome but just got some Benadryl. Lochia improving. Tolerating PO without nausea or emesis. No headache, vision changes, CP, SOB, or RUQ pain. Passing flatus, no BM. Ambulating without dizziness or lightheadedness. Rojas still in place, would like to leave in for a few hours to get some rest.    O:  Patient Vitals for the past 24 hrs:   BP Temp Temp src Pulse Resp SpO2 Oximeter Heart Rate   07/02/21 0747 94/56 98.3  F (36.8  C) Oral -- 16 100 % --   07/02/21 0600 101/60 -- -- -- 18 100 % 66 bpm   07/02/21 0517 106/68 -- -- -- 16 100 % 73 bpm   07/02/21 0400 109/74 98.6  F (37  C) Oral -- 16 100 % 65 bpm   07/02/21 0245 99/63 -- -- -- 16 100 % 64 bpm   07/02/21 0215 100/63 99  F (37.2  C) Oral -- 18 97 % 67 bpm   07/02/21 0100 95/63 98.7  F (37.1  C) Oral 72 -- 93 % 73 bpm   07/02/21 0045 91/62 -- -- 75 -- 95 % 76 bpm   07/02/21 0030 96/59 -- -- 77 -- 93 % 78 bpm   07/02/21 0015 98/61 -- -- 74 -- 94 % 75 bpm   07/02/21 0000 103/71 -- -- 80 -- 93 % 82 bpm   07/01/21 2345 102/56 -- -- 73 -- 94 % 74 bpm   07/01/21 2336 98/56 -- -- 73 -- 94 % 73 bpm   07/01/21 2330 98/56 -- -- 81 -- 95 % --   07/01/21 2300 95/63 98.7  F (37.1  C) Oral 75 -- 98 % 76 bpm   07/01/21 2129 130/64 -- -- -- -- -- 75 bpm   07/01/21 2122 108/56 -- -- -- -- -- --   07/01/21 2118 -- -- -- -- -- 92 % --   07/01/21 2030 120/62 -- -- -- -- 98 % 68 bpm   07/01/21 2011 119/65 98.2  F (36.8  C) Oral -- -- -- --   07/01/21 1955 124/63 -- -- -- -- -- --   07/01/21 1953 -- -- -- -- -- 94 % --   07/01/21 1943 -- -- -- -- -- 96 % --   07/01/21 1939 106/63 -- -- -- -- -- --   07/01/21 1923 106/60 -- -- -- -- 98 % --   07/01/21 1918 104/60 -- -- -- -- 99 % --   07/01/21 1913 110/60 -- -- -- -- 99 % --   07/01/21 1909 108/60 98  F (36.7  C) Oral -- -- -- --   07/01/21 1903 128/59 97.6  F (36.4  C) Oral -- -- -- --    21 185 109/55 -- -- -- -- -- --   21 185 112/63 -- -- -- -- -- --   21 1850 113/62 -- -- -- -- -- --   21 1848 112/58 -- -- -- -- -- --   21 1846 118/59 -- -- -- -- -- --   21 1842 120/67 -- -- -- -- -- --   21 1840 124/70 -- -- -- -- -- --   21 1838 123/70 -- -- -- -- -- --   21 1538 113/63 98.9  F (37.2  C) Oral 81 19 -- --   21 1153 104/53 -- -- -- 18 -- 71 bpm   21 0952 100/55 99  F (37.2  C) Oral -- 16 -- 74 bpm     Gen: NAD. Alert, oriented. Resting comfortably in bed.  CV: Regular rate and rhythm, no clicks or gallops  Resp: On room air, no increased work of breathing  Abd: Soft, appropriately tender, fundus firm at 1 cm below the umbilicus, appropriately tender  Incision: C/D/I  Ext: Trace lower extremity edema bilaterally     UOP: 1175  Weight: 69.3 kg    Labs:   Hemoglobin   Date Value Ref Range Status   2021 10.8 (L) 11.7 - 15.7 g/dL Final   2021 12.2 11.7 - 15.7 g/dL Final       A/P: Samantha Dudley is a 40 year old  who is POD#1 s/p PLTCS for category II FHT remote from delivery. Pregnancy notable for fetal hemivertebrae, h/o CS for face presentation, AMA, asthma, h/o bulimia, anxiety, depression. Baby currently being cooled in the NICU. Physically, doing well postpartum. VSS and UOP adequate.    # Postpartum/Postop  - Pain: Continue scheduled Tylenol, ibuprofen, and prn oxycodone  - Heme: Hgb 12.2 >  > 10.8. Continue to monitor vitals. Repeat Hgb as clinically indicated.  - GI: Scheduled senna BID, simethicone TID. Prn miralax, suppository, anti-emetics  - : Rojas in place due to second stage when CS called, to be removed later this morning      - PNC: Rh pos, Rubella immune  - Contraception: Will discuss  - PPx: Encourage ambulation, IS, SCDs while confined to bed      Anticipate discharge POD#2-3; once meeting postpartum discharge goals       LYN Toth     I was present with the medical student  who participated in the service and in the documentation of this note.  I have verified the history and personally performed the physical exam and medical decision making, and have verified the content of the note, which accurately reflect my assessment of the patient and the plan of care.       Shagufta Gtz MD  OBGYN PGY-2  8:55 AM July 2, 2021       Appreciate Dr. Gtz and student doctor Garret's note above, patient also seen and examined by me. I agree with the note above.   Anca Aguilar MD

## 2021-07-02 NOTE — ANESTHESIA PROCEDURE NOTES
Epidural catheter Procedure Note  Pre-Procedure   Staff -        Anesthesiologist:  Deja Keyes MD       Performed By: anesthesiologist       Location: OB       Procedure Start/Stop Times: 7/1/2021 6:25 PM and 7/1/2021 6:40 PM       Pre-Anesthestic Checklist: patient identified, IV checked, risks and benefits discussed, informed consent, monitors and equipment checked, pre-op evaluation, at physician/surgeon's request and post-op pain management  Timeout:       Correct Patient: Yes        Correct Procedure: Yes        Correct Site: Yes        Correct Position: Yes   Procedure Documentation  Procedure: epidural catheter       Patient Position: sitting       Patient Prep/Sterile Barriers: sterile gloves, mask, patient draped       Skin prep: Chloraprep       Local skin infiltrated with 3 mL of 1% lidocaine.        Insertion Site: L3-4. (midline approach).       Technique: LORT saline        DEON at 4 cm.       Needle Type: Touhy needle       Needle Gauge: 17.        Needle Length (Inches): 3.5        Catheter: 19 G.         Catheter threaded easily.         5 cm epidural space.         Threaded 9 cm at skin.         # of attempts: 1 and  # of redirects:  0    Assessment/Narrative         Paresthesias: No.       Test dose of 3 mL lidocaine 1.5% w/ 1:200,000 epinephrine at 18:33.         Test dose negative, 3 minutes after injection, for signs of intravascular, subdural, or intrathecal injection.       Insertion/Infusion Method: LORT saline       Aspiration negative for Heme or CSF via Epidural Catheter.       Sensory Level Left: T10.       Sensory Level Right: T10.    Medication(s) Administered   0.125% Bupivacaine + 2 mcg/mL Fentanyl via CADD (Epidural), 8 mL  Medication Administration Time: 7/1/2021 6:35 PM

## 2021-07-02 NOTE — BRIEF OP NOTE
[facility]   BRIEF OPERATIVE NOTE:  SECTION    Surgery Date:  2021   Surgeon(s): Rina Mcdonald MD  Assistants:  Shagufta Gtz MD, PGY-2; Kaleb Combs MS3    Preoperative Diagnoses:  -  at 40w1d   - History of  section x 1  - Category II FHT remote from delivery  - LOT fetal position  - H/o asthma  - H/o anxiety/depression    Postoperative diagnoses:  -  at 40w1d, now delivered  - Same as above  - Failed TOLAC    Procedure performed:  Repeat low segment transverse  section via Pfannenstiel skin incision with double layer uterine closure    Anesthesia:  Epidural with duramorph  QBL (mL): 518 mL  Fluid replacement: 600 mL crystalloid.   Specimens: Placenta, cord gases  Complications: None apparent       Operative findings:   - Single, liveborn female infant at 0952 hours on 2021. Apgars of 3, 5, 6, and 6 at one, five, ten, and fifteen minutes. Birth weight: pending.  Fetal presentation: Vertex, LOT. Amniotic fluid: Thick meconium .    - Arterial Cord pH 7.06 with base deficit 12.0; Venous Cord pH 7.18 with base deficit 9.4.    - Placenta intact with 3 vessel cord.     - Normal appearing uterus, fallopian tubes, atrophic appearing ovaries.   - Moderately dense recto-fascial adhesions. Mild intraabdominal adhesions.  - Good uterine tone at the end of the case.       Transferred to PACU in stable condition.     Shagufta Gtz MD  OB/GYN Resident PGY-2  11:00 PM 2021

## 2021-07-02 NOTE — PROGRESS NOTES
Labor progress note    S:  Patient had requested cervical exam around , more comfortable with epidural, but wanted to know if she has made any progress.  SVE at  performed and noted to be complete and plus one station.  Discussed beginning active pushing efforts vs. Laboring down. Patient desired to labor down.      O:  Blood pressure 106/60, pulse 81, temperature 98.9  F (37.2  C), temperature source Oral, resp. rate 19, not currently breastfeeding.  General appearance: comfortable.  Contractions: Every 1-3 minutes. 60-80 seconds duration.  Palpate: strong.  FHT: Baseline 145 with moderate variability. Accelerations are not present. Variable decelerations present.  Deep variable decelerations noted at , encouraged patient to begin active pushing efforts.  Position changes completed and IV fluid bolus initiated for intrauterine resuscitation efforts.  Dr. SHAE Mcdonald called to the room due to variables.  ROM: large amount of meconium stained fluid. Membranes have been ruptured for 3 hours.  Pelvic exam: complete, OT and asynclitic with ear palpated under pubic bone.  Dr. Mcdonald obtained verbal consent and attempted manual rotation without success.  Side lying release performed on right side, when turned to the left FHT's noted to have variable deceleration.  Patient re-positioned, no change in fetal position.  Dr. Mcdonald recommended proceeding with  section.    Pitocin- 19 mu/min.,  Antibiotics- none      A:  IUP @ 40w1d second stage labor   Fetal Heart rate tracing Category two  GBS- negative  Patient Active Problem List   Diagnosis     Dysphonia     Family history of breast cancer in female -- maternal aunt     Family history of ovarian cancer -- Mother     Abdominal pain     Mild intermittent asthma     Cervical high risk HPV (human papillomavirus) test positive     S/P  section     Anxiety     Bulimia nervosa     COVID-19     Depressive disorder     High-risk pregnancy, elderly  multigravida, unspecified trimester     At risk for ineffective breastfeeding     Known fetal anomaly, antepartum     Conceived by in vitro fertilization     Labor and delivery indication for care or intervention         P:  Patient gave verbal consent to proceed with  section.   Cares transferred to OB team.    ANDREY MarteM

## 2021-07-02 NOTE — ANESTHESIA CARE TRANSFER NOTE
Patient: Samantha Dudley    Procedure(s):   SECTION    Diagnosis: * No pre-op diagnosis entered *  Diagnosis Additional Information: No value filed.    Anesthesia Type:   Epidural     Note:    Oropharynx: spontaneously breathing  Level of Consciousness: awake  Oxygen Supplementation: room air    Independent Airway: airway patency satisfactory and stable  Dentition: dentition unchanged  Vital Signs Stable: post-procedure vital signs reviewed and stable  Report to RN Given: handoff report given  Patient transferred to: PACU  Comments: T 98.7  Handoff Report: Identifed the Patient, Identified the Reponsible Provider, Reviewed the pertinent medical history, Discussed the surgical course, Reviewed Intra-OP anesthesia mangement and issues during anesthesia, Set expectations for post-procedure period and Allowed opportunity for questions and acknowledgement of understanding      Vitals: (Last set prior to Anesthesia Care Transfer)  CRNA VITALS  2021 2225 - 2021 2255      2021             Resp Rate (observed):  (!) 1        Electronically Signed By: ANDREY Gonzales CRNA  2021  10:55 PM

## 2021-07-02 NOTE — PROGRESS NOTES
Labor progress note    S:  Patient requested and received a labor epidural.  Feeling some relief following epidural placement, feeling some pressure and discomfort over pubic bone, bladder feels full.    O:  Blood pressure 106/60, pulse 81, temperature 98.9  F (37.2  C), temperature source Oral, resp. rate 19, not currently breastfeeding.  General appearance: comfortable.  Contractions: Every 1-3 minutes. 70-80 seconds duration.  Palpate: strong.  FHT: Baseline 130 with moderate variability. Accelerations are present. No decelerations present.  ROM: large amount of meconium stained fluid noted. Membranes have been ruptured for 1.5 hours.  Pelvic exam: deferred  Pitocin- 19 mu/min.,  Antibiotics- none      A:  IUP @ 40w1d IOL for mild polyhydramnios and late term   Fetal Heart rate tracing Category category one  GBS- negative  Patient Active Problem List   Diagnosis     Dysphonia     Family history of breast cancer in female -- maternal aunt     Family history of ovarian cancer -- Mother     Abdominal pain     Mild intermittent asthma     Cervical high risk HPV (human papillomavirus) test positive     S/P  section     Anxiety     Bulimia nervosa     COVID-19     Depressive disorder     High-risk pregnancy, elderly multigravida, unspecified trimester     At risk for ineffective breastfeeding     Known fetal anomaly, antepartum     Conceived by in vitro fertilization     Labor and delivery indication for care or intervention         P:  Anticipate   reevaluate in 2-4 hours/PRN   Patient to sit on bed pain to attempt voiding, if unable consider straight catheterization.  ANDREY Marte CNM

## 2021-07-02 NOTE — PROGRESS NOTES
Asked to evaluate patient for Cat 2 FHR tracing.     Patient is a 40 year old  @40w1d admitted for IOL for fetal indications. Has h/o primary CS for face presentation. Induction with newman, followed by pitocin and AROM. Progressed to complete dilation and began having deep variable decels with pushing. On cervical exam noted to have OT presentation, asynclytic. IVF bolus given. Attempt at rotation made without success. Position changes employed with no movement. Due to ongoing FHR recommend repeat CS for Cat 2 remote from delivery. Consent obtained.     FHT: baseline 160, minimal variability, recurrent late decelerations, cat 2.     TOCO: Ctx Q2-3 min    Anesthesia and charge nurse updated, urgent CS called.     Rina Mcdonald MD

## 2021-07-02 NOTE — PLAN OF CARE
Labor progression to complete. Epidural received for pain management with adequate relief. Large amount of meconium stained fluid with contractions. Patient repositioned in various positions. Baseline change and decelerations noted on fetal strip. IV fluid bolus initiated and pitocin was turned off. Continue to reposition patient. Consent signed for operative  section. NICU team notified of fetal status. Patient delivered female baby via  section at 2152.  and  present at bedside.

## 2021-07-02 NOTE — DISCHARGE SUMMARY
[facility]   Discharge Summary    Samantha Dudley MRN# [id number]   Age: 40 year old YOB: 1980     Date of Admission:  2021  Date of Discharge::  2021  Admitting Physician:  Tejal Murillo CNM  Discharge Physician:  Anca Aguilar MD             Admission Diagnoses:   Intrauterine pregnancy at 40w1d  Fetal hemivertebrae  IVF pregnancy  H/o prior CS for face presentation  AMA  Asthma  H/o bulimia, anxiety, and depression          Discharge Diagnosis:     Same, delivered   Category II FHT remote from delivery  Fetal position LOT          Procedures:     Procedure(s): Repeat low transverse  section with two layer closure via Pfannenstiel skin incision  Epidural anesthesia                Medications Prior to Admission:     No medications prior to admission.             Discharge Medications:        Review of your medicines      START taking      Dose / Directions   acetaminophen 325 MG tablet  Commonly known as: TYLENOL  Used for: S/P  section      Dose: 975 mg  Take 3 tablets (975 mg) by mouth every 6 hours  Quantity: 100 tablet  Refills: 1     ibuprofen 600 MG tablet  Commonly known as: ADVIL/MOTRIN  Used for: S/P  section      Dose: 600 mg  Take 1 tablet (600 mg) by mouth every 6 hours  Quantity: 30 tablet  Refills: 1     oxyCODONE 5 MG tablet  Commonly known as: ROXICODONE  Used for: S/P  section      Dose: 5 mg  Take 1 tablet (5 mg) by mouth every 4 hours as needed for severe pain  Quantity: 8 tablet  Refills: 0     polyethylene glycol 17 GM/Dose powder  Commonly known as: MIRALAX  Used for: S/P  section      Dose: 17 g  Take 17 g by mouth daily  Quantity: 510 g  Refills: 1     senna-docusate 8.6-50 MG tablet  Commonly known as: SENOKOT-S/PERICOLACE  Used for: S/P  section      Dose: 2 tablet  Take 2 tablets by mouth 2 times daily  Quantity: 120 tablet  Refills: 1     simethicone 80 MG chewable  tablet  Commonly known as: MYLICON  Used for: S/P  section      Dose: 80 mg  Take 1 tablet (80 mg) by mouth 4 times daily as needed for other (gas)  Quantity: 30 tablet  Refills: 1        CONTINUE these medicines which have NOT CHANGED      Dose / Directions   albuterol 108 (90 Base) MCG/ACT inhaler  Commonly known as: PROAIR HFA/PROVENTIL HFA/VENTOLIN HFA      albuterol sulfate HFA 90 mcg/actuation aerosol inhaler  Refills: 0     doxylamine 25 MG Tabs tablet  Commonly known as: UNISOM      Dose: 25 mg  Take 25 mg by mouth At Bedtime  Refills: 0     FISH OIL PO      Take by mouth daily  Refills: 0     magnesium 250 MG tablet      Dose: 1 tablet  Take 1 tablet by mouth daily  Refills: 0     mometasone 220 MCG/INH inhaler  Commonly known as: ASMANEX TWISTHALER      Inhale into the lungs every evening  Refills: 0     Multi-vitamin tablet      Dose: 1 tablet  Take 1 tablet by mouth daily  Refills: 0     prenatal multivitamin w/iron 27-0.8 MG tablet      Dose: 1 tablet  Take 1 tablet by mouth daily  Refills: 0     PROzac 10 MG capsule  Generic drug: FLUoxetine      Dose: 30 mg  Take 30 mg by mouth daily.  Refills: 0        STOP taking    aspirin 162.5 mg tablet        Stool Softener 100 MG capsule  Generic drug: docusate sodium              Where to get your medicines      These medications were sent to [facility]/pharmacy #[id number] [address] [address]    Phone: [phone]     acetaminophen 325 MG tablet    ibuprofen 600 MG tablet    oxyCODONE 5 MG tablet    polyethylene glycol 17 GM/Dose powder    senna-docusate 8.6-50 MG tablet    simethicone 80 MG chewable tablet               Consultations:   Anesthesia           Brief Admission History:   Samantha Dudley is a 40 year old now P2 who initially presented at 40w1d with following recommendation from [facility] for IOL. TOLAC. Indications were fetal hemivertebrae. Pregnancy has been complicated  by IVF pregnancy - donor egg, AMA, asthma- seeing pulmonology, heart palpitations- see by cardiology, hx of bulimia, anxiety, depression.        Labor & Intraoperative course   Samantha Dudley is a 40 year old, , who was admitted at 40w0d by embryo transfer date for IOL for fetal anomalies including hemivertebrae and possible tracheoesophageal fistula. Pregnancy was complicated by IVF pregnancy, h/o prior CS for face presentation, AMA, asthma, heart palpitations, and h/o bulimia, anxiety, and depression. She was initially admitted with the [facility] service for induction. She received a newman balloon for cervical ripening and pitocin and AROM for augmentation. She progressed to complete and began pushing, but unfortunately made minimal progress with a category II FHT. Fetal position was noted to be direct OT. Manual rotation and maternal position changes attempted without improvement. Due to persistent category II FHT recommendation was made for repeat  delivery. The risks, benefits, and alternatives of  delivery were explained and the patient agreed to proceed.     The procedure was uncomplicated.   mL.  See operative report for details.     Operative findings:   - Single, liveborn female infant at 0952 hours on 2021. Apgars of 3, 5, 6, and 6 at one, five, ten, and fifteen minutes. Birth weight: pending.  Fetal presentation: Vertex, LOT. Amniotic fluid: Thick meconium .    - Arterial Cord pH 7.06 with base deficit 12.0; Venous Cord pH 7.18 with base deficit 9.4.    - Placenta intact with 3 vessel cord.     - Normal appearing uterus, fallopian tubes, atrophic appearing ovaries.   - Moderately dense recto-fascial adhesions. Minimal intraabdominal adhesions.  - Good uterine tone at the end of the case.       Postpartum Course   The patient's hospital course was unremarkable.  She recovered as anticipated and experienced no post-operative complications. On discharge, her pain was well  controlled. Vaginal bleeding is similar to peak menstrual flow.  Voiding without difficulty.  Ambulating well, tolerating a normal diet, and has resumption of bowel function.  No fever or significant wound drainage. Pumping for baby in NICU.  Infant is in the NICU. She was discharged on post-partum day #3.    Post-partum hemoglobin:   Hemoglobin   Date Value Ref Range Status   07/02/2021 10.8 (L) 11.7 - 15.7 g/dL Final     Contraception: discuss at postpartum visit  Rh positive, no Rhogam indicated  Rubella immune, no MMR indicated          Discharge Instructions and Follow-Up:     Discharge diet: Regular   Discharge activity: No lifting greater than 20 lbs, pushing, pulling, or other strenuous activity for 6 weeks. Pelvic rest for 6 weeks including no sexual intercourse, tampons, or douching. No driving until you can slam on the brakes without pain or while on narcotic pain medications.    Discharge follow-up: Follow up with primary OB for incision and mood check in 1-2 weeks and routine postpartum visit in 6 weeks   Wound care: Keep incision clean and dry           Discharge Disposition:     Discharged to home       Eunice Coronado MD  OB/GYN Resident, PGY-1    I saw and evaluated this patient prior to discharge. I discussed the patient with the resident and agree with plan of care as documented in the resident note.   I personally reviewed vital signs, medications, labs.   I personally spent 10 minutes on discharge activities.  Anca Aguilar MD

## 2021-07-02 NOTE — ANESTHESIA PREPROCEDURE EVALUATION
"Anesthesia Pre-Procedure Evaluation    Patient: Samantha Dudley   MRN: [id number] : 1980        Preoperative Diagnosis: * No surgery found *   Procedure :      Past Medical History:   Diagnosis Date     Asthma      Brachial neuritis 2011    resolved with P     Cervical dysplasia 2010    treated in [place], normal f/u paps     Eating disorder     previously on Prozac     Generalized anxiety disorder 2013    Problem resolved at pt request  that it be removed from problem list.      Hoarseness      Menarche age 14    cycles q mo x 7d, heavy, w cramps     Nasal congestion      Neck pain 2011     Oral contraceptive use age 18    for cycle control     Sore throat      Trouble breathing     At night      Past Surgical History:   Procedure Laterality Date     CONIZATION LEEP      \"most painful thing I've ever had\"     perianal lesion excision  child    scar on R @ 2:00 -- benign lesion      [place] teeth extraction  18      Allergies   Allergen Reactions     Other [No Clinical Screening - See Comments] Anaphylaxis     Triamenic cough syrup.  Per patients father when she was 5 y.o.     Cat Hair Extract Itching      Social History     Tobacco Use     Smoking status: Never Smoker     Smokeless tobacco: Never Used   Substance Use Topics     Alcohol use: Yes     Comment: 2-3 drinks 1x/wk max      Wt Readings from Last 1 Encounters:   21 69.3 kg (152 lb 11.2 oz)        Anesthesia Evaluation   Pt has had prior anesthetic. Type: Regional.        ROS/MED HX  ENT/Pulmonary:     (+) Intermittent, asthma     Neurologic:       Cardiovascular:  - neg cardiovascular ROS     METS/Exercise Tolerance:     Hematologic:  - neg hematologic  ROS     Musculoskeletal:       GI/Hepatic:  - neg GI/hepatic ROS     Renal/Genitourinary:       Endo:  - neg endo ROS     Psychiatric/Substance Use:     (+) psychiatric history (eating disorder) anxiety and depression     Infectious Disease:       Malignancy:    "    Other:      (+) , previous , TOLAC candidate,        Physical Exam    Airway        Mallampati: II   TM distance: > 3 FB   Neck ROM: full   Mouth opening: > 3 cm    Respiratory Devices and Support         Dental  no notable dental history         Cardiovascular   cardiovascular exam normal          Pulmonary   pulmonary exam normal                OUTSIDE LABS:  CBC:   CBC RESULTS:   Recent Labs   Lab Test 21  1548 21  1601   WBC 9.3  --   --  7.5   HGB 12.2  --  13.1 12.9   HCT 35.8 36.1  --  37.9     --   --  243     Last Comprehensive Metabolic Panel:  Recent Labs   Lab Test 14  1557   .0   POTASSIUM 4.6   CHLORIDE 101.0   CO2 28.0   BUN 11.0   CR 0.8   GLC 95.0   KARYN 9.0        Lab Results   Component Value Date    WBC 9.3 2021    WBC 7.5 2014    HGB 12.2 2021    HGB 13.1 2020    HCT 35.8 2021    HCT 36.1 2021     2021     2014     BMP:   Lab Results   Component Value Date    .0 2014    .0 2012    POTASSIUM 4.6 2014    POTASSIUM 3.9 2012    CHLORIDE 101.0 2014    CHLORIDE 97.0 2012    CO2 28.0 2014    CO2 29.0 2012    BUN 11.0 2014    BUN 13.0 2012    CR 0.8 2014    CR 0.7 2012    GLC 95.0 2014    .0 2012     COAGS: No results found for: PTT, INR, FIBR  POC:   Lab Results   Component Value Date    HCG NEGATIVE 2015     HEPATIC: No results found for: ALBUMIN, PROTTOTAL, ALT, AST, GGT, ALKPHOS, BILITOTAL, BILIDIRECT, SHAILESH  OTHER:   Lab Results   Component Value Date    KARYN 9.0 2014    TSH 1.49 2012    CRP <5.0 2014       Anesthesia Plan    ASA Status:  2, emergent       Anesthesia Type: Epidural.              Consents    Anesthesia Plan(s) and associated risks, benefits, and realistic alternatives discussed. Questions answered and patient/representative(s) expressed  understanding.     - Discussed with:  Patient         Postoperative Care            Comments:    Called for Urgent c section last epidural took a while to work. Started bolusing in room with 2% lido with epi 1:[id number] + 1 meQ/cc HCO3 T4 level in dami. Maintained constant presence with patient with frequent BP check 1 BP 90/50s tx with phenylephrine  wihich improved to 130/80 FHT stable.       neg OB ROS.       Deja Keyes MD

## 2021-07-02 NOTE — PROGRESS NOTES
Delayed entry due to patient care    Data: Samantha Dudley transferred to 71 via cart at 0200. Baby girl admitted to NICU. Parents accompanied by RN to visit  in NICU prior to arriving at [facility].   Action: Receiving unit notified of transfer: Yes. Patient and family notified of room change. Belongings sent to receiving unit. Accompanied by Registered Nurse. Oriented patient to surroundings. Call light within reach.   Response: Patient tolerated transfer and is stable.

## 2021-07-02 NOTE — PROVIDER NOTIFICATION
07/02/21 0600   Provider Notification   Provider Name/Title MD Tresa   Method of Notification At Bedside   Notification Reason Status Update     Dr. Gtz and Kaleb, Medical Student at bedside for AM rounds.

## 2021-07-02 NOTE — PROGRESS NOTES
This writer attempted to meet with parents x2 to complete psychosocial assessment.  Parents were asleep both times and requested to not be disturbed    Page on-call SW with any urgent needs [phone].  This writer is in Monday at 830am    Susan WORTHINGTON, MSW, [facility]  Maternal Child Health

## 2021-07-02 NOTE — PROGRESS NOTES
I spoke with Dr. Leticia Lind who talked with L&D regarding failure to obtain cordblood specimen after Samantha's delivery. Dr. Lind stated that it was attempted but unsuccessful. Dr. Lind said that she would speak with the resident and/or provider on L&D to insure that Samantha was informed of this prior to discharge. I offered that peripheral blood sample could be drawn from the baby if Samantha chooses for microarray and DNA banking. The alternative is that a new testing plan be determined at the outpatient pediatric genetics appointment scheduled for 7/29/21. I told Dr. Lind that Samantha can call me if she has further questions.    Jennifer Anaya MS, [facility]  Genetic Counselor  Maternal Fetal Medicine  [facility]   Phone: [phone]  Pager: [phone]  Email: [email or web address]

## 2021-07-02 NOTE — PLAN OF CARE
POD 1 following repeat c/s. VSS, afebrile. Last hourly fundal check at 0700. Pt quite anxious and particular with cares. Rojas in place until this afternoon. Adequate urine output, still appears quite concentrated. Pt requesting continuous IVF overnight. She has been drinking with encouragement, ate a few small crackers. No N/V. Pain well managed with scheduled tylenol and toradol. Fundus U/U-U/1, midline and firm w/ scant to light bleeding. Pt able to sit up and stand up at bedside with a few marches in place. She reported some lightheadedness initially, but moving well. Experiencing some itching, nubain given with no relief. Pt given PO benadryl to try. She is concerned about her lack of sleep, it is triggering for her anxiety. Pt appreciative of care clustering and rest promotion.  remains in NICU, desires to pump, but has refused overnight. States she would be agreeable to try during the day this morning. Continue with present cares.

## 2021-07-02 NOTE — ANESTHESIA PROCEDURE NOTES
TAP Procedure Note  Pre-Procedure   Staff -        Anesthesiologist:  Deja Keyes MD       Performed By: anesthesiologist       Location: OB       Procedure Start/Stop Times: 7/1/2021 10:40 PM and 7/1/2021 10:50 PM       Pre-Anesthestic Checklist: patient identified, IV checked, site marked, risks and benefits discussed, informed consent, monitors and equipment checked, pre-op evaluation, at physician/surgeon's request and post-op pain management  Timeout:       Correct Patient: Yes        Correct Procedure: Yes        Correct Site: Yes        Correct Position: Yes        Correct Laterality: Yes        Site Marked: Yes  Procedure Documentation  Procedure: TAP       Laterality: bilateral       Patient Position: supine       Patient Prep/Sterile Barriers: sterile gloves, mask       Skin prep: Chloraprep       Needle Type: short bevel       Needle Gauge: 21.        Needle Length (millimeters): 110        1. Ultrasound was used to identify targeted nerve, plexus, vascular marker, or fascial plane and place a needle adjacent to it in real-time.       2. Ultrasound was used to visualize the spread of anesthetic in close proximity to the above referenced structure.       3. A permanent image is entered into the patient's record.    Assessment/Narrative         The placement was negative for: blood aspirated, painful injection and site bleeding       Paresthesias: No.     Bolus given via needle..        Secured via.        Insertion/Infusion Method: Single Shot       Complications: none       Sensory Level Left: T9.       Sensory Level Right: T9.    Medication(s) Administered   Bupivacaine 0.25% PF (Infiltration), 20 mL  Bupivacaine liposome (Exparel) 1.3% LA inj susp (Infiltration), 20 mL  Medication Administration Time: 7/1/2021 10:50 PM

## 2021-07-02 NOTE — PROGRESS NOTES
"Samantha Dudley      MRN#: [id number]  Age: 40 year old      YOB: 1980      CNM Social Rounds    Attempted to perform social rounds - noted lights off, \"family at rest\" sign on door. Will attempt at later time.  LONDON ParkinsonM      "

## 2021-07-02 NOTE — OP NOTE
[facility]   OPERATIVE NOTE:  SECTION    Surgery Date:  2021   Surgeon(s): Rina Mcdonald MD  Assistants:  Shagufta Gtz MD, PGY-2; Kaleb Combs MS3     Preoperative Diagnoses:  -  at 40w1d   - History of  section x 1  - Category II FHT remote from delivery  - LOT fetal position  - H/o asthma  - H/o anxiety/depression     Postoperative diagnoses:  -  at 40w1d, now delivered  - Same as above  - Failed TOLAC     Procedure performed:  Repeat low segment transverse  section via Pfannenstiel skin incision with double layer uterine closure     Anesthesia:  Epidural with duramorph  QBL (mL): 518 mL  Fluid replacement: 600 mL crystalloid.   Specimens: Placenta, cord gases  Complications: None apparent        Operative findings:   - Single, liveborn female infant at 0952 hours on 2021. Apgars of 3, 5, 6, and 6 at one, five, ten, and fifteen minutes. Birth weight: pending.  Fetal presentation: Vertex, LOT. Amniotic fluid: Thick meconium .    - Arterial Cord pH 7.06 with base deficit 12.0; Venous Cord pH 7.18 with base deficit 9.4.    - Placenta intact with 3 vessel cord.     - Normal appearing uterus, fallopian tubes, atrophic appearing ovaries.   - Moderately dense recto-fascial adhesions. Minimal intraabdominal adhesions.  - Good uterine tone at the end of the case.     Indication: Samantha Dudley is a 40 year old, , who was admitted at 40w0d by embryo transfer date for IOL for fetal anomalies including hemivertebrae and possible tracheoesophageal fistula. Pregnancy was complicated by IVF pregnancy, h/o prior CS for face presentation, AMA, asthma, heart palpitations, and h/o bulimia, anxiety, and depression. She was initially admitted with the [facility] service for induction. She received a newman balloon for cervical ripening and pitocin and AROM for augmentation. She progressed to complete and began pushing, but unfortunately made  minimal progress with a category II FHT. Position changes and IVF given. Fetal position was noted to be direct OT. Manual rotation and maternal position changes attempted without improvement. Due to persistent category II FHT recommendation was made for repeat  delivery. The risks, benefits, and alternatives of  delivery were explained and the patient agreed to proceed.     Procedure details:  After obtaining informed consent, the patient was taken to the operating room. She received a bolus of her epidural, Ancef, and azithromycin prior to the skin incision. She was placed in the dorsal supine position with a leftward tilt and prepped and draped in the usual sterile fashion.     Following test of adequate spinal anesthesia, the abdomen was entered through a transverse skin incision through her previous scar. The skin incision was made sharply and carried through the subcutaneous tissue to the fascia.  Fascia was incised in the midline and extended laterally with the Silva scissors. The superior margin of the fascial incision was grasped with Kocher clamps and dissected from the underlying muscle sharp and blunt dissecton, which was then repeated at the lower margin of the fascial incision. The muscle was  in the midline. The peritoneum was entered bluntly and the opening extended by blunt and sharp dissection with care to avoid the bladder. A bladder blade was placed. The lower segment of the uterus was opened sharply in a transverse fashion and extended with digital pressure. The infant was noted to be LOT. The head was elevated to the level of the hysterotomy and was delivered atraumatically. The cord was doubly clamped and cut and the infant was handed off to the waiting NICU staff. A segment of the cord was cut and set aside for cord gases.    The placenta was manually removed after failure to deliver with expression. The uterus was exteriorized from the abdomen and cleared of all clots  and debris. The uterus was massaged and was noted to be boggy. Oxytocin was given through the running IV. The hysterotomy was repaired with 0-vicryl suture in a running locked fashion. A second layer of 0-monocryl was used to imbricate the incision and good hemostasis was achieved. The bladder flap was inspected and found to be hemostatic. With vigorous massage as well as administration of oxytocin, good uterine tone was achieved. The posterior cul-de-sac was cleared of clot and debris and the uterus was returned to the abdomen. The bilateral pericolic gutters were cleared of clot and debris. The hysterotomy was again inspected and found to be hemostatic. The abdominal wall was examined and also found to be hemostatic. The fascia was closed with a running suture of 0-Vicryl. Subcutaneous tissue was irrigated. Areas that were not hemostatic were controlled with cautery. The subcutaneous tissue was less than 3cm in depth and did not require reapproximation. The skin was closed with 4-0 Monocryl. The patient tolerated the procedure well and was taken to the recovery room in stable condition. All sponge, needle and instrument counts were correct x2. Dr. Rina Mcdonald was present for the entire procedure.       Shagufta Gtz MD  OB/GYN Resident PGY-2  2:31 AM July 2, 2021    I was present and scrubbed for entire procedure.   Rina Mcdonald MD

## 2021-07-03 PROCEDURE — 250N000013 HC RX MED GY IP 250 OP 250 PS 637: Performed by: MIDWIFE

## 2021-07-03 PROCEDURE — 120N000002 HC R&B MED SURG/OB UMMC

## 2021-07-03 PROCEDURE — 250N000013 HC RX MED GY IP 250 OP 250 PS 637

## 2021-07-03 PROCEDURE — 250N000013 HC RX MED GY IP 250 OP 250 PS 637: Performed by: STUDENT IN AN ORGANIZED HEALTH CARE EDUCATION/TRAINING PROGRAM

## 2021-07-03 RX ORDER — POLYETHYLENE GLYCOL 3350 17 G/17G
17 POWDER, FOR SOLUTION ORAL PRN
Status: DISCONTINUED | OUTPATIENT
Start: 2021-07-03 | End: 2021-07-04 | Stop reason: HOSPADM

## 2021-07-03 RX ADMIN — DOCUSATE SODIUM 100 MG: 100 CAPSULE, LIQUID FILLED ORAL at 09:08

## 2021-07-03 RX ADMIN — IBUPROFEN 800 MG: 800 TABLET, FILM COATED ORAL at 17:59

## 2021-07-03 RX ADMIN — DOCUSATE SODIUM 100 MG: 100 CAPSULE, LIQUID FILLED ORAL at 20:23

## 2021-07-03 RX ADMIN — FLUOXETINE HYDROCHLORIDE 30 MG: 20 CAPSULE ORAL at 09:08

## 2021-07-03 RX ADMIN — ACETAMINOPHEN 975 MG: 325 TABLET, FILM COATED ORAL at 23:02

## 2021-07-03 RX ADMIN — SIMETHICONE CHEW TAB 80 MG 80 MG: 80 TABLET ORAL at 17:59

## 2021-07-03 RX ADMIN — IBUPROFEN 800 MG: 800 TABLET, FILM COATED ORAL at 10:46

## 2021-07-03 RX ADMIN — ACETAMINOPHEN 975 MG: 325 TABLET, FILM COATED ORAL at 17:59

## 2021-07-03 RX ADMIN — SIMETHICONE CHEW TAB 80 MG 80 MG: 80 TABLET ORAL at 04:44

## 2021-07-03 RX ADMIN — POLYETHYLENE GLYCOL 3350 17 G: 17 POWDER, FOR SOLUTION ORAL at 15:18

## 2021-07-03 RX ADMIN — PRENATAL VITAMINS-IRON FUMARATE 27 MG IRON-FOLIC ACID 0.8 MG TABLET 1 TABLET: at 09:08

## 2021-07-03 RX ADMIN — SIMETHICONE CHEW TAB 80 MG 80 MG: 80 TABLET ORAL at 10:46

## 2021-07-03 RX ADMIN — ACETAMINOPHEN 975 MG: 325 TABLET, FILM COATED ORAL at 10:45

## 2021-07-03 RX ADMIN — Medication 1 G: at 09:07

## 2021-07-03 RX ADMIN — SIMETHICONE CHEW TAB 80 MG 80 MG: 80 TABLET ORAL at 23:02

## 2021-07-03 RX ADMIN — HYDROXYZINE HYDROCHLORIDE 50 MG: 50 TABLET, FILM COATED ORAL at 23:02

## 2021-07-03 RX ADMIN — IBUPROFEN 800 MG: 800 TABLET, FILM COATED ORAL at 23:02

## 2021-07-03 RX ADMIN — IBUPROFEN 800 MG: 800 TABLET, FILM COATED ORAL at 04:40

## 2021-07-03 RX ADMIN — DOCUSATE SODIUM AND SENNOSIDES 2 TABLET: 8.6; 5 TABLET ORAL at 09:08

## 2021-07-03 RX ADMIN — ACETAMINOPHEN 975 MG: 325 TABLET, FILM COATED ORAL at 04:36

## 2021-07-03 RX ADMIN — BISACODYL 10 MG: 10 SUPPOSITORY RECTAL at 18:32

## 2021-07-03 NOTE — LACTATION NOTE
"This note was copied from a baby's chart.  D:  I met with Samantha on [facility], Wendy is her second baby. She  her 5 year old for about 7 weeks, expresses she had difficulty with latch, pumped and bottled mostly, and had issues with engorgement.  She shares she has a history of depression and anxiety for which she takes prozac (Hale L2-limited data-probably compatible), and asthma/allergies for which she takes and asmanex (Hale L3-no data-probably compatible) and albuterol (Hale L1-no data-compatible). She has no history of breast/chest surgery or trauma.  She has already started to pump, with some drops expressed including a few drops with blood in it. We discussed possibility of using the colostrum, including if small amount of blood present; she declined. Her [facility] resource nurse also discussed with her and put a sticky note for provider to discuss blood in expressed breastmilk with Samantha. At the start of our discussion, mom expressed frustration with her experience stating \"everything has gone wrong at [facility] so far\" with tears in her eyes. I listened to her share her experience, cry, and I apologized. She expressed desire to meet with social work as soon as possible, resource nurse paged NICU social work. She has been appreciative of updates from the medical team on her care and Wendy's care in the NICU. Samantha shared how exhausted she is, we made a plan for change of shift nurse to round as soon as possible, and I placed note on door regarding mother resting, check with nurse prior to entry. I also shared mom's concerns with [facility] charge nurse.   I:  I gave her a folder of introductory materials and went over pumping guidelines.  I reviewed physiology of colostrum and milk production, pumping guidelines, and I gave her a log and encouraged her to use it.   I explained how to access the videos \"Hand Expression\" and \"Maximizing Milk Production\"; as well as other helpful books and websites.   We discussed hands-on " pumping techniques and usefulness of a hands-free pumping bra.  We discussed skin to skin holding (hand hugs currently) and how to reach your breastfeeding goals.  We talked about  medications during breastfeeding.  She verbalized understanding via teachback. She has a new [place] pump through her insurance, and her Medela Pump in Style from previous lactation experience.   A:  Mom has information she needs to initiate her supply.   P: Will continue to provide lactation support.    SURY Merritt, RN, IBCLC

## 2021-07-03 NOTE — PROGRESS NOTES
OB Postpartum Progress Note    S: Feeling well overall, but very tired this morning. Pain well controlled, had some itching last night that was bothersome but just got some Benadryl  . Lochia improving. Tolerating PO without nausea or emesis. No headache, vision changes, CP, SOB, or RUQ pain. Passing flatus, no BM. Ambulating without dizziness or lightheadedness. Rojas removed yesterday  , would like to leave in for a few hours to get some rest.    O:  Patient Vitals for the past 24 hrs:   BP Temp Temp src Pulse Resp SpO2   21 0900 106/67 98.6  F (37  C) Oral 69 16 --   21 0417 107/59 98.1  F (36.7  C) Oral 62 16 --   21 1939 100/70 98.2  F (36.8  C) Oral 65 16 100 %   21 1543 98/65 98.3  F (36.8  C) Oral 66 16 100 %     Gen: NAD. Alert, oriented. Resting comfortably in bed.  CV: Regular rate and rhythm, no clicks or gallops  Resp: On room air, no increased work of breathing  Abd: Soft, appropriately tender, fundus firm at 1 cm below the umbilicus, appropriately tender  Incision: C/D/I  Ext: Trace lower extremity edema bilaterally     Weight: 69.3 kg    Labs:   Hemoglobin   Date Value Ref Range Status   2021 10.8 (L) 11.7 - 15.7 g/dL Final   2021 12.2 11.7 - 15.7 g/dL Final       A/P: Samantha Dudley is a 40 year old  who is POD#2 s/p PLTCS for category II FHT remote from delivery. Pregnancy notable for fetal hemivertebrae, h/o CS for face presentation, AMA, asthma, h/o bulimia, anxiety, depression. Baby currently being cooled in the NICU. Physically, doing well postpartum. VSS and UOP adequate.    # Postpartum/Postop  - Pain: Continue scheduled Tylenol, ibuprofen, and prn oxycodone  - Heme: Hgb 12.2 >  > 10.8. Continue to monitor vitals. Repeat Hgb as clinically indicated.  - GI: Scheduled senna BID, simethicone TID. Prn miralax, suppository, anti-emetics  - : voiding     - PNC: Rh pos, Rubella immune  - Contraception: Will discuss tomorrow  - PPx: Encourage  ambulation, IS, SCDs while confined to bed      Anticipate discharge POD # 3; once meeting postpartum discharge goals       Eunice Coornado MD  OB/GYN Resident, PGY-1  I saw and evaluated the patient. I agree with the   findings and the plan of care as documented in the resident's note.  MD Elly

## 2021-07-03 NOTE — PLAN OF CARE
VSS. Fundus firm at 2 cm below umbilicus. Lochia scant. Voids adequately. +BS, c/o feeling bloated- Simethicone given, denies nausea. Incisional pain managed with Tylenol and Ibuprofen. Wound dressing c/d/I. Independent with breast pumping and hand expression. Continue poc.

## 2021-07-03 NOTE — PLAN OF CARE
Pt is stable. Vital signs are stable and postpartum checks are normal. Eating and drinking adequate amount. Incision is still covered with dressing and pt was encouraged to take a shower for the dressing to come off easy. Pt is up in the room to void. Pumping and collect with cotton tips. Pt visiting baby in NICU. Complain of sharp pain on the right side of incision and concern about constipation. Medicated pt with Ibuprofen and Tylenol for pain control. Had simethicone for gas discomfort. Offered pt suppository, but want it and the Miralax after visiting baby in NICU. Encouraged pt to shower and to keep pumping every 2-3  Hours. Continue cares.

## 2021-07-03 NOTE — PROGRESS NOTES
[facility]  PEDIATRIC ON-CALL    SOCIAL WORK PROGRESS NOTE      DATA:     On Call SW received request to consult regarding patient Samantha Dudley. Samantha has expressed some frustration with her experience at [facility] and would like to speak with a .     INTERVENTION:     -Completed Chart Review  -Spoke with RN and Charge Nurse  -Spoke with patient via phone.     SW introduced self and explained role. Kaylah expressed that she has been trying to meet with Social Work and feels as though there hasn't been a lot of effort to meet with them. She is interested in resources that may be available if she is discharged prior to baby in the NICU, specifically the possibility of boarding.     SW explained that for a parent to stay in a room, permission has to be received from the Charge Nurse and that permission would depend on the availability of space at the hospital. The SW also discussed the possibility of a hotel stay for one night until the [facility] SW is able to meet with her on Monday morning.     SW spoke with Charge Nurse  - the census is high in the hospital and space is limited. Also the situation may not be enough to get permission to have the family stay on site. SW and Charge agreed to check in tomorrow in the AM to discuss the possibility of a hotel stay.     SW spoke with Samantha Via phone to let her know that staying at the hospital was unlikely, but that we would discuss with the Charge Nurse in the morning whether that or a hotel stay could be arranged. Samantha reported she understands the plan and depending on the cares for Baby, she may be able to stay at home tomorrow. SW agreed to call after speaking with the Charge Nurse.       PLAN:     Follow up on 07/04 regarding hotel stay and discharge plan.

## 2021-07-03 NOTE — PROGRESS NOTES
CLINICAL NUTRITION SERVICES - ASSESSMENT NOTE     Nutrition Prescription    RECOMMENDATIONS FOR MDs/PROVIDERS TO ORDER:  None at this time    Malnutrition Status:    Patient does not meet criteria    Recommendations already ordered by Registered Dietitian (RD):  Continue current diet orders    Future/Additional Recommendations:  None at this time     REASON FOR ASSESSMENT  Samantha Dudley is a/an 40 year old female assessed by the dietitian for MST score of 3 d/t 2 lb weight loss and decreased PO d/t GI issues.    NUTRITION HISTORY  - Information obtained from patient  - Diet history- pt reports following a regular diet at home. No major concerns--she is currently eating well. Decreased PO at the end of pregnancy, feeling full early and some constipation per RN notes, stress.  - NKFA    CURRENT NUTRITION ORDERS  Diet: Regular  Intake/Tolerance: good    LABS  Labs reviewed- no nutrition-related labs at this time    MEDICATIONS    acetaminophen  975 mg Oral Q6H     docusate sodium  100 mg Oral BID     fish oil-omega-3 fatty acids  1 g Oral Daily     FLUoxetine  30 mg Oral QAM     ibuprofen  800 mg Oral Q6H     mometasone  1 puff Inhalation QPM     prenatal multivitamin w/iron  1 tablet Oral Daily     senna-docusate  1 tablet Oral BID    Or     senna-docusate  2 tablet Oral BID        dextrose 5% lactated ringers       - MEDICATION INSTRUCTIONS -       NO Rho (D) immune globulin (RhoGam) needed - mother Rh POSITIVE       - MEDICATION INSTRUCTIONS -       oxytocin in 0.9% NaCl       oxytocin in 0.9% NaCl 100 mL/hr (07/02/21 0003)        ANTHROPOMETRICS  Height: 174.5 cm  Most Recent Weight: 69.3 kg      IBW: 65.9 kg kg  BMI: 22.75 kg/m2  BMI: Normal BMI  Weight History:   Wt Readings from Last 10 Encounters:   06/30/21 69.3 kg (152 lb 11.2 oz)   06/23/21 69.9 kg (154 lb)   06/17/21 69.3 kg (152 lb 11.2 oz)   02/12/21 66.6 kg (146 lb 14.4 oz)   12/17/20 131.6 lbs   08/01/19 58.1 kg (128 lb)   07/15/19 58.1 kg (128 lb)    19 59 kg (130 lb 1.6 oz)   16 68.1 kg (150 lb 1.9 oz)   16 59.9 kg (132 lb 1.3 oz)   04/02/15 57.2 kg (126 lb)   02/23/15 57.6 kg (127 lb)     More rapid weight gain earlier in pregnancy, gained about 24-26 lbs which is within recommended guidelines  Dosing Weight: 58.1 kg (estimated pregravid weight based on records)    ASSESSED NUTRITION NEEDS  Estimated Energy Needs: 6589-6761 kcals/day (25 - 30 kcals/kg + ~500 d/t lactation)  Justification: Increased needs  Estimated Protein Needs: 70-81 grams protein/day (1.2-1.4)  Justification: Post-op, increased needs  Estimated Fluid Needs: 3000+ mL/day  Justification: Increased needs    MALNUTRITION  % Intake: Decreased intake does not meet criteria  % Weight Loss: Weight loss does not meet criteria  Subcutaneous Fat Loss: Unable to assess  Muscle Loss: Unable to assess  Fluid Accumulation/Edema: None noted  Malnutrition Diagnosis: Patient does not meet two of the established criteria necessary for diagnosing malnutrition    NUTRITION DIAGNOSIS  Increased nutrient needs related to lactation and healing as evidenced by lactation and s/p     INTERVENTIONS  Implementation  Nutrition Education: introduced self and role. Briefly discussed nutrition prior to admission and currently. Encouraged adequate PO.     Goals  Patient to consume % of nutritionally adequate meal trays TID, or the equivalent with supplements/snacks.     Monitoring/Evaluation  Progress toward goals will be monitored and evaluated per protocol.  Minal Renee RD, LD  ICU/5A/OB/Mental Health Pager (M-F): [phone]  On Call Pager (weekends only): [phone]

## 2021-07-03 NOTE — PLAN OF CARE
Postpartum stable. Voiding adequate amounts. PIV SL. Ambulating in room, tolerating well. Incision is c/d/I. Pain is tolerable with tylenol and ibuprofen. Pumping q3h. Visited  in NICU this evening. Spouse in room for support. Plans to shower tomorrow.

## 2021-07-04 ENCOUNTER — TELEPHONE (OUTPATIENT)
Dept: OBGYN | Facility: CLINIC | Age: 41
End: 2021-07-04

## 2021-07-04 VITALS
OXYGEN SATURATION: 100 % | DIASTOLIC BLOOD PRESSURE: 59 MMHG | TEMPERATURE: 98 F | RESPIRATION RATE: 18 BRPM | SYSTOLIC BLOOD PRESSURE: 111 MMHG | HEART RATE: 68 BPM

## 2021-07-04 DIAGNOSIS — Z98.891 S/P CESAREAN SECTION: Primary | ICD-10-CM

## 2021-07-04 PROCEDURE — 250N000013 HC RX MED GY IP 250 OP 250 PS 637

## 2021-07-04 PROCEDURE — 250N000013 HC RX MED GY IP 250 OP 250 PS 637: Performed by: STUDENT IN AN ORGANIZED HEALTH CARE EDUCATION/TRAINING PROGRAM

## 2021-07-04 PROCEDURE — 250N000013 HC RX MED GY IP 250 OP 250 PS 637: Performed by: MIDWIFE

## 2021-07-04 RX ORDER — POLYETHYLENE GLYCOL 3350 17 G/17G
17 POWDER, FOR SOLUTION ORAL DAILY
Qty: 510 G | Refills: 1 | Status: SHIPPED | OUTPATIENT
Start: 2021-07-04

## 2021-07-04 RX ORDER — IBUPROFEN 600 MG/1
600 TABLET, FILM COATED ORAL EVERY 6 HOURS
Qty: 30 TABLET | Refills: 1 | Status: ON HOLD | OUTPATIENT
Start: 2021-07-04 | End: 2021-07-21

## 2021-07-04 RX ORDER — OXYCODONE HYDROCHLORIDE 5 MG/1
5 TABLET ORAL EVERY 4 HOURS PRN
Qty: 8 TABLET | Refills: 0 | Status: ON HOLD | OUTPATIENT
Start: 2021-07-04 | End: 2021-07-21

## 2021-07-04 RX ORDER — HYDROXYZINE PAMOATE 25 MG/1
25 CAPSULE ORAL
Qty: 30 CAPSULE | Refills: 0 | Status: ON HOLD | OUTPATIENT
Start: 2021-07-04 | End: 2021-07-21

## 2021-07-04 RX ORDER — SIMETHICONE 80 MG
80 TABLET,CHEWABLE ORAL 4 TIMES DAILY PRN
Qty: 30 TABLET | Refills: 1 | Status: SHIPPED | OUTPATIENT
Start: 2021-07-04

## 2021-07-04 RX ORDER — AMOXICILLIN 250 MG
2 CAPSULE ORAL 2 TIMES DAILY
Qty: 120 TABLET | Refills: 1 | Status: SHIPPED | OUTPATIENT
Start: 2021-07-04

## 2021-07-04 RX ORDER — ACETAMINOPHEN 325 MG/1
975 TABLET ORAL EVERY 6 HOURS
Qty: 100 TABLET | Refills: 1 | Status: ON HOLD | OUTPATIENT
Start: 2021-07-04 | End: 2021-07-21

## 2021-07-04 RX ADMIN — Medication 1 G: at 09:25

## 2021-07-04 RX ADMIN — FLUOXETINE HYDROCHLORIDE 30 MG: 20 CAPSULE ORAL at 09:23

## 2021-07-04 RX ADMIN — PRENATAL VITAMINS-IRON FUMARATE 27 MG IRON-FOLIC ACID 0.8 MG TABLET 1 TABLET: at 09:24

## 2021-07-04 RX ADMIN — ACETAMINOPHEN 975 MG: 325 TABLET, FILM COATED ORAL at 04:53

## 2021-07-04 RX ADMIN — IBUPROFEN 800 MG: 800 TABLET, FILM COATED ORAL at 10:21

## 2021-07-04 RX ADMIN — SIMETHICONE CHEW TAB 80 MG 80 MG: 80 TABLET ORAL at 09:31

## 2021-07-04 RX ADMIN — SIMETHICONE CHEW TAB 80 MG 80 MG: 80 TABLET ORAL at 04:53

## 2021-07-04 RX ADMIN — DOCUSATE SODIUM AND SENNOSIDES 2 TABLET: 8.6; 5 TABLET ORAL at 09:24

## 2021-07-04 RX ADMIN — POLYETHYLENE GLYCOL 3350 17 G: 17 POWDER, FOR SOLUTION ORAL at 09:25

## 2021-07-04 RX ADMIN — IBUPROFEN 800 MG: 800 TABLET, FILM COATED ORAL at 04:53

## 2021-07-04 RX ADMIN — ACETAMINOPHEN 975 MG: 325 TABLET, FILM COATED ORAL at 10:21

## 2021-07-04 ASSESSMENT — ACTIVITIES OF DAILY LIVING (ADL): DEPENDENT_IADLS:: INDEPENDENT

## 2021-07-04 NOTE — PROGRESS NOTES
OB staff Post Partum Note  S: patient feeling well, is hoping to discharge to home today, although it is difficult with baby not going with them..  She states pain has been well controlled with ibuprofen and tylenol.  Abdomen feels distended, but passing more gas today.    O:  Patient Vitals for the past 24 hrs:   BP Temp Temp src Pulse Resp   07/04/21 0938 111/59 98  F (36.7  C) -- 68 18   07/04/21 0538 108/56 97.8  F (36.6  C) Oral 58 16   07/03/21 2306 111/61 98.5  F (36.9  C) Oral 65 18   07/03/21 1513 106/68 98.1  F (36.7  C) Oral 68 16     Gen'l: appears well  CV: Regular rhythm, well perfused  Resp: comfortable respirations on room air  Abd: mild distention, fundus firm 1 cm below umbilicus  Incision: clean/dry/intact with steri strips  Ext: NT, trace edema    Assessment/Plan:  39 yo  POD#3 s/p RLTCS for category 2 FHT remote from delivery.  Asymptomatic Acute blood loss anemia from surgical blood loss    - Plan to discharge home on iron  - Continue bowel regimen  - Rh positive, rubella immune  - discharge to home today, follow-up with incision and mood check in 1-2 weeks.    Anca Aguilar MD

## 2021-07-04 NOTE — PLAN OF CARE
Stable postpartum. Medication given for incision pain. Pt states gas pain uncomfortable. Medication given and encouraged pt to ambulate when able. Plan to visit baby in the NICU today. Will likely discharge later today.

## 2021-07-04 NOTE — PLAN OF CARE
Postpartum stable. Voiding adequate amounts. Passing gas. Incision is JULI and intact. Pain is tolerable with tylenol and ibuprofen. Ambulating in room independently. PUmping independently.Visiting  in NICU. Spouse in room for support. Continue POC.

## 2021-07-04 NOTE — PLAN OF CARE
Data: Vital signs stable, postpartum assessments within normal limits.   Eating and drinking without nausea or vomiting.  Up ad matthieu, and voiding without difficulty..  Pain managed with tylenol and ibuprofen. Pt states she is comfortable.  Perineum/Incision appears to be healing well, no s/s infection.  Discharge outcomes on care plan met.   Action: Review of care plan, teaching, and discharge instructions done.  Response: Patient states understanding and comfort with her discharge instructions and plan of care. All questions addressed. She will discharge home family support available. Baby will remain in the NICU

## 2021-07-04 NOTE — PLAN OF CARE
PT VSS on room air. Up ad matthieu and voiding spontaneously. On a regular diet. Pain is managed with Tylenol, Ibuprofen, heat packs, ice, and repositioning. Abdominal binder in place for comfort and PT was assisted with repositioning. PT reporting feelings of bloating gas pain. Simethicone given and fluids and movement encouraged. PT received a suppository last evening. Bowel sounds hypoactive. Abdomen appears distended but is soft. Discussed diet modifications with PT.  PT expressing sadness and frustration over having had a  section and having her baby in the NICU. Empathetic listening provided and strengths emphasized. Spouse is present at bedside for support.  Fundus is firm and midline. Bleeding is scant. PT is pumping independently and hand expressing with staff assistance. PT's left nipple bleeds with hand expression and pumping, and PT expressing concerns. Education and reassurance provided.  Plan of care reviewed with patient. Plan to discharge home today.

## 2021-07-04 NOTE — TELEPHONE ENCOUNTER
Samantha called requesting prescription for vistaril.  Has been taking for sleep and feels will need to continue for a while.  Reviewed that antihistamines can decrease milk supply, but ok to use as needed.  Prescription sent to pharmacy.  Anca Aguilar MD

## 2021-07-05 ENCOUNTER — LACTATION ENCOUNTER (OUTPATIENT)
Age: 41
End: 2021-07-05

## 2021-07-05 NOTE — ANESTHESIA POSTPROCEDURE EVALUATION
Patient: Samantha Dudley    Procedure(s):   SECTION    Diagnosis:* No pre-op diagnosis entered *  Diagnosis Additional Information: No value filed.    Anesthesia Type:  Epidural    Note:  Disposition: Inpatient   Postop Pain Control: Uneventful            Sign Out: Well controlled pain   PONV: No   Neuro/Psych: Uneventful            Sign Out: Acceptable/Baseline neuro status   Airway/Respiratory: Uneventful            Sign Out: Acceptable/Baseline resp. status   CV/Hemodynamics: Uneventful            Sign Out: Acceptable CV status; No obvious hypovolemia; No obvious fluid overload   Other NRE: NONE   DID A NON-ROUTINE EVENT OCCUR? No           Last vitals:  Vitals:    21 2306 21 0538 21 0938   BP: 111/61 108/56 111/59   Pulse: 65 58 68   Resp: 18 16 18   Temp: 36.9  C (98.5  F) 36.6  C (97.8  F) 36.7  C (98  F)   SpO2:          Last vitals prior to Anesthesia Care Transfer:  CRNA VITALS  2021 2226 - 2021 2326      2021             Resp Rate (observed):  (!) 1          Electronically Signed By: Deja Keyes MD  2021  7:18 AM

## 2021-07-06 LAB — COPATH REPORT: NORMAL

## 2021-07-14 ENCOUNTER — HOSPITAL ENCOUNTER (OUTPATIENT)
Facility: CLINIC | Age: 41
Setting detail: OBSERVATION
Discharge: HOME HEALTH | End: 2021-07-14
Attending: OBSTETRICS & GYNECOLOGY | Admitting: OBSTETRICS & GYNECOLOGY
Payer: COMMERCIAL

## 2021-07-14 ENCOUNTER — TELEPHONE (OUTPATIENT)
Dept: OBGYN | Facility: CLINIC | Age: 41
End: 2021-07-14

## 2021-07-14 ENCOUNTER — TRANSFERRED RECORDS (OUTPATIENT)
Dept: HEALTH INFORMATION MANAGEMENT | Facility: CLINIC | Age: 41
End: 2021-07-14

## 2021-07-14 ENCOUNTER — ANESTHESIA EVENT (OUTPATIENT)
Dept: SURGERY | Facility: CLINIC | Age: 41
End: 2021-07-14
Payer: COMMERCIAL

## 2021-07-14 ENCOUNTER — ANESTHESIA (OUTPATIENT)
Dept: SURGERY | Facility: CLINIC | Age: 41
End: 2021-07-14
Payer: COMMERCIAL

## 2021-07-14 VITALS
OXYGEN SATURATION: 100 % | SYSTOLIC BLOOD PRESSURE: 134 MMHG | DIASTOLIC BLOOD PRESSURE: 53 MMHG | TEMPERATURE: 99 F | HEART RATE: 75 BPM | RESPIRATION RATE: 16 BRPM

## 2021-07-14 LAB
ABO + RH BLD: NORMAL
ABO + RH BLD: NORMAL
ABO/RH(D): NORMAL
ANTIBODY SCREEN: NEGATIVE
BLD GP AB SCN SERPL QL: NORMAL
BLOOD BANK CMNT PATIENT-IMP: NORMAL
ERYTHROCYTE [DISTWIDTH] IN BLOOD BY AUTOMATED COUNT: 11.9 % (ref 10–15)
HCT VFR BLD AUTO: 34.3 % (ref 35–47)
HGB BLD-MCNC: 11.4 G/DL (ref 11.7–15.7)
MCH RBC QN AUTO: 34.5 PG (ref 26.5–33)
MCHC RBC AUTO-ENTMCNC: 33.2 G/DL (ref 31.5–36.5)
MCV RBC AUTO: 104 FL (ref 78–100)
PLATELET # BLD AUTO: 282 10E3/UL (ref 150–450)
RBC # BLD AUTO: 3.3 10E6/UL (ref 3.8–5.2)
SARS-COV-2 RNA RESP QL NAA+PROBE: NEGATIVE
SPECIMEN EXP DATE BLD: NORMAL
SPECIMEN EXPIRATION DATE: NORMAL
WBC # BLD AUTO: 6 10E3/UL (ref 4–11)

## 2021-07-14 PROCEDURE — 86900 BLOOD TYPING SEROLOGIC ABO: CPT | Performed by: OBSTETRICS & GYNECOLOGY

## 2021-07-14 PROCEDURE — 20552 NJX 1/MLT TRIGGER POINT 1/2: CPT | Mod: GC | Performed by: OBSTETRICS & GYNECOLOGY

## 2021-07-14 PROCEDURE — 250N000011 HC RX IP 250 OP 636: Performed by: OBSTETRICS & GYNECOLOGY

## 2021-07-14 PROCEDURE — 36415 COLL VENOUS BLD VENIPUNCTURE: CPT | Performed by: STUDENT IN AN ORGANIZED HEALTH CARE EDUCATION/TRAINING PROGRAM

## 2021-07-14 PROCEDURE — 88305 TISSUE EXAM BY PATHOLOGIST: CPT | Mod: 26 | Performed by: PATHOLOGY

## 2021-07-14 PROCEDURE — 87635 SARS-COV-2 COVID-19 AMP PRB: CPT | Performed by: STUDENT IN AN ORGANIZED HEALTH CARE EDUCATION/TRAINING PROGRAM

## 2021-07-14 PROCEDURE — 250N000009 HC RX 250: Performed by: OBSTETRICS & GYNECOLOGY

## 2021-07-14 PROCEDURE — 88305 TISSUE EXAM BY PATHOLOGIST: CPT | Mod: TC | Performed by: OBSTETRICS & GYNECOLOGY

## 2021-07-14 PROCEDURE — 370N000017 HC ANESTHESIA TECHNICAL FEE, PER MIN: Performed by: OBSTETRICS & GYNECOLOGY

## 2021-07-14 PROCEDURE — 250N000013 HC RX MED GY IP 250 OP 250 PS 637: Performed by: STUDENT IN AN ORGANIZED HEALTH CARE EDUCATION/TRAINING PROGRAM

## 2021-07-14 PROCEDURE — 59160 D & C AFTER DELIVERY: CPT | Mod: GC | Performed by: OBSTETRICS & GYNECOLOGY

## 2021-07-14 PROCEDURE — 258N000003 HC RX IP 258 OP 636: Performed by: STUDENT IN AN ORGANIZED HEALTH CARE EDUCATION/TRAINING PROGRAM

## 2021-07-14 PROCEDURE — 999N000141 HC STATISTIC PRE-PROCEDURE NURSING ASSESSMENT: Performed by: OBSTETRICS & GYNECOLOGY

## 2021-07-14 PROCEDURE — 85027 COMPLETE CBC AUTOMATED: CPT | Performed by: STUDENT IN AN ORGANIZED HEALTH CARE EDUCATION/TRAINING PROGRAM

## 2021-07-14 PROCEDURE — 86901 BLOOD TYPING SEROLOGIC RH(D): CPT | Performed by: OBSTETRICS & GYNECOLOGY

## 2021-07-14 PROCEDURE — 272N000001 HC OR GENERAL SUPPLY STERILE: Performed by: OBSTETRICS & GYNECOLOGY

## 2021-07-14 PROCEDURE — 86900 BLOOD TYPING SEROLOGIC ABO: CPT | Performed by: STUDENT IN AN ORGANIZED HEALTH CARE EDUCATION/TRAINING PROGRAM

## 2021-07-14 PROCEDURE — 258N000003 HC RX IP 258 OP 636: Performed by: NURSE ANESTHETIST, CERTIFIED REGISTERED

## 2021-07-14 PROCEDURE — 360N000076 HC SURGERY LEVEL 3, PER MIN: Performed by: OBSTETRICS & GYNECOLOGY

## 2021-07-14 PROCEDURE — 250N000011 HC RX IP 250 OP 636: Performed by: NURSE ANESTHETIST, CERTIFIED REGISTERED

## 2021-07-14 PROCEDURE — 250N000009 HC RX 250: Performed by: NURSE ANESTHETIST, CERTIFIED REGISTERED

## 2021-07-14 PROCEDURE — 86850 RBC ANTIBODY SCREEN: CPT | Performed by: OBSTETRICS & GYNECOLOGY

## 2021-07-14 PROCEDURE — G0378 HOSPITAL OBSERVATION PER HR: HCPCS

## 2021-07-14 RX ORDER — LIDOCAINE HYDROCHLORIDE 10 MG/ML
INJECTION, SOLUTION INFILTRATION; PERINEURAL PRN
Status: DISCONTINUED | OUTPATIENT
Start: 2021-07-14 | End: 2021-07-14 | Stop reason: HOSPADM

## 2021-07-14 RX ORDER — SODIUM CHLORIDE, SODIUM LACTATE, POTASSIUM CHLORIDE, CALCIUM CHLORIDE 600; 310; 30; 20 MG/100ML; MG/100ML; MG/100ML; MG/100ML
INJECTION, SOLUTION INTRAVENOUS CONTINUOUS
Status: CANCELLED | OUTPATIENT
Start: 2021-07-14

## 2021-07-14 RX ORDER — ONDANSETRON 4 MG/1
4 TABLET, ORALLY DISINTEGRATING ORAL EVERY 6 HOURS PRN
Status: DISCONTINUED | OUTPATIENT
Start: 2021-07-14 | End: 2021-07-14 | Stop reason: HOSPADM

## 2021-07-14 RX ORDER — ACETAMINOPHEN 325 MG/1
975 TABLET ORAL ONCE
Status: CANCELLED | OUTPATIENT
Start: 2021-07-14 | End: 2021-07-14

## 2021-07-14 RX ORDER — HYDROMORPHONE HCL IN WATER/PF 6 MG/30 ML
0.2 PATIENT CONTROLLED ANALGESIA SYRINGE INTRAVENOUS EVERY 5 MIN PRN
Status: CANCELLED | OUTPATIENT
Start: 2021-07-14 | End: 2021-07-14

## 2021-07-14 RX ORDER — IBUPROFEN 800 MG/1
800 TABLET, FILM COATED ORAL ONCE
Status: DISCONTINUED | OUTPATIENT
Start: 2021-07-14 | End: 2021-07-14

## 2021-07-14 RX ORDER — BUPIVACAINE HYDROCHLORIDE 2.5 MG/ML
INJECTION, SOLUTION INFILTRATION; PERINEURAL PRN
Status: DISCONTINUED | OUTPATIENT
Start: 2021-07-14 | End: 2021-07-14 | Stop reason: HOSPADM

## 2021-07-14 RX ORDER — KETOROLAC TROMETHAMINE 30 MG/ML
INJECTION, SOLUTION INTRAMUSCULAR; INTRAVENOUS PRN
Status: DISCONTINUED | OUTPATIENT
Start: 2021-07-14 | End: 2021-07-14

## 2021-07-14 RX ORDER — FENTANYL CITRATE 50 UG/ML
50 INJECTION, SOLUTION INTRAMUSCULAR; INTRAVENOUS EVERY 5 MIN PRN
Status: CANCELLED | OUTPATIENT
Start: 2021-07-14 | End: 2021-07-14

## 2021-07-14 RX ORDER — METHYLERGONOVINE MALEATE 0.2 MG/ML
INJECTION INTRAVENOUS PRN
Status: DISCONTINUED | OUTPATIENT
Start: 2021-07-14 | End: 2021-07-14

## 2021-07-14 RX ORDER — SODIUM CHLORIDE, SODIUM LACTATE, POTASSIUM CHLORIDE, CALCIUM CHLORIDE 600; 310; 30; 20 MG/100ML; MG/100ML; MG/100ML; MG/100ML
INJECTION, SOLUTION INTRAVENOUS CONTINUOUS
Status: DISCONTINUED | OUTPATIENT
Start: 2021-07-14 | End: 2021-07-14 | Stop reason: HOSPADM

## 2021-07-14 RX ORDER — PROPOFOL 10 MG/ML
INJECTION, EMULSION INTRAVENOUS CONTINUOUS PRN
Status: DISCONTINUED | OUTPATIENT
Start: 2021-07-14 | End: 2021-07-14

## 2021-07-14 RX ORDER — DEXAMETHASONE SODIUM PHOSPHATE 4 MG/ML
INJECTION, SOLUTION INTRA-ARTICULAR; INTRALESIONAL; INTRAMUSCULAR; INTRAVENOUS; SOFT TISSUE PRN
Status: DISCONTINUED | OUTPATIENT
Start: 2021-07-14 | End: 2021-07-14

## 2021-07-14 RX ORDER — OXYCODONE HYDROCHLORIDE 5 MG/1
5 TABLET ORAL EVERY 4 HOURS PRN
Status: DISCONTINUED | OUTPATIENT
Start: 2021-07-14 | End: 2021-07-14 | Stop reason: HOSPADM

## 2021-07-14 RX ORDER — NALOXONE HYDROCHLORIDE 0.4 MG/ML
0.4 INJECTION, SOLUTION INTRAMUSCULAR; INTRAVENOUS; SUBCUTANEOUS
Status: DISCONTINUED | OUTPATIENT
Start: 2021-07-14 | End: 2021-07-14 | Stop reason: HOSPADM

## 2021-07-14 RX ORDER — NALOXONE HYDROCHLORIDE 0.4 MG/ML
0.2 INJECTION, SOLUTION INTRAMUSCULAR; INTRAVENOUS; SUBCUTANEOUS
Status: DISCONTINUED | OUTPATIENT
Start: 2021-07-14 | End: 2021-07-14 | Stop reason: HOSPADM

## 2021-07-14 RX ORDER — ONDANSETRON 4 MG/1
4 TABLET, ORALLY DISINTEGRATING ORAL EVERY 30 MIN PRN
Status: CANCELLED | OUTPATIENT
Start: 2021-07-14

## 2021-07-14 RX ORDER — AMOXICILLIN 250 MG
2 CAPSULE ORAL 2 TIMES DAILY
Status: DISCONTINUED | OUTPATIENT
Start: 2021-07-14 | End: 2021-07-14 | Stop reason: HOSPADM

## 2021-07-14 RX ORDER — TRIAMCINOLONE ACETONIDE 40 MG/ML
INJECTION, SUSPENSION INTRA-ARTICULAR; INTRAMUSCULAR PRN
Status: DISCONTINUED | OUTPATIENT
Start: 2021-07-14 | End: 2021-07-14 | Stop reason: HOSPADM

## 2021-07-14 RX ORDER — ONDANSETRON 2 MG/ML
4 INJECTION INTRAMUSCULAR; INTRAVENOUS EVERY 6 HOURS PRN
Status: DISCONTINUED | OUTPATIENT
Start: 2021-07-14 | End: 2021-07-14 | Stop reason: HOSPADM

## 2021-07-14 RX ORDER — ONDANSETRON 2 MG/ML
4 INJECTION INTRAMUSCULAR; INTRAVENOUS EVERY 30 MIN PRN
Status: CANCELLED | OUTPATIENT
Start: 2021-07-14

## 2021-07-14 RX ORDER — MEPERIDINE HYDROCHLORIDE 25 MG/ML
12.5 INJECTION INTRAMUSCULAR; INTRAVENOUS; SUBCUTANEOUS
Status: CANCELLED | OUTPATIENT
Start: 2021-07-14

## 2021-07-14 RX ORDER — ONDANSETRON 2 MG/ML
INJECTION INTRAMUSCULAR; INTRAVENOUS PRN
Status: DISCONTINUED | OUTPATIENT
Start: 2021-07-14 | End: 2021-07-14

## 2021-07-14 RX ORDER — IBUPROFEN 600 MG/1
600 TABLET, FILM COATED ORAL EVERY 6 HOURS PRN
Status: DISCONTINUED | OUTPATIENT
Start: 2021-07-14 | End: 2021-07-14 | Stop reason: HOSPADM

## 2021-07-14 RX ORDER — ACETAMINOPHEN 325 MG/10.15ML
650 LIQUID ORAL EVERY 4 HOURS PRN
Status: DISCONTINUED | OUTPATIENT
Start: 2021-07-14 | End: 2021-07-14 | Stop reason: HOSPADM

## 2021-07-14 RX ORDER — ACETAMINOPHEN 325 MG/1
975 TABLET ORAL ONCE
Status: DISCONTINUED | OUTPATIENT
Start: 2021-07-14 | End: 2021-07-14 | Stop reason: HOSPADM

## 2021-07-14 RX ORDER — LIDOCAINE HYDROCHLORIDE 20 MG/ML
INJECTION, SOLUTION INFILTRATION; PERINEURAL PRN
Status: DISCONTINUED | OUTPATIENT
Start: 2021-07-14 | End: 2021-07-14

## 2021-07-14 RX ORDER — FENTANYL CITRATE 50 UG/ML
INJECTION, SOLUTION INTRAMUSCULAR; INTRAVENOUS PRN
Status: DISCONTINUED | OUTPATIENT
Start: 2021-07-14 | End: 2021-07-14

## 2021-07-14 RX ORDER — ACETAMINOPHEN 325 MG/1
975 TABLET ORAL ONCE
Status: DISCONTINUED | OUTPATIENT
Start: 2021-07-14 | End: 2021-07-14

## 2021-07-14 RX ADMIN — IBUPROFEN 600 MG: 600 TABLET, FILM COATED ORAL at 06:03

## 2021-07-14 RX ADMIN — PROPOFOL 120 MCG/KG/MIN: 10 INJECTION, EMULSION INTRAVENOUS at 09:59

## 2021-07-14 RX ADMIN — DEXAMETHASONE SODIUM PHOSPHATE 8 MG: 4 INJECTION, SOLUTION INTRAMUSCULAR; INTRAVENOUS at 10:07

## 2021-07-14 RX ADMIN — ONDANSETRON 4 MG: 2 INJECTION INTRAMUSCULAR; INTRAVENOUS at 10:07

## 2021-07-14 RX ADMIN — METHYLERGONOVINE MALEATE 200 MCG: 0.2 INJECTION INTRAVENOUS at 10:45

## 2021-07-14 RX ADMIN — ACETAMINOPHEN 650 MG: 325 SOLUTION ORAL at 06:03

## 2021-07-14 RX ADMIN — FENTANYL CITRATE 25 MCG: 50 INJECTION, SOLUTION INTRAMUSCULAR; INTRAVENOUS at 10:42

## 2021-07-14 RX ADMIN — DOCUSATE SODIUM AND SENNOSIDES 2 TABLET: 8.6; 5 TABLET ORAL at 13:00

## 2021-07-14 RX ADMIN — PROPOFOL 10 MG: 10 INJECTION, EMULSION INTRAVENOUS at 10:01

## 2021-07-14 RX ADMIN — PROPOFOL 20 MG: 10 INJECTION, EMULSION INTRAVENOUS at 10:04

## 2021-07-14 RX ADMIN — KETOROLAC TROMETHAMINE 30 MG: 30 INJECTION, SOLUTION INTRAMUSCULAR at 10:48

## 2021-07-14 RX ADMIN — SODIUM CHLORIDE, POTASSIUM CHLORIDE, SODIUM LACTATE AND CALCIUM CHLORIDE: 600; 310; 30; 20 INJECTION, SOLUTION INTRAVENOUS at 06:42

## 2021-07-14 RX ADMIN — TRANEXAMIC ACID 1 G: 1 INJECTION, SOLUTION INTRAVENOUS at 10:44

## 2021-07-14 RX ADMIN — PROPOFOL 30 MG: 10 INJECTION, EMULSION INTRAVENOUS at 10:00

## 2021-07-14 RX ADMIN — SODIUM CHLORIDE, POTASSIUM CHLORIDE, SODIUM LACTATE AND CALCIUM CHLORIDE: 600; 310; 30; 20 INJECTION, SOLUTION INTRAVENOUS at 10:47

## 2021-07-14 RX ADMIN — PROPOFOL 20 MG: 10 INJECTION, EMULSION INTRAVENOUS at 10:42

## 2021-07-14 RX ADMIN — PROPOFOL 20 MG: 10 INJECTION, EMULSION INTRAVENOUS at 10:15

## 2021-07-14 RX ADMIN — PROPOFOL 20 MG: 10 INJECTION, EMULSION INTRAVENOUS at 10:02

## 2021-07-14 RX ADMIN — ACETAMINOPHEN 650 MG: 325 SOLUTION ORAL at 13:00

## 2021-07-14 RX ADMIN — FENTANYL CITRATE 25 MCG: 50 INJECTION, SOLUTION INTRAMUSCULAR; INTRAVENOUS at 10:01

## 2021-07-14 RX ADMIN — LIDOCAINE HYDROCHLORIDE 80 MG: 20 INJECTION, SOLUTION INFILTRATION; PERINEURAL at 10:00

## 2021-07-14 NOTE — TELEPHONE ENCOUNTER
----- Message from Lynette Cantrell MD sent at 7/14/2021 12:20 AM CDT -----  Regarding: followup after ER visit for postpartum bleeding  Hi team,    Samantha called tyshawn because of heavy bleeding. She went to an ER close to her home. Would you mind calling her tomorrow and checking on her?    Thanks,    Lynette

## 2021-07-14 NOTE — ANESTHESIA CARE TRANSFER NOTE
Patient: Samantha Dudley    Procedure(s):  HYSTEROSCOPY, WITH SUCTION DILATION AND CURETTAGE OF UTERUS USING MORCELLATOR    Diagnosis: Vaginal bleeding [N93.9]  Diagnosis Additional Information: No value filed.    Anesthesia Type:   MAC     Note:    Oropharynx: oropharynx clear of all foreign objects and spontaneously breathing  Level of Consciousness: drowsy and awake  Oxygen Supplementation: room air    Independent Airway: airway patency satisfactory and stable  Dentition: dentition unchanged  Vital Signs Stable: post-procedure vital signs reviewed and stable  Report to RN Given: handoff report given  Patient transferred to: Medical/Surgical Unit  Comments: Pt transported to Med/surg unit with CRNA, RN, and nursing student. Report given to RN. All questions answered and concerns addressed. Pt able to talk and answer all questions. Pt stating pain as cramping 1 or 2.  O2 saturation 100%   HR 62  /67 (93)  Temp 97.0    Handoff Report: Identifed the Patient, Identified the Reponsible Provider, Reviewed the pertinent medical history, Discussed the surgical course, Reviewed Intra-OP anesthesia mangement and issues during anesthesia, Set expectations for post-procedure period and Allowed opportunity for questions and acknowledgement of understanding      Vitals: (Last set prior to Anesthesia Care Transfer)  CRNA VITALS  7/14/2021 1028 - 7/14/2021 1110      7/14/2021             Pulse:  74    SpO2:  91 %        Electronically Signed By: ANDREY Olivas CRNA  July 14, 2021  11:10 AM

## 2021-07-14 NOTE — ANESTHESIA PREPROCEDURE EVALUATION
"Anesthesia Pre-Procedure Evaluation    Patient: Samantha Dudley   MRN: [id number] : 1980        Preoperative Diagnosis: Vaginal bleeding [N93.9]   Procedure : Procedure(s):  HYSTEROSCOPY, WITH DILATION AND CURETTAGE OF UTERUS USING MORCELLATOR     Past Medical History:   Diagnosis Date     Asthma      Brachial neuritis 2011    resolved with P     Cervical dysplasia 2010    treated in [place], normal f/u paps     Eating disorder     previously on Prozac     Generalized anxiety disorder 2013    Problem resolved at pt request  that it be removed from problem list.      Hoarseness      Menarche age 14    cycles q mo x 7d, heavy, w cramps     Nasal congestion      Neck pain 2011     Oral contraceptive use age 18    for cycle control     Sore throat      Trouble breathing     At night      Past Surgical History:   Procedure Laterality Date      SECTION N/A 2021    Procedure:  SECTION;  Surgeon: Rina Mcdonald MD;  Location: [facility] L+D     CONIZATION LEEP      \"most painful thing I've ever had\"     perianal lesion excision  child    scar on R @ 2:00 -- benign lesion      [place] teeth extraction  18      Allergies   Allergen Reactions     Other [No Clinical Screening - See Comments] Anaphylaxis     Triamenic cough syrup.  Per patients father when she was 5 y.o.     Cat Hair Extract Itching      Social History     Tobacco Use     Smoking status: Never Smoker     Smokeless tobacco: Never Used   Substance Use Topics     Alcohol use: Yes     Comment: 2-3 drinks 1x/wk max      Wt Readings from Last 1 Encounters:   21 69.3 kg (152 lb 11.2 oz)        Anesthesia Evaluation            ROS/MED HX  ENT/Pulmonary:     (+) asthma     Neurologic:       Cardiovascular:  - neg cardiovascular ROS     METS/Exercise Tolerance:     Hematologic:    (-) anemia   Musculoskeletal:       GI/Hepatic:  - neg GI/hepatic ROS     Renal/Genitourinary:  - neg Renal ROS     Endo:  - " neg endo ROS     Psychiatric/Substance Use:     (+) psychiatric history (Bulimia nervosa) anxiety     Infectious Disease:  - neg infectious disease ROS     Malignancy:  - neg malignancy ROS     Other: Comment: Presenting for D&C for possible retained products of conception in the setting of bleeding following  after failed TOLAC.    (-) Any chance pregnant          OUTSIDE LABS:  CBC:   Lab Results   Component Value Date    WBC 6.0 2021    WBC 9.3 2021    HGB 11.4 (L) 2021    HGB 10.8 (L) 2021    HCT 34.3 (L) 2021    HCT 35.8 2021     2021     2021     BMP:   Lab Results   Component Value Date    .0 2014    .0 2012    POTASSIUM 4.6 2014    POTASSIUM 3.9 2012    CHLORIDE 101.0 2014    CHLORIDE 97.0 2012    CO2 28.0 2014    CO2 29.0 2012    BUN 11.0 2014    BUN 13.0 2012    CR 0.8 2014    CR 0.7 2012    GLC 95.0 2014    .0 2012     COAGS: No results found for: PTT, INR, FIBR  POC:   Lab Results   Component Value Date    HCG NEGATIVE 2015     HEPATIC: No results found for: ALBUMIN, PROTTOTAL, ALT, AST, GGT, ALKPHOS, BILITOTAL, BILIDIRECT, SHAILESH  OTHER:   Lab Results   Component Value Date    KARYN 9.0 2014    TSH 1.49 2012    CRP <5.0 2014       Anesthesia Plan    ASA Status:  2      Anesthesia Type: MAC.     - Reason for MAC: immobility needed              Consents         - Extended Intubation/Ventilatory Support Discussed: No.      - Patient is DNR/DNI Status: No         Postoperative Care    Pain management: IV analgesics, Oral pain medications.   PONV prophylaxis: Ondansetron (or other 5HT-3)     Comments:                Noah Narayanan MD

## 2021-07-14 NOTE — ANESTHESIA POSTPROCEDURE EVALUATION
Patient: Samantha Dudley    Procedure(s):  HYSTEROSCOPY, WITH SUCTION DILATION AND CURETTAGE OF UTERUS USING MORCELLATOR    Diagnosis:Vaginal bleeding [N93.9]  Diagnosis Additional Information: No value filed.    Anesthesia Type:  MAC    Note:  Disposition: Outpatient   Postop Pain Control: Uneventful            Sign Out: Well controlled pain   PONV: No   Neuro/Psych: Uneventful            Sign Out: Acceptable/Baseline neuro status   Airway/Respiratory: Uneventful            Sign Out: Acceptable/Baseline resp. status   CV/Hemodynamics: Uneventful            Sign Out: Acceptable CV status   Other NRE: NONE   DID A NON-ROUTINE EVENT OCCUR? No           Last vitals:  Vitals:    07/14/21 1126 07/14/21 1141 07/14/21 1204   BP: 130/64 135/55 122/62   Pulse: 66 58 65   Resp: 16 16 18   Temp: 36.6  C (97.8  F) 36.7  C (98  F) 37.2  C (99  F)   SpO2: 100% 98% 99%       Last vitals prior to Anesthesia Care Transfer:  CRNA VITALS  7/14/2021 1028 - 7/14/2021 1128      7/14/2021             Pulse:  74    SpO2:  91 %          Electronically Signed By: Noah Narayanan MD  July 14, 2021  12:52 PM

## 2021-07-14 NOTE — TELEPHONE ENCOUNTER
Spoke with Dr. Pretty at [facility].     Initially > 1 pad per hour, now less. Hgb stable. US with thickened EMB and vascularity concerning for RPOC.    Would like to transfer to [facility] for D&C.     Patient placement contacted, will transfer to 5th floor.     Lynette Cantrell MD, MSCI    [facility]/OBGYN

## 2021-07-14 NOTE — PLAN OF CARE
VS: /47   Pulse 76   Temp 98.2  F (36.8  C) (Oral)   Resp 16   SpO2 97%     O2: 97% on room air. Lung sounds clear. Pt denies chest pain, SOB, coughs    Output: Voids spontaneously without difficulty in the bathroom.    Last BM: 7/13/21. Bowel sounds active.    Activity: Independent    Skin: Intact except the lower abdomen incision from C - section   Pain: Julia pain at incision. Prn Tylenol and ibuprofen given. Pt likes to take tylenol and ibuprofen together.     CMS /Neuro: A&O x 4. Pt denies numbness or tingling.    Dressing: Strie strip on C - section intact.    Diet: NPO   LDA: PIV  right forearm infusing  ml/hr    Equipment: IV pump/pole, call light, breast pump, wheelchair and personal belongings    Plan: NPO. Possible D&C procedure today.   Additional Info:

## 2021-07-14 NOTE — PLAN OF CARE
DISCHARGE SUMMARY    Pt discharging to: home  Transportation: wheelchair with  assist  AVS given and discussed: yes, also discussed with OBGYN resident and RN  Medications given: NA, pt has all meds at home filled  Belongings returned: Yes  Comments: Tolerated well, questions answered by OBGYN resident

## 2021-07-14 NOTE — H&P
"[facility] Gynecology History and Physical    Samantha Dudley MRN# [id number]   Age: 40 year old YOB: 1980       HPI: Samantha Dudley is a 40 year old  POD#13 who presents as a ERNESTO from [facility] with retained products of conception after a RLTCS for failed TOLAC. Patient notes having increased cramping and vaginal bleeding yesterday during the day time while she was pumping. The bleeding soaked through her clothes onto the chair. The bleeding subsided until 11 PM at which point she noted heavy bleeding once more while pumping. She says that the bleeding was heavy enough to soak at least 2 pads. She felt dizzy during those episodes but denies current dizziness. Since arrival to the hospital, her bleeding has been mild. She has been taking ibuprofen/tylenol for pain after her . She has been trying to avoid oxycodone. She notes some pain on the right side of her incision and deeper as well. Requests a breast pump.     Denies headaches, dizziness, chest pain, SOB, nausea/vomiting, fevers/chills, foul-odor discharge, dysuria    PMH:  Past Medical History:   Diagnosis Date     Asthma      Brachial neuritis 2011    resolved with P     Cervical dysplasia 2010    treated in [place], normal f/u paps     Eating disorder     previously on Prozac     Generalized anxiety disorder 2013    Problem resolved at pt request  that it be removed from problem list.      Hoarseness      Menarche age 14    cycles q mo x 7d, heavy, w cramps     Nasal congestion      Neck pain 2011     Oral contraceptive use age 18    for cycle control     Sore throat      Trouble breathing     At night       PSHx:   Past Surgical History:   Procedure Laterality Date      SECTION N/A 2021    Procedure:  SECTION;  Surgeon: Rina Mcdonald MD;  Location: [facility] L+D     CONIZATION LEEP      \"most painful thing I've ever had\"     perianal lesion excision  " child    scar on R @ 2:00 -- benign lesion      [place] teeth extraction  18       Medications:  Current Facility-Administered Medications   Medication     acetaminophen (TYLENOL) solution 650 mg     ibuprofen (ADVIL/MOTRIN) tablet 600 mg     lactated ringers infusion     ondansetron (ZOFRAN-ODT) ODT tab 4 mg    Or     ondansetron (ZOFRAN) injection 4 mg     No current facility-administered medications on file prior to encounter.  acetaminophen (TYLENOL) 325 MG tablet, Take 3 tablets (975 mg) by mouth every 6 hours  albuterol (PROAIR HFA/PROVENTIL HFA/VENTOLIN HFA) 108 (90 Base) MCG/ACT inhaler, albuterol sulfate HFA 90 mcg/actuation aerosol inhaler  doxylamine (UNISOM) 25 MG TABS tablet, Take 25 mg by mouth At Bedtime  FLUoxetine (PROZAC) 10 MG capsule, Take 30 mg by mouth daily.  hydrOXYzine (VISTARIL) 25 MG capsule, Take 1 capsule (25 mg) by mouth nightly as needed for itching or other (sleep)  ibuprofen (ADVIL/MOTRIN) 600 MG tablet, Take 1 tablet (600 mg) by mouth every 6 hours  magnesium 250 MG tablet, Take 1 tablet by mouth daily  mometasone (ASMANEX TWISTHALER) 220 MCG/INH inhaler, Inhale into the lungs every evening  multivitamin w/minerals (MULTI-VITAMIN) tablet, Take 1 tablet by mouth daily  Omega-3 Fatty Acids (FISH OIL PO), Take by mouth daily  oxyCODONE (ROXICODONE) 5 MG tablet, Take 1 tablet (5 mg) by mouth every 4 hours as needed for severe pain  polyethylene glycol (MIRALAX) 17 GM/Dose powder, Take 17 g by mouth daily  Prenatal Vit-Fe Fumarate-FA (PRENATAL MULTIVITAMIN W/IRON) 27-0.8 MG tablet, Take 1 tablet by mouth daily  senna-docusate (SENOKOT-S/PERICOLACE) 8.6-50 MG tablet, Take 2 tablets by mouth 2 times daily  simethicone (MYLICON) 80 MG chewable tablet, Take 1 tablet (80 mg) by mouth 4 times daily as needed for other (gas)        Allergies:     Allergies   Allergen Reactions     Other [No Clinical Screening - See Comments] Anaphylaxis     Triamenic cough syrup.  Per patients father when she  was 5 y.o.     Cat Hair Extract Itching       Social History:  Social History     Tobacco Use     Smoking status: Never Smoker     Smokeless tobacco: Never Used   Substance Use Topics     Alcohol use: Yes     Comment: 2-3 drinks 1x/wk max     Drug use: No       Physical Exam:   There were no vitals filed for this visit.   Gen: no acute distress, resting comfortably  CV: well perfused  Pulm: normal rate and effort  Abd: non-tender, non-distended. Incision with steristrips in place. Non-erythematous, non-draining, non-indurated  Extremities: non-tender, no erythema; no edema    Labs:  Hgb 11.2 at OSH    TVUS:  FINDINGS:   Uterus: 11.9 x 7.4 x 9.0 cm.  Normal echotexture of the myometrium.  No masses.     Endometrium: Endometrial thickness measures 24 mm.  Heterogeneous with some vascularity seen within the anterior endometrium.   Right ovary: Not seen, no adnexal mass.   Left ovary: Not seen, no adnexal mass.   Cul-de-sac: No significant free fluid.       IMPRESSION:   Thickened endometrium with vascularity anteriorly suggesting retained products of conception.       A&P: Samantha Dudley is a 40 year old  POD#13 who presents as a ERNESTO from [facility] with retained products of conception after a RLTCS for failed TOLAC. VSS. NPO since yesterday evening. Plan for hysteroscopy, dilation and curettage this AM with likely discharge postprocedure. COVID, CBC, type and screen ordered. In terms of the right sided incisional pain, discussed that this is likely from where the knot is in the suture. Abdominal exam not concerning. Recommended that she remove the steristips since she is POD#13. Breast pump ordered.     Discussed with Dr. Óscar Licea MD  OB/GYN PGY-3  21 6:42 AM

## 2021-07-14 NOTE — PLAN OF CARE
"  VS: Blood pressure 134/53, pulse 75, temperature 99  F (37.2  C), temperature source Oral, resp. rate 16, SpO2 100 %, unknown if currently breastfeeding.  Denies SOB, CP   O2: RA. LS CTA. Daily inhaler used independently by pt. Pt reports \"tightness\" in chest while deep breathing present since c section with no change   Output: Voids spontaneous in BR w/o difficulty  Vaginal bleeding bright red, changed lawrence pad x2 this shift   Last BM: 7/13, taking senna   Activity: Up with SBA, steady on feet   Skin: Intact ex c section incision   Pain: C/o pain to incision, PRN tylenol given   CMS: Intact. AxO. Denies N/T   Dressing: Steri strips in place, JULI   Diet: Regular, tolerating   LDA: R PIV infusing   Equipment: IV pole. Breast pump   Plan: Discharge once obs goals met   Additional Info: Baby in NICU on floor 11      OBS GOALS:    1200:  -vital signs normal or at patient baseline: met  -tolerating oral intake to maintain hydration: met  -adequate pain control on oral analgesics: met  -returns to baseline functional status: met  -light vaginal bleeding: met  "

## 2021-07-14 NOTE — OP NOTE
Hysteroscopy with Suction D&C    Date of Surgery: 21     Surgeon: Nini Gallagher MD    Assistants:   Keyonna Mullen MD PGY-4  Mauricio Fontana MD PGY-1    Pre-operative Diagnoses: Possible retained products of conception, incisional pain  Post-operative Diagnoses: Same as above, s/p procedure below    Procedure: Hysteroscopy with dilation and curettage of uterus using morcellator converted to suction D&C, trigger point injections    Anesthesia: MAC, paracervical block    EBL: 50cc  IVF: 300cc  UOP: not measured  Fluid deficit:  680mL    Complications: None  Findings: EUA revealed 12 week sized anteverted mobile uterus, no adnexal masses palpated. Retained products of conception noted inside the uterus with shaggy endometrium noted throughout.  Specimens: Endometrial curettings    Indications:  Samantha Dudley is a 40 year old  at 40w1d retained products of conception after a RLTCS for failed TOLAC.  Risks, benefits, and alternatives to the procedure were discussed with the patient who elected to proceed.  All questions were answered and an informed consent was obtained.    Procedure:  The patient was taken to the operating room where she underwent MAC anesthesia without difficulty.  She was placed in a dorsal lithotomy position using yellow fin stirrups.  The patient was examined for the above noted findings and then prepped and draped in the usual sterile fashion.  A medium graves speculum was inserted into the vagina.  A tenaculum was placed on the anterior cervical lip. A paracervical block was performed using 20mL of 1% lidocaine without epi. The endocervical canal was serially dilated to 6 mm using Hegar dilators. The hysteroscope was inserted without difficulty with the above findings noted.  Visualization was achieved using normal saline as a distending medium. The Myosure device was inserted and used to morcellate the retained products of conception noted above. Due to the large amount of  abnormal tissue conversion to suction D&C was required. The hysteroscope was removed with the Myosure device.      Dilation of the cervix was continued to 8 mm with Hegar dilators. A 8 mm curved suction curette was used to empty the contents of the uterus. This was sent to pathology. Instruments were then removed. The tenaculum was removed from the cervix and the puncture sites were hemostatic.  There was some excess bleeding from the cervical os. Uterine massage was performed and methergine was administered. Bleeding was improved and appropriate after these measures.    Trigger point injections were performed at the right side of her previous Pfannenstiel incision with 0.25% bupivacaine and kenalog for a total of 10 mL.     The patient was repositioned to the supine position.  The patient tolerated the procedure well and was taken to the recovery room in stable condition.  Dr. Gallagher was present for the entire procedure.    Mauricio Fontana MD  Ob/Gyn Resident, PGY-1  07/14/21 11:00 AM    Staff:  I was scrubbed and present for entire case and agree w/ above note.    Nini Gallagher MD

## 2021-07-14 NOTE — DISCHARGE SUMMARY
[facility]  Gynecology Discharge Summary    Admission Date:  2021  Discharge Date:  2021    Admission Attending: Lynette Cantrell MD  Discharge Attending: Nini Gallagher MD    Admission Diagnosis:  - Vaginal bleeding  - Suspected retained products of conception  - POD#13 s/p  section  - Incisional pain    Discharge Diagnosis:  - Same as above, s/p below stated procedure    Procedures Performed:  - Hysteroscopy, dilation and curettage with Myosure device, suction dilation and curettage, abdominal/incisional trigger point injections    Admission History:  Samantha Dudley is a 40 year old  who presented as a transfer of care from [facility] for vaginal bleeding in the setting of retained products of conception after  section for failed TOLAC. Surgical management with hysteroscopy and D&C was recommended. She also complained of persistent pain near the right apex of her prior abdominal incision and trigger point injections were recommended given a focal area of pain around the right apex of her fascial closure. The risks, benefits, and alternatives of surgery were discussed, and she agreed to proceed.     Operative Course:  She underwent the above stated procedure, which was uncomplicated. EBL was 50 cc.    Operative Findings:   EUA revealed 12 week sized anteverted mobile uterus, no adnexal masses palpated. Retained products of conception noted inside the uterus with shaggy endometrium noted throughout, converted to suction dilation and curettage due to difficulty with visualization during hysteroscopy. Brisk bleeding from os after uterine evacuation completed, bimanual massage performed and methergine and TXA given with improvement in bleeding. Injection of Kenalog and bupivacaine at trigger points performed at end of case.    Hospital Course:  Her postoperative course was uncomplicated. On POD#0, diet was advanced and she was transitioned to PO  pain medications. On POD#0 she was tolerating regular diet, ambulating without difficulty, voiding spontaneously, and controlling pain with PO medications, and she was deemed stable for discharge. Hemoglobin at the time of discharge was 11.4.    Discharge Plans:  - The patient was discharged to home  - PO Activity:   - As tolerated, no tub baths until bleeding stops  - She was instructed [place] [facility] [place] if she has any of the following:    - Temperature greater than 100.4F   - Pain not controlled by pain medications   - Uncontrolled nausea/vomiting   - Foul-smelling vaginal discharge   - Vaginal bleeding soaking 1 pad per hour for 2 hours in a row  - She will follow up with Dr. Gallagher in 2-4 weeks    - Discharge medications include:  -    Samantha Dudley   Home Medication Instructions CHAPIN:[id number]    Printed on:07/14/21 1230     Medication Information                        acetaminophen (TYLENOL) 325 MG tablet  Take 3 tablets (975 mg) by mouth every 6 hours             albuterol (PROAIR HFA/PROVENTIL HFA/VENTOLIN HFA) 108 (90 Base) MCG/ACT inhaler  albuterol sulfate HFA 90 mcg/actuation aerosol inhaler             doxylamine (UNISOM) 25 MG TABS tablet  Take 25 mg by mouth At Bedtime             FLUoxetine (PROZAC) 10 MG capsule  Take 30 mg by mouth daily.             hydrOXYzine (VISTARIL) 25 MG capsule  Take 1 capsule (25 mg) by mouth nightly as needed for itching or other (sleep)             ibuprofen (ADVIL/MOTRIN) 600 MG tablet  Take 1 tablet (600 mg) by mouth every 6 hours             magnesium 250 MG tablet  Take 1 tablet by mouth daily             mometasone (ASMANEX TWISTHALER) 220 MCG/INH inhaler  Inhale into the lungs every evening             multivitamin w/minerals (MULTI-VITAMIN) tablet  Take 1 tablet by mouth daily             Omega-3 Fatty Acids (FISH OIL PO)  Take by mouth daily             oxyCODONE (ROXICODONE) 5 MG tablet  Take 1 tablet (5 mg) by mouth every 4 hours as needed for  severe pain             polyethylene glycol (MIRALAX) 17 GM/Dose powder  Take 17 g by mouth daily             Prenatal Vit-Fe Fumarate-FA (PRENATAL MULTIVITAMIN W/IRON) 27-0.8 MG tablet  Take 1 tablet by mouth daily             senna-docusate (SENOKOT-S/PERICOLACE) 8.6-50 MG tablet  Take 2 tablets by mouth 2 times daily             simethicone (MYLICON) 80 MG chewable tablet  Take 1 tablet (80 mg) by mouth 4 times daily as needed for other (gas)                 Keyonna Mullen MD  Ob/Gyn Resident, PGY-4  07/14/21 2:35 PM     [facility] staff:  Appreciate note by Dr. Mullen.  I have seen and examined the patient     Nini Gallagher MD, FACOG  7/16/2021  5:01 PM

## 2021-07-14 NOTE — TELEPHONE ENCOUNTER
"C/s on 7/1. Today, feeling a lot of pain (pulling/yanking) on the right side, and during pumping, had a gush of blood which stopped on its own. This occurred again tonight, pain with pumping, felt \"gushing\" blood for several minutes. No fever. Currently in the ER at [facility] in [place].    Agreed with her assessment to be evaluated in the ER. S office will call in the morning to check on her and provide any followup care she may need.    Lynette Cantrell MD, MSCI    [facility]/OBGYN  "

## 2021-07-15 ENCOUNTER — TELEPHONE (OUTPATIENT)
Dept: OBGYN | Facility: CLINIC | Age: 41
End: 2021-07-15

## 2021-07-15 DIAGNOSIS — Z98.891 S/P CESAREAN SECTION: Primary | ICD-10-CM

## 2021-07-15 DIAGNOSIS — R52 PAIN: ICD-10-CM

## 2021-07-15 RX ORDER — GABAPENTIN 300 MG/1
300 CAPSULE ORAL 3 TIMES DAILY PRN
Qty: 15 CAPSULE | Refills: 0 | Status: ON HOLD | OUTPATIENT
Start: 2021-07-15 | End: 2021-07-21

## 2021-07-15 RX ORDER — GABAPENTIN 300 MG/1
300 CAPSULE ORAL 3 TIMES DAILY
Qty: 15 CAPSULE | Refills: 0 | Status: SHIPPED | OUTPATIENT
Start: 2021-07-15 | End: 2021-07-15

## 2021-07-15 NOTE — TELEPHONE ENCOUNTER
Call received from patient reporting severe right sided incisional pain 9/10. Pt delivered by c/s 7/1/21. Is post op day #1 from D&C d/t [facility].     Pt states that she has had this pain since her c/s. Has increased in intensity over the last few days. Pt states that she was given a block to help with the pain yesterday but she has not noticed any improvement. Denies s/s of infection, states vaginal bleeding is minimal. Pt states she has been taking Tylenol and ibuprofen every 6 hours with minimal relief. Has not taken oxycodone due to side effects.    Spoke with Dr. Mcdonald, recommends trying oxycodone and if pt is adverse, could try gabapentin 300mg TID.     Informed patient of Dr. Mcdonald's recommendation. Pt agreed to trying gabapentin, would like to avoid oxycodone. Advised to try gabapentin and if little or no relief to try oxycodone. Pt verbalized understanding. Encouraged her to reach out with any questions or concerns. She verbalized understanding and denied further questions/concerns.

## 2021-07-17 ENCOUNTER — HOSPITAL ENCOUNTER (INPATIENT)
Facility: CLINIC | Age: 41
LOS: 3 days | Discharge: HOME | DRG: 769 | End: 2021-07-21
Attending: EMERGENCY MEDICINE | Admitting: OBSTETRICS & GYNECOLOGY
Payer: COMMERCIAL

## 2021-07-17 DIAGNOSIS — Z78.9 ACTIVE BLEEDING: ICD-10-CM

## 2021-07-17 DIAGNOSIS — Z11.52 ENCOUNTER FOR SCREENING LABORATORY TESTING FOR SEVERE ACUTE RESPIRATORY SYNDROME CORONAVIRUS 2 (SARS-COV-2): ICD-10-CM

## 2021-07-17 DIAGNOSIS — R10.31 ABDOMINAL PAIN, RIGHT LOWER QUADRANT: ICD-10-CM

## 2021-07-17 DIAGNOSIS — Z98.891 S/P CESAREAN SECTION: ICD-10-CM

## 2021-07-17 PROCEDURE — 99285 EMERGENCY DEPT VISIT HI MDM: CPT | Mod: 25 | Performed by: EMERGENCY MEDICINE

## 2021-07-17 PROCEDURE — 96361 HYDRATE IV INFUSION ADD-ON: CPT | Performed by: EMERGENCY MEDICINE

## 2021-07-17 PROCEDURE — C9803 HOPD COVID-19 SPEC COLLECT: HCPCS | Performed by: EMERGENCY MEDICINE

## 2021-07-17 PROCEDURE — 87086 URINE CULTURE/COLONY COUNT: CPT | Performed by: EMERGENCY MEDICINE

## 2021-07-17 PROCEDURE — 99291 CRITICAL CARE FIRST HOUR: CPT | Performed by: EMERGENCY MEDICINE

## 2021-07-17 PROCEDURE — 96360 HYDRATION IV INFUSION INIT: CPT | Performed by: EMERGENCY MEDICINE

## 2021-07-17 PROCEDURE — 81001 URINALYSIS AUTO W/SCOPE: CPT | Performed by: EMERGENCY MEDICINE

## 2021-07-17 NOTE — TELEPHONE ENCOUNTER
"Returned answering service call for \"pain on right side\".    Had C/s 7/1, D&C for possible rPOC on 7/14/21 who calls today with concerns for increasing right sided pain.  She says she has had a lot of right angle pain, improved after gabapentin added.  But now with \"higher\" ab pain and states she's \"a little bit concerned about an appendicitis\", states she \"feels like there's a blockage\", when gas passes through really hurts but then alleviated somewhat after passes.  Feels hungry, no nausea/vomiting, no fever, did have small bowel movement this AM, passing gas.  Using colace, miralax, prune juice, senna and ibuprofen without improvement.  Recommended eval now, with possible CT but Samantha would like to see how rest of the day goes.  Encouraged presentation immediately if fever or if worsens.    Rashmi Rader MD MPH     "

## 2021-07-18 ENCOUNTER — APPOINTMENT (OUTPATIENT)
Dept: INTERVENTIONAL RADIOLOGY/VASCULAR | Facility: CLINIC | Age: 41
DRG: 769 | End: 2021-07-18
Payer: COMMERCIAL

## 2021-07-18 ENCOUNTER — ANESTHESIA EVENT (OUTPATIENT)
Dept: SURGERY | Facility: CLINIC | Age: 41
DRG: 769 | End: 2021-07-18
Payer: COMMERCIAL

## 2021-07-18 ENCOUNTER — APPOINTMENT (OUTPATIENT)
Dept: CT IMAGING | Facility: CLINIC | Age: 41
DRG: 769 | End: 2021-07-18
Attending: EMERGENCY MEDICINE
Payer: COMMERCIAL

## 2021-07-18 ENCOUNTER — APPOINTMENT (OUTPATIENT)
Dept: ULTRASOUND IMAGING | Facility: CLINIC | Age: 41
DRG: 769 | End: 2021-07-18
Payer: COMMERCIAL

## 2021-07-18 PROBLEM — R10.31 ABDOMINAL PAIN, RIGHT LOWER QUADRANT: Status: ACTIVE | Noted: 2021-07-18

## 2021-07-18 PROBLEM — Z78.9 ACTIVE BLEEDING: Status: ACTIVE | Noted: 2021-07-18

## 2021-07-18 LAB
ALBUMIN SERPL-MCNC: 3.1 G/DL (ref 3.4–5)
ALBUMIN UR-MCNC: NEGATIVE MG/DL
ALP SERPL-CCNC: 102 U/L (ref 40–150)
ALT SERPL W P-5'-P-CCNC: 23 U/L (ref 0–50)
ANION GAP SERPL CALCULATED.3IONS-SCNC: 7 MMOL/L (ref 3–14)
APPEARANCE UR: CLEAR
AST SERPL W P-5'-P-CCNC: 24 U/L (ref 0–45)
B-HCG SERPL-ACNC: 280 IU/L (ref 0–5)
BACTERIA #/AREA URNS HPF: ABNORMAL /HPF
BASOPHILS # BLD AUTO: 0.1 10E3/UL (ref 0–0.2)
BASOPHILS NFR BLD AUTO: 1 %
BILIRUB SERPL-MCNC: 0.3 MG/DL (ref 0.2–1.3)
BILIRUB UR QL STRIP: NEGATIVE
BUN SERPL-MCNC: 18 MG/DL (ref 7–30)
CALCIUM SERPL-MCNC: 9.7 MG/DL (ref 8.5–10.1)
CHLORIDE BLD-SCNC: 104 MMOL/L (ref 94–109)
CO2 SERPL-SCNC: 24 MMOL/L (ref 20–32)
COLOR UR AUTO: ABNORMAL
CREAT SERPL-MCNC: 0.6 MG/DL (ref 0.52–1.04)
EOSINOPHIL # BLD AUTO: 0.1 10E3/UL (ref 0–0.7)
EOSINOPHIL NFR BLD AUTO: 2 %
ERYTHROCYTE [DISTWIDTH] IN BLOOD BY AUTOMATED COUNT: 12 % (ref 10–15)
GFR SERPL CREATININE-BSD FRML MDRD: >90 ML/MIN/1.73M2
GLUCOSE BLD-MCNC: 84 MG/DL (ref 70–99)
GLUCOSE UR STRIP-MCNC: NEGATIVE MG/DL
HCT VFR BLD AUTO: 33.1 % (ref 35–47)
HGB BLD-MCNC: 11.2 G/DL (ref 11.7–15.7)
HGB UR QL STRIP: ABNORMAL
IMM GRANULOCYTES # BLD: 0 10E3/UL
IMM GRANULOCYTES NFR BLD: 0 %
INR PPP: 0.98 (ref 0.86–1.14)
KETONES UR STRIP-MCNC: NEGATIVE MG/DL
LACTATE SERPL-SCNC: 0.5 MMOL/L (ref 0.7–2)
LEUKOCYTE ESTERASE UR QL STRIP: ABNORMAL
LIPASE SERPL-CCNC: 136 U/L (ref 73–393)
LYMPHOCYTES # BLD AUTO: 2.2 10E3/UL (ref 0.8–5.3)
LYMPHOCYTES NFR BLD AUTO: 31 %
MCH RBC QN AUTO: 35.2 PG (ref 26.5–33)
MCHC RBC AUTO-ENTMCNC: 33.8 G/DL (ref 31.5–36.5)
MCV RBC AUTO: 104 FL (ref 78–100)
MONOCYTES # BLD AUTO: 0.4 10E3/UL (ref 0–1.3)
MONOCYTES NFR BLD AUTO: 6 %
MUCOUS THREADS #/AREA URNS LPF: PRESENT /LPF
NEUTROPHILS # BLD AUTO: 4.4 10E3/UL (ref 1.6–8.3)
NEUTROPHILS NFR BLD AUTO: 60 %
NITRATE UR QL: NEGATIVE
NRBC # BLD AUTO: 0 10E3/UL
NRBC BLD AUTO-RTO: 0 /100
PH UR STRIP: 7 [PH] (ref 5–7)
PLATELET # BLD AUTO: 337 10E3/UL (ref 150–450)
POTASSIUM BLD-SCNC: 4.2 MMOL/L (ref 3.4–5.3)
PROT SERPL-MCNC: 6.6 G/DL (ref 6.8–8.8)
RBC # BLD AUTO: 3.18 10E6/UL (ref 3.8–5.2)
RBC URINE: 10 /HPF
SARS-COV-2 RNA RESP QL NAA+PROBE: NEGATIVE
SODIUM SERPL-SCNC: 135 MMOL/L (ref 133–144)
SP GR UR STRIP: 1.02 (ref 1–1.03)
SQUAMOUS EPITHELIAL: 1 /HPF
TRANSITIONAL EPI: <1 /HPF
UROBILINOGEN UR STRIP-MCNC: NORMAL MG/DL
WBC # BLD AUTO: 7.3 10E3/UL (ref 4–11)
WBC URINE: 13 /HPF

## 2021-07-18 PROCEDURE — U0005 INFEC AGEN DETEC AMPLI PROBE: HCPCS | Performed by: EMERGENCY MEDICINE

## 2021-07-18 PROCEDURE — C1769 GUIDE WIRE: HCPCS

## 2021-07-18 PROCEDURE — C1887 CATHETER, GUIDING: HCPCS

## 2021-07-18 PROCEDURE — 76856 US EXAM PELVIC COMPLETE: CPT | Mod: 26 | Performed by: RADIOLOGY

## 2021-07-18 PROCEDURE — 76937 US GUIDE VASCULAR ACCESS: CPT | Mod: 26 | Performed by: RADIOLOGY

## 2021-07-18 PROCEDURE — 258N000003 HC RX IP 258 OP 636: Performed by: STUDENT IN AN ORGANIZED HEALTH CARE EDUCATION/TRAINING PROGRAM

## 2021-07-18 PROCEDURE — 250N000013 HC RX MED GY IP 250 OP 250 PS 637: Performed by: STUDENT IN AN ORGANIZED HEALTH CARE EDUCATION/TRAINING PROGRAM

## 2021-07-18 PROCEDURE — C1760 CLOSURE DEV, VASC: HCPCS

## 2021-07-18 PROCEDURE — 37244 VASC EMBOLIZE/OCCLUDE BLEED: CPT | Mod: GC | Performed by: RADIOLOGY

## 2021-07-18 PROCEDURE — 120N000002 HC R&B MED SURG/OB UMMC

## 2021-07-18 PROCEDURE — 83605 ASSAY OF LACTIC ACID: CPT | Performed by: EMERGENCY MEDICINE

## 2021-07-18 PROCEDURE — 37244 VASC EMBOLIZE/OCCLUDE BLEED: CPT

## 2021-07-18 PROCEDURE — 250N000009 HC RX 250: Performed by: EMERGENCY MEDICINE

## 2021-07-18 PROCEDURE — 36247 INS CATH ABD/L-EXT ART 3RD: CPT | Mod: XS | Performed by: RADIOLOGY

## 2021-07-18 PROCEDURE — 258N000003 HC RX IP 258 OP 636: Performed by: EMERGENCY MEDICINE

## 2021-07-18 PROCEDURE — 250N000011 HC RX IP 250 OP 636: Performed by: RADIOLOGY

## 2021-07-18 PROCEDURE — 85610 PROTHROMBIN TIME: CPT | Performed by: EMERGENCY MEDICINE

## 2021-07-18 PROCEDURE — 250N000011 HC RX IP 250 OP 636: Performed by: OBSTETRICS & GYNECOLOGY

## 2021-07-18 PROCEDURE — 76830 TRANSVAGINAL US NON-OB: CPT | Mod: 26 | Performed by: RADIOLOGY

## 2021-07-18 PROCEDURE — 84702 CHORIONIC GONADOTROPIN TEST: CPT | Performed by: EMERGENCY MEDICINE

## 2021-07-18 PROCEDURE — 85025 COMPLETE CBC W/AUTO DIFF WBC: CPT | Performed by: EMERGENCY MEDICINE

## 2021-07-18 PROCEDURE — 76830 TRANSVAGINAL US NON-OB: CPT

## 2021-07-18 PROCEDURE — 80053 COMPREHEN METABOLIC PANEL: CPT | Performed by: EMERGENCY MEDICINE

## 2021-07-18 PROCEDURE — 99152 MOD SED SAME PHYS/QHP 5/>YRS: CPT | Mod: GC | Performed by: RADIOLOGY

## 2021-07-18 PROCEDURE — 255N000002 HC RX 255 OP 636: Performed by: RADIOLOGY

## 2021-07-18 PROCEDURE — 36592 COLLECT BLOOD FROM PICC: CPT | Performed by: EMERGENCY MEDICINE

## 2021-07-18 PROCEDURE — 272N000408

## 2021-07-18 PROCEDURE — 250N000011 HC RX IP 250 OP 636: Performed by: STUDENT IN AN ORGANIZED HEALTH CARE EDUCATION/TRAINING PROGRAM

## 2021-07-18 PROCEDURE — 36247 INS CATH ABD/L-EXT ART 3RD: CPT | Mod: RT | Performed by: RADIOLOGY

## 2021-07-18 PROCEDURE — 250N000013 HC RX MED GY IP 250 OP 250 PS 637: Performed by: EMERGENCY MEDICINE

## 2021-07-18 PROCEDURE — 36247 INS CATH ABD/L-EXT ART 3RD: CPT | Mod: 50

## 2021-07-18 PROCEDURE — 250N000009 HC RX 250: Performed by: RADIOLOGY

## 2021-07-18 PROCEDURE — 75736 ARTERY X-RAYS PELVIS: CPT

## 2021-07-18 PROCEDURE — 04LE3DT OCCLUSION OF RIGHT UTERINE ARTERY WITH INTRALUMINAL DEVICE, PERCUTANEOUS APPROACH: ICD-10-PCS | Performed by: RADIOLOGY

## 2021-07-18 PROCEDURE — 83690 ASSAY OF LIPASE: CPT | Performed by: EMERGENCY MEDICINE

## 2021-07-18 PROCEDURE — 74177 CT ABD & PELVIS W/CONTRAST: CPT

## 2021-07-18 PROCEDURE — 250N000011 HC RX IP 250 OP 636: Performed by: EMERGENCY MEDICINE

## 2021-07-18 RX ORDER — IOPAMIDOL 755 MG/ML
100 INJECTION, SOLUTION INTRAVASCULAR ONCE
Status: COMPLETED | OUTPATIENT
Start: 2021-07-18 | End: 2021-07-18

## 2021-07-18 RX ORDER — NITROGLYCERIN 5 MG/ML
100-500 VIAL (ML) INTRAVENOUS
Status: DISCONTINUED | OUTPATIENT
Start: 2021-07-18 | End: 2021-07-18

## 2021-07-18 RX ORDER — NALOXONE HYDROCHLORIDE 0.4 MG/ML
0.4 INJECTION, SOLUTION INTRAMUSCULAR; INTRAVENOUS; SUBCUTANEOUS
Status: DISCONTINUED | OUTPATIENT
Start: 2021-07-18 | End: 2021-07-18

## 2021-07-18 RX ORDER — HYDROXYZINE HYDROCHLORIDE 50 MG/1
50 TABLET, FILM COATED ORAL EVERY 6 HOURS PRN
Status: DISCONTINUED | OUTPATIENT
Start: 2021-07-18 | End: 2021-07-21 | Stop reason: HOSPADM

## 2021-07-18 RX ORDER — FLUMAZENIL 0.1 MG/ML
0.2 INJECTION, SOLUTION INTRAVENOUS
Status: DISCONTINUED | OUTPATIENT
Start: 2021-07-18 | End: 2021-07-18 | Stop reason: CLARIF

## 2021-07-18 RX ORDER — KETOROLAC TROMETHAMINE 30 MG/ML
30 INJECTION, SOLUTION INTRAMUSCULAR; INTRAVENOUS EVERY 6 HOURS PRN
Status: DISCONTINUED | OUTPATIENT
Start: 2021-07-18 | End: 2021-07-21

## 2021-07-18 RX ORDER — HYDROMORPHONE HCL IN WATER/PF 6 MG/30 ML
0.2 PATIENT CONTROLLED ANALGESIA SYRINGE INTRAVENOUS
Status: DISCONTINUED | OUTPATIENT
Start: 2021-07-18 | End: 2021-07-18

## 2021-07-18 RX ORDER — ONDANSETRON 2 MG/ML
4 INJECTION INTRAMUSCULAR; INTRAVENOUS EVERY 6 HOURS PRN
Status: DISCONTINUED | OUTPATIENT
Start: 2021-07-18 | End: 2021-07-21 | Stop reason: HOSPADM

## 2021-07-18 RX ORDER — NALOXONE HYDROCHLORIDE 0.4 MG/ML
0.2 INJECTION, SOLUTION INTRAMUSCULAR; INTRAVENOUS; SUBCUTANEOUS
Status: DISCONTINUED | OUTPATIENT
Start: 2021-07-18 | End: 2021-07-21 | Stop reason: HOSPADM

## 2021-07-18 RX ORDER — DOCUSATE SODIUM 100 MG/1
100 CAPSULE, LIQUID FILLED ORAL 2 TIMES DAILY
Status: DISCONTINUED | OUTPATIENT
Start: 2021-07-18 | End: 2021-07-18

## 2021-07-18 RX ORDER — ONDANSETRON 4 MG/1
4 TABLET, ORALLY DISINTEGRATING ORAL EVERY 6 HOURS PRN
Status: DISCONTINUED | OUTPATIENT
Start: 2021-07-18 | End: 2021-07-21 | Stop reason: HOSPADM

## 2021-07-18 RX ORDER — CEFAZOLIN SODIUM 2 G/100ML
2 INJECTION, SOLUTION INTRAVENOUS
Status: COMPLETED | OUTPATIENT
Start: 2021-07-18 | End: 2021-07-18

## 2021-07-18 RX ORDER — DOCUSATE SODIUM 100 MG/1
100 CAPSULE, LIQUID FILLED ORAL 2 TIMES DAILY
Status: DISCONTINUED | OUTPATIENT
Start: 2021-07-18 | End: 2021-07-21 | Stop reason: HOSPADM

## 2021-07-18 RX ORDER — LIDOCAINE 40 MG/G
CREAM TOPICAL
Status: DISCONTINUED | OUTPATIENT
Start: 2021-07-18 | End: 2021-07-18

## 2021-07-18 RX ORDER — GABAPENTIN 300 MG/1
300 CAPSULE ORAL 3 TIMES DAILY
Status: DISCONTINUED | OUTPATIENT
Start: 2021-07-18 | End: 2021-07-18

## 2021-07-18 RX ORDER — OXYCODONE HYDROCHLORIDE 5 MG/1
5-10 TABLET ORAL EVERY 4 HOURS PRN
Status: DISCONTINUED | OUTPATIENT
Start: 2021-07-18 | End: 2021-07-21 | Stop reason: HOSPADM

## 2021-07-18 RX ORDER — ONDANSETRON 2 MG/ML
4 INJECTION INTRAMUSCULAR; INTRAVENOUS EVERY 6 HOURS PRN
Status: DISCONTINUED | OUTPATIENT
Start: 2021-07-18 | End: 2021-07-18

## 2021-07-18 RX ORDER — PROCHLORPERAZINE MALEATE 10 MG
10 TABLET ORAL EVERY 6 HOURS PRN
Status: DISCONTINUED | OUTPATIENT
Start: 2021-07-18 | End: 2021-07-21 | Stop reason: HOSPADM

## 2021-07-18 RX ORDER — ACETAMINOPHEN 325 MG/1
650 TABLET ORAL EVERY 6 HOURS PRN
Status: DISCONTINUED | OUTPATIENT
Start: 2021-07-18 | End: 2021-07-20

## 2021-07-18 RX ORDER — PROCHLORPERAZINE 25 MG
25 SUPPOSITORY, RECTAL RECTAL EVERY 12 HOURS PRN
Status: DISCONTINUED | OUTPATIENT
Start: 2021-07-18 | End: 2021-07-21 | Stop reason: HOSPADM

## 2021-07-18 RX ORDER — SODIUM CHLORIDE 9 MG/ML
INJECTION, SOLUTION INTRAVENOUS CONTINUOUS
Status: DISCONTINUED | OUTPATIENT
Start: 2021-07-18 | End: 2021-07-21

## 2021-07-18 RX ORDER — DIPHENHYDRAMINE HYDROCHLORIDE 50 MG/ML
25 INJECTION INTRAMUSCULAR; INTRAVENOUS ONCE
Status: DISCONTINUED | OUTPATIENT
Start: 2021-07-18 | End: 2021-07-18

## 2021-07-18 RX ORDER — HYDROXYZINE HYDROCHLORIDE 25 MG/1
25 TABLET, FILM COATED ORAL EVERY 6 HOURS PRN
Status: DISCONTINUED | OUTPATIENT
Start: 2021-07-18 | End: 2021-07-21 | Stop reason: HOSPADM

## 2021-07-18 RX ORDER — HYDROMORPHONE HCL IN WATER/PF 6 MG/30 ML
0.4 PATIENT CONTROLLED ANALGESIA SYRINGE INTRAVENOUS
Status: DISCONTINUED | OUTPATIENT
Start: 2021-07-18 | End: 2021-07-21

## 2021-07-18 RX ORDER — PROCHLORPERAZINE 25 MG
25 SUPPOSITORY, RECTAL RECTAL EVERY 12 HOURS PRN
Status: DISCONTINUED | OUTPATIENT
Start: 2021-07-18 | End: 2021-07-18

## 2021-07-18 RX ORDER — ONDANSETRON 4 MG/1
4 TABLET, ORALLY DISINTEGRATING ORAL EVERY 6 HOURS PRN
Status: DISCONTINUED | OUTPATIENT
Start: 2021-07-18 | End: 2021-07-18

## 2021-07-18 RX ORDER — IODIXANOL 320 MG/ML
1-150 INJECTION, SOLUTION INTRAVASCULAR ONCE
Status: COMPLETED | OUTPATIENT
Start: 2021-07-18 | End: 2021-07-18

## 2021-07-18 RX ORDER — SODIUM CHLORIDE, SODIUM LACTATE, POTASSIUM CHLORIDE, CALCIUM CHLORIDE 600; 310; 30; 20 MG/100ML; MG/100ML; MG/100ML; MG/100ML
INJECTION, SOLUTION INTRAVENOUS CONTINUOUS
Status: DISCONTINUED | OUTPATIENT
Start: 2021-07-18 | End: 2021-07-21

## 2021-07-18 RX ORDER — OXYCODONE HYDROCHLORIDE 5 MG/1
5 TABLET ORAL EVERY 4 HOURS PRN
Status: DISCONTINUED | OUTPATIENT
Start: 2021-07-18 | End: 2021-07-18

## 2021-07-18 RX ORDER — HEPARIN SODIUM 200 [USP'U]/100ML
1 INJECTION, SOLUTION INTRAVENOUS CONTINUOUS PRN
Status: DISCONTINUED | OUTPATIENT
Start: 2021-07-18 | End: 2021-07-18

## 2021-07-18 RX ORDER — ACETAMINOPHEN 650 MG/1
650 SUPPOSITORY RECTAL EVERY 6 HOURS PRN
Status: DISCONTINUED | OUTPATIENT
Start: 2021-07-18 | End: 2021-07-20

## 2021-07-18 RX ORDER — GABAPENTIN 300 MG/1
300 CAPSULE ORAL 3 TIMES DAILY PRN
Status: DISCONTINUED | OUTPATIENT
Start: 2021-07-18 | End: 2021-07-21 | Stop reason: HOSPADM

## 2021-07-18 RX ORDER — NALOXONE HYDROCHLORIDE 0.4 MG/ML
0.4 INJECTION, SOLUTION INTRAMUSCULAR; INTRAVENOUS; SUBCUTANEOUS
Status: DISCONTINUED | OUTPATIENT
Start: 2021-07-18 | End: 2021-07-21 | Stop reason: HOSPADM

## 2021-07-18 RX ORDER — IBUPROFEN 600 MG/1
600 TABLET, FILM COATED ORAL EVERY 6 HOURS PRN
Status: DISCONTINUED | OUTPATIENT
Start: 2021-07-18 | End: 2021-07-21 | Stop reason: HOSPADM

## 2021-07-18 RX ORDER — PROCHLORPERAZINE MALEATE 5 MG
10 TABLET ORAL EVERY 6 HOURS PRN
Status: DISCONTINUED | OUTPATIENT
Start: 2021-07-18 | End: 2021-07-18

## 2021-07-18 RX ORDER — FENTANYL CITRATE 50 UG/ML
25-50 INJECTION, SOLUTION INTRAMUSCULAR; INTRAVENOUS EVERY 5 MIN PRN
Status: DISCONTINUED | OUTPATIENT
Start: 2021-07-18 | End: 2021-07-18

## 2021-07-18 RX ORDER — LIDOCAINE 40 MG/G
CREAM TOPICAL
Status: DISCONTINUED | OUTPATIENT
Start: 2021-07-18 | End: 2021-07-21 | Stop reason: HOSPADM

## 2021-07-18 RX ORDER — NALOXONE HYDROCHLORIDE 0.4 MG/ML
0.2 INJECTION, SOLUTION INTRAMUSCULAR; INTRAVENOUS; SUBCUTANEOUS
Status: DISCONTINUED | OUTPATIENT
Start: 2021-07-18 | End: 2021-07-18

## 2021-07-18 RX ORDER — KETOROLAC TROMETHAMINE 30 MG/ML
30 INJECTION, SOLUTION INTRAMUSCULAR; INTRAVENOUS ONCE
Status: COMPLETED | OUTPATIENT
Start: 2021-07-18 | End: 2021-07-18

## 2021-07-18 RX ADMIN — DOCUSATE SODIUM 100 MG: 100 CAPSULE, LIQUID FILLED ORAL at 19:45

## 2021-07-18 RX ADMIN — DIPHENHYDRAMINE HYDROCHLORIDE 25 MG: 50 INJECTION, SOLUTION INTRAMUSCULAR; INTRAVENOUS at 14:13

## 2021-07-18 RX ADMIN — FENTANYL CITRATE 25 MCG: 50 INJECTION INTRAMUSCULAR; INTRAVENOUS at 16:45

## 2021-07-18 RX ADMIN — LIDOCAINE HYDROCHLORIDE 8 ML: 10 INJECTION, SOLUTION EPIDURAL; INFILTRATION; INTRACAUDAL; PERINEURAL at 13:15

## 2021-07-18 RX ADMIN — GABAPENTIN 300 MG: 300 CAPSULE ORAL at 19:45

## 2021-07-18 RX ADMIN — FENTANYL CITRATE 25 MCG: 50 INJECTION INTRAMUSCULAR; INTRAVENOUS at 12:44

## 2021-07-18 RX ADMIN — NITROGLYCERIN 100 MCG: 5 INJECTION, SOLUTION INTRAVENOUS at 16:42

## 2021-07-18 RX ADMIN — MIDAZOLAM 0.5 MG: 1 INJECTION INTRAMUSCULAR; INTRAVENOUS at 14:07

## 2021-07-18 RX ADMIN — HYDROMORPHONE HYDROCHLORIDE 0.4 MG: 0.2 INJECTION, SOLUTION INTRAMUSCULAR; INTRAVENOUS; SUBCUTANEOUS at 23:07

## 2021-07-18 RX ADMIN — FENTANYL CITRATE 25 MCG: 50 INJECTION INTRAMUSCULAR; INTRAVENOUS at 12:39

## 2021-07-18 RX ADMIN — ONDANSETRON 4 MG: 2 INJECTION INTRAMUSCULAR; INTRAVENOUS at 12:31

## 2021-07-18 RX ADMIN — ACETAMINOPHEN 650 MG: 325 TABLET, FILM COATED ORAL at 18:28

## 2021-07-18 RX ADMIN — MIDAZOLAM 1 MG: 1 INJECTION INTRAMUSCULAR; INTRAVENOUS at 13:17

## 2021-07-18 RX ADMIN — NITROGLYCERIN 100 MCG: 5 INJECTION, SOLUTION INTRAVENOUS at 16:30

## 2021-07-18 RX ADMIN — SODIUM CHLORIDE, POTASSIUM CHLORIDE, SODIUM LACTATE AND CALCIUM CHLORIDE: 600; 310; 30; 20 INJECTION, SOLUTION INTRAVENOUS at 13:00

## 2021-07-18 RX ADMIN — SODIUM CHLORIDE: 9 INJECTION, SOLUTION INTRAVENOUS at 03:16

## 2021-07-18 RX ADMIN — FENTANYL CITRATE 25 MCG: 50 INJECTION INTRAMUSCULAR; INTRAVENOUS at 15:29

## 2021-07-18 RX ADMIN — OXYCODONE HYDROCHLORIDE 10 MG: 5 TABLET ORAL at 23:37

## 2021-07-18 RX ADMIN — FENTANYL CITRATE 25 MCG: 50 INJECTION INTRAMUSCULAR; INTRAVENOUS at 17:04

## 2021-07-18 RX ADMIN — DIPHENHYDRAMINE HYDROCHLORIDE 25 MG: 50 INJECTION, SOLUTION INTRAMUSCULAR; INTRAVENOUS at 13:06

## 2021-07-18 RX ADMIN — FENTANYL CITRATE 50 MCG: 50 INJECTION INTRAMUSCULAR; INTRAVENOUS at 15:02

## 2021-07-18 RX ADMIN — FENTANYL CITRATE 25 MCG: 50 INJECTION INTRAMUSCULAR; INTRAVENOUS at 14:15

## 2021-07-18 RX ADMIN — CEFAZOLIN SODIUM 2 G: 2 INJECTION, SOLUTION INTRAVENOUS at 11:20

## 2021-07-18 RX ADMIN — FENTANYL CITRATE 50 MCG: 50 INJECTION INTRAMUSCULAR; INTRAVENOUS at 17:18

## 2021-07-18 RX ADMIN — ONDANSETRON 4 MG: 4 TABLET, ORALLY DISINTEGRATING ORAL at 20:58

## 2021-07-18 RX ADMIN — HYDROMORPHONE HYDROCHLORIDE 0.2 MG: 0.2 INJECTION, SOLUTION INTRAMUSCULAR; INTRAVENOUS; SUBCUTANEOUS at 21:32

## 2021-07-18 RX ADMIN — IOPAMIDOL 75 ML: 755 INJECTION, SOLUTION INTRAVENOUS at 02:11

## 2021-07-18 RX ADMIN — SODIUM CHLORIDE 59 ML: 9 INJECTION, SOLUTION INTRAVENOUS at 02:11

## 2021-07-18 RX ADMIN — GABAPENTIN 300 MG: 300 CAPSULE ORAL at 07:35

## 2021-07-18 RX ADMIN — KETOROLAC TROMETHAMINE 30 MG: 30 INJECTION, SOLUTION INTRAMUSCULAR; INTRAVENOUS at 20:53

## 2021-07-18 RX ADMIN — MIDAZOLAM 0.5 MG: 1 INJECTION INTRAMUSCULAR; INTRAVENOUS at 16:26

## 2021-07-18 RX ADMIN — NITROGLYCERIN 100 MCG: 5 INJECTION, SOLUTION INTRAVENOUS at 16:55

## 2021-07-18 RX ADMIN — MIDAZOLAM 0.5 MG: 1 INJECTION INTRAMUSCULAR; INTRAVENOUS at 15:21

## 2021-07-18 RX ADMIN — FENTANYL CITRATE 25 MCG: 50 INJECTION INTRAMUSCULAR; INTRAVENOUS at 13:34

## 2021-07-18 RX ADMIN — MIDAZOLAM 2 MG: 1 INJECTION INTRAMUSCULAR; INTRAVENOUS at 12:31

## 2021-07-18 RX ADMIN — MIDAZOLAM 0.5 MG: 1 INJECTION INTRAMUSCULAR; INTRAVENOUS at 14:46

## 2021-07-18 RX ADMIN — FENTANYL CITRATE 25 MCG: 50 INJECTION INTRAMUSCULAR; INTRAVENOUS at 14:46

## 2021-07-18 RX ADMIN — SODIUM CHLORIDE, POTASSIUM CHLORIDE, SODIUM LACTATE AND CALCIUM CHLORIDE: 600; 310; 30; 20 INJECTION, SOLUTION INTRAVENOUS at 21:37

## 2021-07-18 RX ADMIN — MIDAZOLAM 0.5 MG: 1 INJECTION INTRAMUSCULAR; INTRAVENOUS at 15:44

## 2021-07-18 RX ADMIN — FENTANYL CITRATE 25 MCG: 50 INJECTION INTRAMUSCULAR; INTRAVENOUS at 16:25

## 2021-07-18 RX ADMIN — MIDAZOLAM 0.5 MG: 1 INJECTION INTRAMUSCULAR; INTRAVENOUS at 16:58

## 2021-07-18 RX ADMIN — FENTANYL CITRATE 25 MCG: 50 INJECTION INTRAMUSCULAR; INTRAVENOUS at 15:44

## 2021-07-18 RX ADMIN — OXYCODONE HYDROCHLORIDE 5 MG: 5 TABLET ORAL at 19:45

## 2021-07-18 RX ADMIN — MIDAZOLAM 0.5 MG: 1 INJECTION INTRAMUSCULAR; INTRAVENOUS at 13:35

## 2021-07-18 RX ADMIN — MIDAZOLAM 0.5 MG: 1 INJECTION INTRAMUSCULAR; INTRAVENOUS at 14:15

## 2021-07-18 RX ADMIN — FENTANYL CITRATE 25 MCG: 50 INJECTION INTRAMUSCULAR; INTRAVENOUS at 16:13

## 2021-07-18 RX ADMIN — MIDAZOLAM 0.5 MG: 1 INJECTION INTRAMUSCULAR; INTRAVENOUS at 12:39

## 2021-07-18 RX ADMIN — SODIUM CHLORIDE, POTASSIUM CHLORIDE, SODIUM LACTATE AND CALCIUM CHLORIDE: 600; 310; 30; 20 INJECTION, SOLUTION INTRAVENOUS at 10:59

## 2021-07-18 RX ADMIN — FENTANYL CITRATE 25 MCG: 50 INJECTION INTRAMUSCULAR; INTRAVENOUS at 14:06

## 2021-07-18 RX ADMIN — HEPARIN SODIUM 2 BAG: 200 INJECTION, SOLUTION INTRAVENOUS at 15:45

## 2021-07-18 RX ADMIN — FENTANYL CITRATE 50 MCG: 50 INJECTION INTRAMUSCULAR; INTRAVENOUS at 13:17

## 2021-07-18 RX ADMIN — FENTANYL CITRATE 25 MCG: 50 INJECTION INTRAMUSCULAR; INTRAVENOUS at 16:58

## 2021-07-18 RX ADMIN — FENTANYL CITRATE 25 MCG: 50 INJECTION INTRAMUSCULAR; INTRAVENOUS at 15:20

## 2021-07-18 RX ADMIN — IODIXANOL 250 ML: 320 INJECTION, SOLUTION INTRAVASCULAR at 16:59

## 2021-07-18 RX ADMIN — MIDAZOLAM 0.5 MG: 1 INJECTION INTRAMUSCULAR; INTRAVENOUS at 16:14

## 2021-07-18 RX ADMIN — PROCHLORPERAZINE EDISYLATE 10 MG: 5 INJECTION INTRAMUSCULAR; INTRAVENOUS at 23:16

## 2021-07-18 RX ADMIN — SODIUM CHLORIDE 1000 ML: 9 INJECTION, SOLUTION INTRAVENOUS at 00:42

## 2021-07-18 RX ADMIN — FENTANYL CITRATE 50 MCG: 50 INJECTION INTRAMUSCULAR; INTRAVENOUS at 12:31

## 2021-07-18 RX ADMIN — MIDAZOLAM 0.5 MG: 1 INJECTION INTRAMUSCULAR; INTRAVENOUS at 16:45

## 2021-07-18 RX ADMIN — FENTANYL CITRATE 25 MCG: 50 INJECTION INTRAMUSCULAR; INTRAVENOUS at 16:32

## 2021-07-18 RX ADMIN — MIDAZOLAM 0.5 MG: 1 INJECTION INTRAMUSCULAR; INTRAVENOUS at 15:02

## 2021-07-18 RX ADMIN — KETOROLAC TROMETHAMINE 30 MG: 30 INJECTION, SOLUTION INTRAMUSCULAR; INTRAVENOUS at 14:46

## 2021-07-18 RX ADMIN — MIDAZOLAM 0.5 MG: 1 INJECTION INTRAMUSCULAR; INTRAVENOUS at 12:44

## 2021-07-18 ASSESSMENT — ENCOUNTER SYMPTOMS
SHORTNESS OF BREATH: 0
BRUISES/BLEEDS EASILY: 0
HEADACHES: 0
COLOR CHANGE: 0
FEVER: 0
DIFFICULTY URINATING: 0
DYSURIA: 1
NECK STIFFNESS: 0
VOMITING: 0
ABDOMINAL PAIN: 1
ARTHRALGIAS: 0
EYE REDNESS: 0
CONFUSION: 0
NAUSEA: 0

## 2021-07-18 NOTE — PROCEDURES
[facility]    Procedure: IR Procedure Note    Date/Time: 7/18/2021 5:41 PM  Performed by: Catie Ledesma MD  Authorized by: Catie Ledesma MD   IR Fellow Physician: GRICELDA Ledesma MD.   Other(s) attending procedure: ALONDRA Maurice MD.     UNIVERSAL PROTOCOL   Site Marked: Yes  Prior Images Obtained and Reviewed:  Yes  Required items: Required blood products, implants, devices and special equipment available    Patient identity confirmed:  Verbally with patient, arm band, provided demographic data and hospital-assigned identification number  Patient was reevaluated immediately before administering moderate or deep sedation or anesthesia  Confirmation Checklist:  Patient's identity using two indicators, relevant allergies, procedure was appropriate and matched the consent or emergent situation and correct equipment/implants were available  Time out: Immediately prior to the procedure a time out was called    Universal Protocol: the Joint Commission Universal Protocol was followed    Preparation: Patient was prepped and draped in usual sterile fashion           ANESTHESIA    Anesthesia: Local infiltration  Local Anesthetic:  Lidocaine 1% without epinephrine      SEDATION    Patient Sedated: Yes    Sedation Type:  Moderate (conscious) sedation  Vital signs: Vital signs monitored during sedation    See dictated procedure note for full details.  Findings: -Abnormal appearing bilateral uterine arteries. No active extravasation was identified.    Specimens: none    Complications: None    Condition: Stable    Plan: -Resume inpatient care with OB.       PROCEDURE   Patient Tolerance:  Patient tolerated the procedure well with no immediate complications  Describe Procedure: -Angiogram of uterine arteries.   -Right uterine artery was embolized with gelfoam.    -Unable to safely embolized left uterine artery as it was branching off from the left ovarian artery.   Length of time physician/provider  present for 1:1 monitoring during sedation: 210

## 2021-07-18 NOTE — CONSULTS
INTERVENTIONAL RADIOLOGY CONSULT NOTE    Reason for referral:   Uterine artery embolization     History:   Samantha Dudley is a 40 year old  POD#17 s/p RLTCS for failed TOLAC, POD#4 s/p D&C for hematometra and retained POC who presented to the ER for worsening abdominal pain and vaginal bleeding. CT shows active extravasation within the uterus.     Plan:    Patient is on IR schedule for uterine artery emblization. Labs WNL for procedure. Continue NPO status. Consent will be done prior to procedure.    Labs:  Lab Results   Component Value Date    HGB 11.2 2021    HGB 10.8 2021     Lab Results   Component Value Date     2021     2021     Lab Results   Component Value Date    WBC 7.3 2021    WBC 9.3 2021       Lab Results   Component Value Date    INR 0.98 2021       Lab Results   Component Value Date    PROTTOTAL 6.6 2021      Lab Results   Component Value Date    ALBUMIN 3.1 2021     Lab Results   Component Value Date    BILITOTAL 0.3 2021     No results found for: BILICONJ   Lab Results   Component Value Date    ALKPHOS 102 2021     Lab Results   Component Value Date    AST 24 2021     Lab Results   Component Value Date    ALT 23 2021       Lab Results   Component Value Date    CR 0.60 2021    CR 0.8 2014     Lab Results   Component Value Date    BUN 18 2021    BUN 11.0 2014       Imaging:   CT demonstrates active extravasation in the uterine fundus. Bilateral uterine arteries look prominent with left appears significantly more dilated and abnormal.     If procedure has been approved, IR charge RN can be contacted at [phone] for estimated time of procedure.     Discussed with Dr. Maurice, OB and ER staffs.     Catie Ledesma MD.   Diagnostic/Interventional Radiology PGY-5  IR pass pager: [phone]    I agree with the assessment and plan documented by Dr. Ledesma.    Faye Maurice,  MD  Interventional Radiology   Pager [phone]

## 2021-07-18 NOTE — H&P
"Gynecology H&P Note    Patient Summary:    Umang Dudley   1980  [id number]    Chief Complaint: Abdominal pain    HPI:   Umang Dudley is a 39 yo  who presents POD#17 s/p RLTCS for failed TOLAC, POD#4 s/p D&C for hematometra/possible retained POC with abdominal pain.     She started feeling pain on right side yesterday afternoon, she has had a lot of right angle pain, improved after gabapentin added.  But now with \"higher\" ab pain and states she's \"a little bit concerned about an appendicitis\", states she \"feels like there's a blockage\", when gas passes through really hurts but then alleviated somewhat after passes.  Feels hungry, no nausea/vomiting, no fever, did have small bowel movement yesterday AM, passing gas. Using colace, miralax, prune juice, senna and ibuprofen without improvement.     She continued to have worsening sharp abdominal pain that took her breath away, therefore decided to present to ED. Vaginal bleeding has been minimal, decreasing. Denies fevers or chills.     OBHx:  OB History    Para Term  AB Living   2 2 2 0 0 2   SAB TAB Ectopic Multiple Live Births   0 0 0 0 2      # Outcome Date GA Lbr Sanchez/2nd Weight Sex Delivery Anes PTL Lv   2 Term 21 40w1d   F CS-LTranv Nitrous, EPI N KISHORE      Complications: Fetal Intolerance      Name: JASPER,FEMALE-UMANG      Apgar1: 3  Apgar5: 5   1 Term 10/14/16 39w0d   M -SEC EPI N KISHORE      Complications: Face presentation of fetus      Obstetric Comments   Arrived at 8cm, epidural, then face presentation, mec, CS.   PPH?, No GDM, HTN, --pp anxiety, therapist,  x 7       PMH:   Past Medical History:   Diagnosis Date     Asthma      Brachial neuritis 2011    resolved with P     Cervical dysplasia 2010    treated in [place], normal f/u paps     Eating disorder     previously on Prozac     Generalized anxiety disorder 2013    Problem resolved at pt request  that it be removed from problem " "list.      Hoarseness      Menarche age 14    cycles q mo x 7d, heavy, w cramps     Nasal congestion      Neck pain 2011     Oral contraceptive use age 18    for cycle control     Sore throat      Trouble breathing     At night       PSHx:   Past Surgical History:   Procedure Laterality Date      SECTION N/A 2021    Procedure:  SECTION;  Surgeon: Rina Mcdonald MD;  Location: [facility] L+D     CONIZATION LEEP      \"most painful thing I've ever had\"     DILATION AND CURETTAGE, OPERATIVE HYSTEROSCOPY WITH MORCELLATOR, COMBINED N/A 2021    Procedure: HYSTEROSCOPY, WITH SUCTION DILATION AND CURETTAGE OF UTERUS USING MORCELLATOR;  Surgeon: Nini Gallagher MD;  Location: [facility] OR     perianal lesion excision  child    scar on R @ 2:00 -- benign lesion      [place] teeth extraction  18       Medications:   Current Facility-Administered Medications   Medication     gabapentin (NEURONTIN) capsule 300 mg     heparin (PRESSURE BAG) 2 Units/mL 0.9% NaCl (1000 mL)     sodium chloride 0.9% infusion     Current Outpatient Medications   Medication     gabapentin (NEURONTIN) 300 MG capsule     acetaminophen (TYLENOL) 325 MG tablet     albuterol (PROAIR HFA/PROVENTIL HFA/VENTOLIN HFA) 108 (90 Base) MCG/ACT inhaler     doxylamine (UNISOM) 25 MG TABS tablet     FLUoxetine (PROZAC) 10 MG capsule     hydrOXYzine (VISTARIL) 25 MG capsule     ibuprofen (ADVIL/MOTRIN) 600 MG tablet     magnesium 250 MG tablet     mometasone (ASMANEX TWISTHALER) 220 MCG/INH inhaler     multivitamin w/minerals (MULTI-VITAMIN) tablet     Omega-3 Fatty Acids (FISH OIL PO)     oxyCODONE (ROXICODONE) 5 MG tablet     polyethylene glycol (MIRALAX) 17 GM/Dose powder     Prenatal Vit-Fe Fumarate-FA (PRENATAL MULTIVITAMIN W/IRON) 27-0.8 MG tablet     senna-docusate (SENOKOT-S/PERICOLACE) 8.6-50 MG tablet     simethicone (MYLICON) 80 MG chewable tablet     No current facility-administered medications on file prior to " encounter.  gabapentin (NEURONTIN) 300 MG capsule, Take 1 capsule (300 mg) by mouth 3 times daily as needed (incisional pain)  acetaminophen (TYLENOL) 325 MG tablet, Take 3 tablets (975 mg) by mouth every 6 hours  albuterol (PROAIR HFA/PROVENTIL HFA/VENTOLIN HFA) 108 (90 Base) MCG/ACT inhaler, albuterol sulfate HFA 90 mcg/actuation aerosol inhaler  doxylamine (UNISOM) 25 MG TABS tablet, Take 25 mg by mouth At Bedtime  FLUoxetine (PROZAC) 10 MG capsule, Take 30 mg by mouth daily.  hydrOXYzine (VISTARIL) 25 MG capsule, Take 1 capsule (25 mg) by mouth nightly as needed for itching or other (sleep)  ibuprofen (ADVIL/MOTRIN) 600 MG tablet, Take 1 tablet (600 mg) by mouth every 6 hours  magnesium 250 MG tablet, Take 1 tablet by mouth daily  mometasone (ASMANEX TWISTHALER) 220 MCG/INH inhaler, Inhale into the lungs every evening  multivitamin w/minerals (MULTI-VITAMIN) tablet, Take 1 tablet by mouth daily  Omega-3 Fatty Acids (FISH OIL PO), Take by mouth daily  oxyCODONE (ROXICODONE) 5 MG tablet, Take 1 tablet (5 mg) by mouth every 4 hours as needed for severe pain  polyethylene glycol (MIRALAX) 17 GM/Dose powder, Take 17 g by mouth daily  Prenatal Vit-Fe Fumarate-FA (PRENATAL MULTIVITAMIN W/IRON) 27-0.8 MG tablet, Take 1 tablet by mouth daily  senna-docusate (SENOKOT-S/PERICOLACE) 8.6-50 MG tablet, Take 2 tablets by mouth 2 times daily  simethicone (MYLICON) 80 MG chewable tablet, Take 1 tablet (80 mg) by mouth 4 times daily as needed for other (gas)      Allergies:   Allergies   Allergen Reactions     Other [No Clinical Screening - See Comments] Anaphylaxis     Triamenic cough syrup.  Per patients father when she was 5 y.o.     Cat Hair Extract Itching       Social History:   Social History     Socioeconomic History     Marital status:      Spouse name: kaushik     Number of children: 1     Years of education: Not on file     Highest education level: Not on file   Occupational History     Not on file   Tobacco Use      Smoking status: Never Smoker     Smokeless tobacco: Never Used   Substance and Sexual Activity     Alcohol use: Yes     Comment: 2-3 drinks 1x/wk max     Drug use: No     Sexual activity: Yes     Partners: Male     Birth control/protection: OCP   Other Topics Concern      Service No     Blood Transfusions No     Caffeine Concern No     Occupational Exposure No     Hobby Hazards No     Sleep Concern Yes     Comment: follows with psychiatry     Stress Concern Yes     Comment: broke off engagement last week     Weight Concern Yes     Comment: 7 # weight loss in 6 weeks     Special Diet Yes     Comment: adding supplement     Back Care No     Exercise Yes     Comment: tries to walk,     Bike Helmet Yes     Seat Belt Yes     Self-Exams No   Social History Narrative    Health Care Maintenance:    Last Pap:2/2011    Vaccinations:flu shot this year, tetanus up to date    TSH:not recent    Fasting glucose:hasn't had    Lipids:hasn't had    Mammogram:n/a    Colonoscopy:n/a    Dexa:n/a        How much exercise per week? Once a week, walk/run, sporting aerobic    How much calcium per day? Glass a milk a day       How much caffeine per day? none    How much vitamin D per day? Don't take it, multivitamin, whatever the sun gives her    Do you/your family wear seatbelts?  Yes    Do you/your family use safety helmets? Yes    Do you/your family use sunscreen? Yes    Do you/your family keep firearms in the home? No    Do you/your family have a smoke detector(s)? Yes        Do you feel safe in your home? Yes    Has anyone ever touched you in an unwanted manner? No         Jose CRYSTAL LPN 6/4/2013                             Social Determinants of Health     Financial Resource Strain:      Difficulty of Paying Living Expenses:    Food Insecurity:      Worried About Running Out of Food in the Last Year:      Ran Out of Food in the Last Year:    Transportation Needs:      Lack of Transportation (Medical):      Lack of  Transportation (Non-Medical):    Physical Activity:      Days of Exercise per Week:      Minutes of Exercise per Session:    Stress:      Feeling of Stress :    Social Connections:      Frequency of Communication with Friends and Family:      Frequency of Social Gatherings with Friends and Family:      Attends [employer] Services:      Active Member of Clubs or Organizations:      Attends Club or Organization Meetings:      Marital Status:    Intimate Partner Violence:      Fear of Current or Ex-Partner:      Emotionally Abused:      Physically Abused:      Sexually Abused:        ROS: 10-point review of systems negative unless otherwise noted in HPI    Physical Exam:   Vitals:    07/18/21 0630 07/18/21 0700 07/18/21 0715 07/18/21 0818   BP:    102/63   Pulse: 65 72 71 69   Resp:    18   Temp:       TempSrc:       SpO2: 97% 98% 98% 98%      Gen: Alert, oriented, appropriately interactive, NAD  CV: Well perfused, normal rate  Resp: Good effort without distress   Abdomen: soft, mildly tender to right abdomen, non distended, no peritoneal signs. Uterus firm below umbilicus, mildly tender.   Extr: No edema    Labs:   Results for orders placed or performed during the hospital encounter of 07/17/21 (from the past 24 hour(s))   UA with Microscopic reflex to Culture    Specimen: Urine, Clean Catch   Result Value Ref Range    Color Urine Light Yellow Colorless, Straw, Light Yellow, Yellow    Appearance Urine Clear Clear    Glucose Urine Negative Negative mg/dL    Bilirubin Urine Negative Negative    Ketones Urine Negative Negative mg/dL    Specific Gravity Urine 1.021 1.003 - 1.035    Blood Urine Large (A) Negative    pH Urine 7.0 5.0 - 7.0    Protein Albumin Urine Negative Negative mg/dL    Urobilinogen Urine Normal Normal, 2.0 mg/dL    Nitrite Urine Negative Negative    Leukocyte Esterase Urine Moderate (A) Negative    Bacteria Urine Few (A) None Seen /HPF    Mucus Urine Present (A) None Seen /LPF    RBC Urine 10 (H) <=2  /HPF    WBC Urine 13 (H) <=5 /HPF    Squamous Epithelials Urine 1 <=1 /HPF    Transitional Epithelials Urine <1 <=1 /HPF    Narrative    Urine Culture ordered based on laboratory criteria   [place] Draw *Canceled*    Narrative    The following orders were created for panel order [place] Draw.  Procedure                               Abnormality         Status                     ---------                               -----------         ------                       Please view results for these tests on the individual orders.   CBC with platelets differential    Narrative    The following orders were created for panel order CBC with platelets differential.  Procedure                               Abnormality         Status                     ---------                               -----------         ------                     CBC with platelets and d...[[id number]]  Abnormal            Final result                 Please view results for these tests on the individual orders.   Comprehensive metabolic panel   Result Value Ref Range    Sodium 135 133 - 144 mmol/L    Potassium 4.2 3.4 - 5.3 mmol/L    Chloride 104 94 - 109 mmol/L    Carbon Dioxide (CO2) 24 20 - 32 mmol/L    Anion Gap 7 3 - 14 mmol/L    Urea Nitrogen 18 7 - 30 mg/dL    Creatinine 0.60 0.52 - 1.04 mg/dL    Calcium 9.7 8.5 - 10.1 mg/dL    Glucose 84 70 - 99 mg/dL    Alkaline Phosphatase 102 40 - 150 U/L    AST 24 0 - 45 U/L    ALT 23 0 - 50 U/L    Protein Total 6.6 (L) 6.8 - 8.8 g/dL    Albumin 3.1 (L) 3.4 - 5.0 g/dL    Bilirubin Total 0.3 0.2 - 1.3 mg/dL    GFR Estimate >90 >60 mL/min/1.73m2   Lipase   Result Value Ref Range    Lipase 136 73 - 393 U/L   CBC with platelets and differential   Result Value Ref Range    WBC Count 7.3 4.0 - 11.0 10e3/uL    RBC Count 3.18 (L) 3.80 - 5.20 10e6/uL    Hemoglobin 11.2 (L) 11.7 - 15.7 g/dL    Hematocrit 33.1 (L) 35.0 - 47.0 %     (H) 78 - 100 fL    MCH 35.2 (H) 26.5 - 33.0 pg    MCHC 33.8 31.5 - 36.5 g/dL     RDW 12.0 10.0 - 15.0 %    Platelet Count 337 150 - 450 10e3/uL    % Neutrophils 60 %    % Lymphocytes 31 %    % Monocytes 6 %    % Eosinophils 2 %    % Basophils 1 %    % Immature Granulocytes 0 %    NRBCs per 100 WBC 0 <1 /100    Absolute Neutrophils 4.4 1.6 - 8.3 10e3/uL    Absolute Lymphocytes 2.2 0.8 - 5.3 10e3/uL    Absolute Monocytes 0.4 0.0 - 1.3 10e3/uL    Absolute Eosinophils 0.1 0.0 - 0.7 10e3/uL    Absolute Basophils 0.1 0.0 - 0.2 10e3/uL    Absolute Immature Granulocytes 0.0 <=0.0 10e3/uL    Absolute NRBCs 0.0 10e3/uL   HCG quantitative pregnancy (blood)   Result Value Ref Range    hCG Quantitative 280 (H) 0 - 5 IU/L   Lactic acid whole blood   Result Value Ref Range    Lactic Acid 0.5 (L) 0.7 - 2.0 mmol/L   CT Abdomen Pelvis w Contrast    Narrative    EXAM: CT ABDOMEN PELVIS W CONTRAST  LOCATION: [facility]  DATE/TIME: 2021 1:54 AM    INDICATION: Abdominal pain, acute, nonlocalized. Recent  section and dilatation/curettage.  COMPARISON: Pelvic ultrasound 2015.  TECHNIQUE: CT scan of the abdomen and pelvis was performed following injection of IV contrast. Multiplanar reformats were obtained. Dose reduction techniques were used.  CONTRAST: 75mL's iso 370    FINDINGS:   LOWER CHEST: Normal.    HEPATOBILIARY: Normal.    PANCREAS: Normal.    SPLEEN: Normal.    ADRENAL GLANDS: Normal.    KIDNEYS/BLADDER: Tiny left renal cysts. Normal right kidney and bladder.    BOWEL: Normal. No obstruction or inflammation. Normal appendix.    LYMPH NODES: Normal.    VASCULATURE: Unremarkable.    PELVIC ORGANS: Postoperative changes of  section. 5.1 x 3.6 cm region of lobulated hyperdensity within the fundal endometrial cavity isodense to intravenous contrast consistent with active hemorrhage. The hemorrhage appears to arise from a vessel   of the anterior fundus. The endometrial cavity is distended to 3.5 cm wide by additional heterogeneously slightly hyperdense blood clot. No  extrauterine hemorrhage.    MUSCULOSKELETAL: Normal.      Impression    IMPRESSION:   Active endometrial hemorrhage.    I discussed the findings with Dr. Heck at 2:40 AM on 2021.   Interventional Radiology Adult/Peds IP Consult: Patient to be seen: URGENT within 4 hours; Call back #: [phone]; active hemorrhage; Requesting provider? Attending physician    Catie Melchor MD     2021  7:48 AM  INTERVENTIONAL RADIOLOGY CONSULT NOTE    Reason for referral:   Uterine artery embolization     History:   Samantha Dudley is a 40 year old  POD#17 s/p RLTCS for   failed TOLAC, POD#4 s/p D&C for hematometra and retained POC who   presented to the ER for worsening abdominal pain and vaginal   bleeding. CT shows active extravasation within the uterus.     Plan:    Patient is on IR schedule for uterine artery emblization. Labs   WNL for procedure. Continue NPO status. Consent will be done   prior to procedure.    Labs:  Lab Results   Component Value Date    HGB 11.2 2021    HGB 10.8 2021     Lab Results   Component Value Date     2021     2021     Lab Results   Component Value Date    WBC 7.3 2021    WBC 9.3 2021       Lab Results   Component Value Date    INR 0.98 2021       Lab Results   Component Value Date    PROTTOTAL 6.6 2021      Lab Results   Component Value Date    ALBUMIN 3.1 2021     Lab Results   Component Value Date    BILITOTAL 0.3 2021     No results found for: BILICONJ   Lab Results   Component Value Date    ALKPHOS 102 2021     Lab Results   Component Value Date    AST 24 2021     Lab Results   Component Value Date    ALT 23 2021       Lab Results   Component Value Date    CR 0.60 2021    CR 0.8 2014     Lab Results   Component Value Date    BUN 18 2021    BUN 11.0 2014       Imaging:   CT demonstrates active extravasation in the uterine fundus.   Bilateral uterine  arteries look prominent with left appears   significantly more dilated and abnormal.     If procedure has been approved, IR charge RN can be contacted at   [phone] for estimated time of procedure.     Discussed with Dr. Maurice, OB and ER staffs.     Catie Ledesma MD.   Diagnostic/Interventional Radiology PGY-5  IR pass pager: [phone]   INR   Result Value Ref Range    INR 0.98 0.86 - 1.14   Asymptomatic COVID-19 Virus (Coronavirus) by PCR Nasopharyngeal    Specimen: Nasopharyngeal; Swab    Narrative    The following orders were created for panel order Asymptomatic COVID-19 Virus (Coronavirus) by PCR Nasopharyngeal.  Procedure                               Abnormality         Status                     ---------                               -----------         ------                     SARS-COV2 (COVID-19) Vir...[[id number]]  Normal              Final result                 Please view results for these tests on the individual orders.   SARS-COV2 (COVID-19) Virus RT-PCR    Specimen: Nasopharyngeal; Swab   Result Value Ref Range    SARS CoV2 PCR Negative Negative    Narrative    Testing was performed using the jacki  SARS-CoV-2 & Influenza A/B Assay on the jacki  [place]  System.  This test should be ordered for the detection of SARS-COV-2 in individuals who meet SARS-CoV-2 clinical and/or epidemiological criteria. Test performance is unknown in asymptomatic patients.  This test is for in vitro diagnostic use under the FDA EUA for laboratories certified under CLIA to perform moderate and/or high complexity testing. This test has not been FDA cleared or approved.  A negative test does not rule out the presence of PCR inhibitors in the specimen or target RNA in concentration below the limit of detection for the assay. The possibility of a false negative should be considered if the patient's recent exposure or clinical presentation suggests COVID-19.  [facility] are certified under the Clinical  Laboratory Improvement Amendments of 1988 (CLIA-88) as qualified to perform moderate and/or high complexity laboratory testing.       Imaging:   CTAP  PELVIC ORGANS: Postoperative changes of  section. 5.1 x 3.6 cm region of lobulated hyperdensity within the fundal endometrial cavity isodense to intravenous contrast consistent with active hemorrhage. The hemorrhage appears to arise from a vessel   of the anterior fundus. The endometrial cavity is distended to 3.5 cm wide by additional heterogeneously slightly hyperdense blood clot. No extrauterine hemorrhage.                                                                   IMPRESSION:   Active endometrial hemorrhage.    Op findings   Findings: EUA revealed 12 week sized anteverted mobile uterus, no adnexal masses palpated. Retained products of conception noted inside the uterus with shaggy endometrium noted throughout. Trigger point injections were performed at the right side of her previous Pfannenstiel incision with 0.25% bupivacaine and kenalog for a total of 10 mL.        A&P: Samantha Dudley is a 40 year old  POD#17 s/p RLTCS for unsuccessful TOLAC, POD#4 s/p D&C for hematometra/possible retained POC, here with abdominal pain. Imaging was consistent with reaccumulation of hematometra with fundal extravasation, suspect uterine AVM/focal accreta at fundus and ongoing delayed healing. She has no current intra-abdominal bleeding. Plan for admission for IR embolization, who will plan to proceed this morning as she is currently vitally stable, and hemoglobin is stable. Patient is understandably frustrated with prolonged postoperative course. She was reassured with current treatment plan, unusual course, and likely etiologies.     #Abdominal pain  #Active endometrial bleeding  - Possible uterine AVM vs focal accreta (anterior uterine fundus)  - NPO with IVF for IR procedure  - IR consulted, plan for embolization this morning   - Consider D&C for  removal of hematometra likely causing pain post embolization if stable  - Frequent vital signs to assess for symptomatic anemia secondary to ongoing bleeding    Discussed with Dr. Rader.     Sepideh Holt MD PGY3  Obstetrics & Gynecology  07/18/21     I examined Samantha Dudley on 7/18/2021 with Dr. Holt and agree with the presentation, exam and plan of care documented in this note with edits by me.   Rashmi Rader MD MPH

## 2021-07-18 NOTE — SEDATION DOCUMENTATION
Patient Name: Samantha Dudley  Medical Record Number: [id number]  Today's Date: 7/18/2021    Procedure: UAE  Proceduralist: Dr Maurice and Dr Ledesma    Procedure Start: 1235  Procedure end: 1710  Sedation medications administered: 9.5mg Versed and 575 mcg Fentanyl     Report given to: Manolo NUNN  : none    Other Notes: Pt arrived to IR room 1 from . Consent reviewed. Pt denies any questions or concerns regarding procedure. Pt positioned supine and monitored per protocol. Pt tolerated procedure without any noted complications. Pt transferred back to  with RN. JALYN throughout the procedure. MD spoke to patient and her  post procedure.

## 2021-07-18 NOTE — PROGRESS NOTES
Progress Note     S:  states sx she called about earlier just continued to worsen causing her to come in to be evaluated (see telephone note)        O:  /77   Pulse 68   Temp 98.7  F (37.1  C) (Oral)   Resp 16   SpO2 98%   Patient Vitals for the past 8 hrs:   BP Temp Temp src Pulse Resp SpO2   21 0245 110/77 -- -- 68 -- 98 %   21 0151 108/70 98.7  F (37.1  C) Oral 59 16 98 %     No intake/output data recorded.     Exam    Gen: NAD, occ teary given postop course    Abd: soft, ttp midline --> right, no rebound/guarding    Hemoglobin Latest Ref Range: 11.7 - 15.7 g/dL 12.2 10.8 (L) 11.4 (L) 11.2 (L)       A/P:  Samantha Dudley is a 40 year old  POD#17 s/p RLTCS for failed TOLAC, POD#4 s/p D&C for hematometra/possible retained POC.    Given reaccumulation of hematometra and CT imaging with fundal extravasation, suspect uterine AVM/focal accreta at fundus and ongoing delayed healing  Reviewed with IR fellow re: possible embolization, formal consult placed  Discussed possible need for D&C post embolization to evauation current intrauterine clot  Admit, NPO with IVF pending possible IR procedure    Rashmi Rader MD MPH

## 2021-07-18 NOTE — PRE-PROCEDURE
GENERAL PRE-PROCEDURE:   Procedure:  UAE  Date/Time:  7/18/2021 11:34 AM    Verbal consent obtained?: Yes    Written consent obtained?: Yes    Risks and benefits: Risks, benefits and alternatives were discussed    Consent given by:  Patient  Patient states understanding of procedure being performed: Yes    Patient's understanding of procedure matches consent: Yes    Procedure consent matches procedure scheduled: Yes    Expected level of sedation:  Moderate  Appropriately NPO:  Yes  ASA Class:  Class 2- mild systemic disease, no acute problems, no functional limitations  Mallampati  :  Grade 1- soft palate, uvula, tonsillar pillars, and posterior pharyngeal wall visible  Lungs:  Lungs clear with good breath sounds bilaterally  Heart:  Normal heart sounds and rate  History & Physical reviewed:  History and physical reviewed and no updates needed  Statement of review:  I have reviewed the lab findings, diagnostic data, medications, and the plan for sedation

## 2021-07-18 NOTE — ED PROVIDER NOTES
"ED Provider Note  [facility]      History     Chief Complaint   Patient presents with     Abdominal Pain     severe RLQ abd pain;  , then d&c 2 days ago. +dysuria, moderate vag bleeding present. Pain worse after eating, \"it feels like there's something kinked in there\"     The history is provided by the patient, medical records and the spouse.     Samantha Dudley is a 40 year old  female with PMH notable for bulimia who is s/p uncomplicated   and s/p D&C  for retained products of conception who presents to the ED with abdominal pain. Patient is accompanied by her . Patient reports that she has had incisional pain since her , but it began to radiate up the abdomen in the past few days. Pain significantly increased in the past 3 days and became unbearable tonight. Tonight her pain became so severe that she could not breathe. She states that the pain is an intermittent, sharp pain that is worse with movement and eating. She reports that when she eats or drinks she feels bubbles in her RLQ that increase her pain, making her feel like her intestines are \"kinked\". She feels her abdomen is distended on the right side. She still has an appetite, has been passing gas, and had her first BM since her D&C just before arrival. She has been taking Advil, Tylenol, and Gabapentin at home. This relieves her incision pain, but not the pain above the incision. She does have oxycodone at home, but has not been wanting to take it due to the side effects. She reports dysuria, but has had some waxing and waning dysuria since the . She is still having some vaginal bleeding, but much less. She denies nausea, vomiting, fever, and malodorous vaginal discharge.     Past Medical History  Past Medical History:   Diagnosis Date     Asthma      Brachial neuritis 2011    resolved with P     Cervical dysplasia 2010    treated in [place], normal f/u " "paps     Eating disorder     previously on Prozac     Generalized anxiety disorder 2013    Problem resolved at pt request  that it be removed from problem list.      Hoarseness      Menarche age 14    cycles q mo x 7d, heavy, w cramps     Nasal congestion      Neck pain 2011     Oral contraceptive use age 18    for cycle control     Sore throat      Trouble breathing     At night     Past Surgical History:   Procedure Laterality Date      SECTION N/A 2021    Procedure:  SECTION;  Surgeon: Rina Mcdonald MD;  Location: [facility] L+D     CONIZATION LEEP      \"most painful thing I've ever had\"     DILATION AND CURETTAGE, OPERATIVE HYSTEROSCOPY WITH MORCELLATOR, COMBINED N/A 2021    Procedure: HYSTEROSCOPY, WITH SUCTION DILATION AND CURETTAGE OF UTERUS USING MORCELLATOR;  Surgeon: Nini Gallagher MD;  Location: [facility] OR     perianal lesion excision  child    scar on R @ 2:00 -- benign lesion      [place] teeth extraction  18     gabapentin (NEURONTIN) 300 MG capsule  acetaminophen (TYLENOL) 325 MG tablet  albuterol (PROAIR HFA/PROVENTIL HFA/VENTOLIN HFA) 108 (90 Base) MCG/ACT inhaler  doxylamine (UNISOM) 25 MG TABS tablet  FLUoxetine (PROZAC) 10 MG capsule  hydrOXYzine (VISTARIL) 25 MG capsule  ibuprofen (ADVIL/MOTRIN) 600 MG tablet  magnesium 250 MG tablet  mometasone (ASMANEX TWISTHALER) 220 MCG/INH inhaler  multivitamin w/minerals (MULTI-VITAMIN) tablet  Omega-3 Fatty Acids (FISH OIL PO)  oxyCODONE (ROXICODONE) 5 MG tablet  polyethylene glycol (MIRALAX) 17 GM/Dose powder  Prenatal Vit-Fe Fumarate-FA (PRENATAL MULTIVITAMIN W/IRON) 27-0.8 MG tablet  senna-docusate (SENOKOT-S/PERICOLACE) 8.6-50 MG tablet  simethicone (MYLICON) 80 MG chewable tablet      Allergies   Allergen Reactions     Other [No Clinical Screening - See Comments] Anaphylaxis     Triamenic cough syrup.  Per patients father when she was 5 y.o.     Cat Hair Extract Itching     Family History  Family History "   Problem Relation Age of Onset     Lipids Father      Hypertension Father 72     Breast Cancer Maternal Aunt 50     Ovarian Cancer Mother 58        recurrance Xs 4     Depression Mother 50     Alcohol/Drug Paternal Grandfather      Depression Sister 24     Depression Brother 24     C.A.D. No family hx of      Diabetes No family hx of      Cerebrovascular Disease No family hx of      Cancer - colorectal No family hx of      Prostate Cancer No family hx of      Thyroid Disease No family hx of      Social History   Social History     Tobacco Use     Smoking status: Never Smoker     Smokeless tobacco: Never Used   Substance Use Topics     Alcohol use: Yes     Comment: 2-3 drinks 1x/wk max     Drug use: No      Past medical history, past surgical history, medications, allergies, family history, and social history were reviewed with the patient. No additional pertinent items.       Review of Systems   Constitutional: Negative for fever.   HENT: Negative for congestion.    Eyes: Negative for redness.   Respiratory: Negative for shortness of breath.    Cardiovascular: Negative for chest pain.   Gastrointestinal: Positive for abdominal pain. Negative for nausea and vomiting.   Endocrine: Negative for polyuria.   Genitourinary: Positive for dysuria. Negative for difficulty urinating and vaginal discharge.   Musculoskeletal: Negative for arthralgias and neck stiffness.   Skin: Negative for color change.   Allergic/Immunologic: Negative for immunocompromised state.   Neurological: Negative for headaches.   Hematological: Does not bruise/bleed easily.   Psychiatric/Behavioral: Negative for confusion.   All other systems reviewed and are negative.      Physical Exam   BP: 108/70  Pulse: 59  Temp: 98.7  F (37.1  C)  Resp: 16  SpO2: 98 %  Physical Exam  General: Afebrile, moderate distress secondary to pain  HEENT: Normocephalic, atraumatic, conjunctivae normal. MMM  Neck: non-tender, supple  Cardio: regular rate. regular rhythm    Resp: Normal work of breathing, no respiratory distress, lungs clear bilaterally, no wheezing, rhonchi, rales  Chest/Back: no visual signs of trauma, no CVA tenderness   Abdomen: soft, non distension, significant tenderness to palpation in the right lower abdomen with voluntary guarding, no peritoneal signs, mild tenderness to the vaginal suprapubic region and fundus,  incision healing well with no erythema, no drainage, no dehiscence  Neuro: alert and fully oriented. CN II-XII grossly intact. Grossly normal strength and sensation in all extremities.   MSK: no deformities. Normal range of motion  Integumentary/Skin: no rash visualized, normal color  Psych: normal affect, normal behavior    ED Course     ED Course as of 359   Sun 2021   [address] [place] Draw     Procedures     CRITICAL CARE: 60 minutes exclusive of procedures but including obtaining history, bedside examination, supervision of care, record review and collateral history from other parties, documentation, review/interpretation of imaging and lab results, discussion with patient, family, nursing, ancillary staff, consultants, and admitting service provider.   This patient presented with active bleeding/hemorrhage from vessel of anterior fundus requiring immediate bedside evaluation and intervention to prevent sudden, clinically significant, or life threatening deterioration in the patient's condition.            Medications   0.9% sodium chloride BOLUS (1,000 mLs Intravenous New Bag 21 0042)     Followed by   sodium chloride 0.9% infusion ( Intravenous New Bag 21 0316)   iopamidol (ISOVUE-370) solution 100 mL (75 mLs Intravenous Given 21 021)   sodium chloride 0.9 % bag 500mL for CT scan flush use (59 mLs As instructed Given 21)        Assessments & Plan (with Medical Decision Making)   Samantha Dudley is a 40 year old  female with PMH notable for bulimia who is s/p uncomplicated   and s/p  D&C  for retained products of conception who presents to the ED with abdominal pain.  Upon arrival patient is well-appearing, afebrile, moderate distress secondary to pain.  Patient here with significant right lower abdominal tenderness to palpation with voluntary guarding, tenderness over suprapubic region and fundus,  incision appears to be healing well, with no erythema, no drainage, no dehiscence.  Differential diagnosis includes but is not limited to retained products of conception versus hematoma versus postoperative pain versus adhesions versus obstruction versus appendicitis among others.  At this time plan for comprehensive labs, CT scan of the abdomen pelvis, will hydrate with 1 L normal saline IV fluid bolus, at this time patient declined any pain medications while in the emergency department.    I Reviewed comprehensive labs which remarkable for white blood cell count of 7.3, hemoglobin 11.2, no acute metabolic electro abnormality, normal lactic acid 0.5.  I reviewed CT scan abdomen pelvis and discussed results with radiology which demonstrates active bleeding/hemorrhage in endometrial cavity, the hemorrhage appears to arise from a vessel of the anterior fundus.  I discussed results with OB/GYN who would like to get IR involved.  OB/GYN at the bedside evaluating the patient.  I discussed the case with interventional radiology.  At this time plan on admission to OB/GYN, likely IR procedure in the morning.  We will keep patient n.p.o.  Patient understands and agrees with the plan.    I have reviewed the nursing notes. I have reviewed the findings, diagnosis, plan and need for follow up with the patient.    New Prescriptions    No medications on file       Final diagnoses:   Abdominal pain, right lower quadrant   Active bleeding   Abnormal uterine bleeding, postpartum   IElise, am serving as a trained medical scribe to document services personally performed by Bernarda Heck,  MD, based on the provider's statements to me.     I, Bernarda Heck MD, was physically present and have reviewed and verified the accuracy of this note documented by Elise Dubose.      --  Bernarda Heck, [facility] EMERGENCY DEPARTMENT  7/17/2021     Bernarda Heck MD  07/18/21 0402

## 2021-07-18 NOTE — PLAN OF CARE
VS:   BP 93/70 (BP Location: Right arm)   Pulse 69   Temp 98.2  F (36.8  C) (Oral)   Resp 8   SpO2 94%   RA   Output:   Rojas catheter in place postop IR procedure  LBM prior to admission 21   Activity:   On bedrest for 3 hours HOB <30 degrees R leg straight, start time 1710.   Skin: Lower abd incisions from  section open to air, IR puncture on inside of R thigh dried blood present not actively bleeding.   Pain:   Patient reported 4/10 pain in lower back PRN tylenol given spoke with OB after giving report medications updated for next shift.   Neuro/CMS:   A&Ox4, no N/T, strengths intact.   Dressing(s):   None incision on lower abdomen from  section open to air approximated, IR puncture open to air dried blood no bleeding present.   Diet:   Clear liquid diet post procedure.   LDA:   PIV L forearm infusing LR 100mL/hr.   Equipment:   IV pole, personal belongings, call light.   Plan:   Interventional radiology procedure at 1100, continue to monitor, continue plan of care.   Additional Info:   Patient went down for IR procedure at 1100  Patient went down for pelvic ultrasound at 0855  Patient returned from IR at 1800.  HOB <30 degrees bedrest R leg straight for 3 hours post IR procedure start time 1710.     Pt. admitted from ED at 0830 via transport and transferred to bed 814. Pt. arrived with personal belongings. Report was taken from RN in ED.  Pt. is A&Ox3 and VSS on RA. CMS intact. Pt. c/o 2/10 pain. Pt. denied nausea, CP, SOB, lightheadedness, or dizziness. LS clear and BS active. PIV patent and infusing NS 125mL/hr. Pt. oriented to the room and call light system.     Pt. admitted from IR at 1800 via IR team and transferred to bed 814. Pt. accompanied by family member and arrived with personal belongings. Report was taken from Gilberto in IR.  Pt. is A&Ox3 and VSS on RA. CMS intact. Pt. c/o 4/10 pain PRN tylenol given awaiting orders from OB. Pt denied nausea, CP, SOB, lightheadedness,  or dizziness. LS clear and BS active. PIV patent and infusing LR. Rojas patent and draining. Pt. oriented to the room and call light system.  Pt. educated on use and purpose of IS.

## 2021-07-19 ENCOUNTER — ANESTHESIA (OUTPATIENT)
Dept: SURGERY | Facility: CLINIC | Age: 41
DRG: 769 | End: 2021-07-19
Payer: COMMERCIAL

## 2021-07-19 ENCOUNTER — APPOINTMENT (OUTPATIENT)
Dept: GENERAL RADIOLOGY | Facility: CLINIC | Age: 41
DRG: 769 | End: 2021-07-19
Payer: COMMERCIAL

## 2021-07-19 ENCOUNTER — APPOINTMENT (OUTPATIENT)
Dept: INTERVENTIONAL RADIOLOGY/VASCULAR | Facility: CLINIC | Age: 41
DRG: 769 | End: 2021-07-19
Attending: RADIOLOGY
Payer: COMMERCIAL

## 2021-07-19 ENCOUNTER — APPOINTMENT (OUTPATIENT)
Dept: GENERAL RADIOLOGY | Facility: CLINIC | Age: 41
DRG: 769 | End: 2021-07-19
Attending: RADIOLOGY
Payer: COMMERCIAL

## 2021-07-19 LAB
ABO/RH(D): NORMAL
ANTIBODY SCREEN: NEGATIVE
BACTERIA UR CULT: NORMAL
LACTATE SERPL-SCNC: 3.4 MMOL/L (ref 0.7–2)
PATH REPORT.COMMENTS IMP SPEC: NORMAL
PATH REPORT.COMMENTS IMP SPEC: NORMAL
PATH REPORT.FINAL DX SPEC: NORMAL
PATH REPORT.GROSS SPEC: NORMAL
PATH REPORT.MICROSCOPIC SPEC OTHER STN: NORMAL
PHOTO IMAGE: NORMAL
SPECIMEN EXPIRATION DATE: NORMAL

## 2021-07-19 PROCEDURE — 255N000002 HC RX 255 OP 636: Performed by: RADIOLOGY

## 2021-07-19 PROCEDURE — 250N000013 HC RX MED GY IP 250 OP 250 PS 637: Performed by: STUDENT IN AN ORGANIZED HEALTH CARE EDUCATION/TRAINING PROGRAM

## 2021-07-19 PROCEDURE — 88305 TISSUE EXAM BY PATHOLOGIST: CPT | Mod: 26 | Performed by: PATHOLOGY

## 2021-07-19 PROCEDURE — 36415 COLL VENOUS BLD VENIPUNCTURE: CPT | Performed by: STUDENT IN AN ORGANIZED HEALTH CARE EDUCATION/TRAINING PROGRAM

## 2021-07-19 PROCEDURE — 272N000002 HC OR SUPPLY OTHER OPNP: Performed by: OBSTETRICS & GYNECOLOGY

## 2021-07-19 PROCEDURE — 999N000179 XR SURGERY CARM FLUORO LESS THAN 5 MIN W STILLS

## 2021-07-19 PROCEDURE — 71045 X-RAY EXAM CHEST 1 VIEW: CPT | Mod: 26 | Performed by: RADIOLOGY

## 2021-07-19 PROCEDURE — 250N000011 HC RX IP 250 OP 636: Performed by: OBSTETRICS & GYNECOLOGY

## 2021-07-19 PROCEDURE — 59160 D & C AFTER DELIVERY: CPT | Mod: GC | Performed by: OBSTETRICS & GYNECOLOGY

## 2021-07-19 PROCEDURE — B41B1ZZ FLUOROSCOPY OF OTHER INTRA-ABDOMINAL ARTERIES USING LOW OSMOLAR CONTRAST: ICD-10-PCS | Performed by: RADIOLOGY

## 2021-07-19 PROCEDURE — 250N000009 HC RX 250: Performed by: OBSTETRICS & GYNECOLOGY

## 2021-07-19 PROCEDURE — 258N000003 HC RX IP 258 OP 636: Performed by: NURSE ANESTHETIST, CERTIFIED REGISTERED

## 2021-07-19 PROCEDURE — 250N000025 HC SEVOFLURANE, PER MIN: Performed by: OBSTETRICS & GYNECOLOGY

## 2021-07-19 PROCEDURE — 250N000009 HC RX 250: Performed by: NURSE ANESTHETIST, CERTIFIED REGISTERED

## 2021-07-19 PROCEDURE — 120N000002 HC R&B MED SURG/OB UMMC

## 2021-07-19 PROCEDURE — 76998 US GUIDE INTRAOP: CPT | Mod: 26 | Performed by: OBSTETRICS & GYNECOLOGY

## 2021-07-19 PROCEDURE — 71045 X-RAY EXAM CHEST 1 VIEW: CPT

## 2021-07-19 PROCEDURE — 83605 ASSAY OF LACTIC ACID: CPT | Performed by: STUDENT IN AN ORGANIZED HEALTH CARE EDUCATION/TRAINING PROGRAM

## 2021-07-19 PROCEDURE — 258N000003 HC RX IP 258 OP 636: Performed by: STUDENT IN AN ORGANIZED HEALTH CARE EDUCATION/TRAINING PROGRAM

## 2021-07-19 PROCEDURE — 88305 TISSUE EXAM BY PATHOLOGIST: CPT | Mod: TC | Performed by: OBSTETRICS & GYNECOLOGY

## 2021-07-19 PROCEDURE — 360N000075 HC SURGERY LEVEL 2, PER MIN: Performed by: OBSTETRICS & GYNECOLOGY

## 2021-07-19 PROCEDURE — 250N000011 HC RX IP 250 OP 636: Performed by: NURSE ANESTHETIST, CERTIFIED REGISTERED

## 2021-07-19 PROCEDURE — 250N000011 HC RX IP 250 OP 636: Performed by: ANESTHESIOLOGY

## 2021-07-19 PROCEDURE — 85025 COMPLETE CBC W/AUTO DIFF WBC: CPT | Performed by: STUDENT IN AN ORGANIZED HEALTH CARE EDUCATION/TRAINING PROGRAM

## 2021-07-19 PROCEDURE — 10D17ZZ EXTRACTION OF PRODUCTS OF CONCEPTION, RETAINED, VIA NATURAL OR ARTIFICIAL OPENING: ICD-10-PCS | Performed by: OBSTETRICS & GYNECOLOGY

## 2021-07-19 PROCEDURE — 250N000011 HC RX IP 250 OP 636: Performed by: STUDENT IN AN ORGANIZED HEALTH CARE EDUCATION/TRAINING PROGRAM

## 2021-07-19 PROCEDURE — 272N000001 HC OR GENERAL SUPPLY STERILE: Performed by: OBSTETRICS & GYNECOLOGY

## 2021-07-19 PROCEDURE — 86900 BLOOD TYPING SEROLOGIC ABO: CPT | Performed by: OBSTETRICS & GYNECOLOGY

## 2021-07-19 PROCEDURE — 710N000009 HC RECOVERY PHASE 1, LEVEL 1, PER MIN: Performed by: OBSTETRICS & GYNECOLOGY

## 2021-07-19 PROCEDURE — 370N000017 HC ANESTHESIA TECHNICAL FEE, PER MIN: Performed by: OBSTETRICS & GYNECOLOGY

## 2021-07-19 PROCEDURE — 87040 BLOOD CULTURE FOR BACTERIA: CPT | Performed by: STUDENT IN AN ORGANIZED HEALTH CARE EDUCATION/TRAINING PROGRAM

## 2021-07-19 PROCEDURE — 36299 UNLISTED PX VASCULAR NJX: CPT | Performed by: RADIOLOGY

## 2021-07-19 PROCEDURE — 999N000141 HC STATISTIC PRE-PROCEDURE NURSING ASSESSMENT: Performed by: OBSTETRICS & GYNECOLOGY

## 2021-07-19 PROCEDURE — 999N000083 IR VISCERAL ANGIOGRAM

## 2021-07-19 RX ORDER — DIMENHYDRINATE 50 MG/ML
25 INJECTION, SOLUTION INTRAMUSCULAR; INTRAVENOUS
Status: DISCONTINUED | OUTPATIENT
Start: 2021-07-19 | End: 2021-07-19 | Stop reason: HOSPADM

## 2021-07-19 RX ORDER — ONDANSETRON 2 MG/ML
INJECTION INTRAMUSCULAR; INTRAVENOUS PRN
Status: DISCONTINUED | OUTPATIENT
Start: 2021-07-19 | End: 2021-07-19

## 2021-07-19 RX ORDER — ONDANSETRON 2 MG/ML
4 INJECTION INTRAMUSCULAR; INTRAVENOUS EVERY 30 MIN PRN
Status: DISCONTINUED | OUTPATIENT
Start: 2021-07-19 | End: 2021-07-19 | Stop reason: HOSPADM

## 2021-07-19 RX ORDER — MEPERIDINE HYDROCHLORIDE 25 MG/ML
12.5 INJECTION INTRAMUSCULAR; INTRAVENOUS; SUBCUTANEOUS
Status: DISCONTINUED | OUTPATIENT
Start: 2021-07-19 | End: 2021-07-19 | Stop reason: HOSPADM

## 2021-07-19 RX ORDER — PROPOFOL 10 MG/ML
INJECTION, EMULSION INTRAVENOUS PRN
Status: DISCONTINUED | OUTPATIENT
Start: 2021-07-19 | End: 2021-07-19

## 2021-07-19 RX ORDER — SODIUM CHLORIDE, SODIUM LACTATE, POTASSIUM CHLORIDE, CALCIUM CHLORIDE 600; 310; 30; 20 MG/100ML; MG/100ML; MG/100ML; MG/100ML
INJECTION, SOLUTION INTRAVENOUS CONTINUOUS
Status: DISCONTINUED | OUTPATIENT
Start: 2021-07-19 | End: 2021-07-19 | Stop reason: HOSPADM

## 2021-07-19 RX ORDER — KETOROLAC TROMETHAMINE 30 MG/ML
30 INJECTION, SOLUTION INTRAMUSCULAR; INTRAVENOUS ONCE
Status: COMPLETED | OUTPATIENT
Start: 2021-07-19 | End: 2021-07-19

## 2021-07-19 RX ORDER — ONDANSETRON 4 MG/1
4 TABLET, ORALLY DISINTEGRATING ORAL EVERY 30 MIN PRN
Status: DISCONTINUED | OUTPATIENT
Start: 2021-07-19 | End: 2021-07-19 | Stop reason: HOSPADM

## 2021-07-19 RX ORDER — FENTANYL CITRATE 50 UG/ML
INJECTION, SOLUTION INTRAMUSCULAR; INTRAVENOUS PRN
Status: DISCONTINUED | OUTPATIENT
Start: 2021-07-19 | End: 2021-07-19

## 2021-07-19 RX ORDER — DOXYCYCLINE 100 MG/10ML
INJECTION, POWDER, LYOPHILIZED, FOR SOLUTION INTRAVENOUS PRN
Status: DISCONTINUED | OUTPATIENT
Start: 2021-07-19 | End: 2021-07-19

## 2021-07-19 RX ORDER — LIDOCAINE HYDROCHLORIDE 10 MG/ML
INJECTION, SOLUTION INFILTRATION; PERINEURAL PRN
Status: DISCONTINUED | OUTPATIENT
Start: 2021-07-19 | End: 2021-07-19 | Stop reason: HOSPADM

## 2021-07-19 RX ORDER — IODIXANOL 320 MG/ML
INJECTION, SOLUTION INTRAVASCULAR PRN
Status: DISCONTINUED | OUTPATIENT
Start: 2021-07-19 | End: 2021-07-19 | Stop reason: HOSPADM

## 2021-07-19 RX ORDER — FENTANYL CITRATE 50 UG/ML
50 INJECTION, SOLUTION INTRAMUSCULAR; INTRAVENOUS EVERY 5 MIN PRN
Status: DISCONTINUED | OUTPATIENT
Start: 2021-07-19 | End: 2021-07-19 | Stop reason: HOSPADM

## 2021-07-19 RX ORDER — FENTANYL CITRATE 50 UG/ML
50 INJECTION, SOLUTION INTRAMUSCULAR; INTRAVENOUS ONCE
Status: DISCONTINUED | OUTPATIENT
Start: 2021-07-19 | End: 2021-07-19 | Stop reason: HOSPADM

## 2021-07-19 RX ORDER — DEXAMETHASONE SODIUM PHOSPHATE 4 MG/ML
INJECTION, SOLUTION INTRA-ARTICULAR; INTRALESIONAL; INTRAMUSCULAR; INTRAVENOUS; SOFT TISSUE PRN
Status: DISCONTINUED | OUTPATIENT
Start: 2021-07-19 | End: 2021-07-19

## 2021-07-19 RX ORDER — METRONIDAZOLE 500 MG/1
500 TABLET ORAL 2 TIMES DAILY
Status: DISCONTINUED | OUTPATIENT
Start: 2021-07-19 | End: 2021-07-20

## 2021-07-19 RX ORDER — ACETAMINOPHEN 325 MG/1
975 TABLET ORAL ONCE
Status: DISCONTINUED | OUTPATIENT
Start: 2021-07-19 | End: 2021-07-19 | Stop reason: HOSPADM

## 2021-07-19 RX ORDER — LIDOCAINE HYDROCHLORIDE 20 MG/ML
INJECTION, SOLUTION INFILTRATION; PERINEURAL PRN
Status: DISCONTINUED | OUTPATIENT
Start: 2021-07-19 | End: 2021-07-19

## 2021-07-19 RX ORDER — HYDROMORPHONE HCL IN WATER/PF 6 MG/30 ML
0.2 PATIENT CONTROLLED ANALGESIA SYRINGE INTRAVENOUS EVERY 5 MIN PRN
Status: DISCONTINUED | OUTPATIENT
Start: 2021-07-19 | End: 2021-07-19 | Stop reason: HOSPADM

## 2021-07-19 RX ORDER — FENTANYL CITRATE 50 UG/ML
25-50 INJECTION, SOLUTION INTRAMUSCULAR; INTRAVENOUS
Status: DISCONTINUED | OUTPATIENT
Start: 2021-07-19 | End: 2021-07-19 | Stop reason: HOSPADM

## 2021-07-19 RX ORDER — METOPROLOL TARTRATE 1 MG/ML
1-2 INJECTION, SOLUTION INTRAVENOUS EVERY 5 MIN PRN
Status: DISCONTINUED | OUTPATIENT
Start: 2021-07-19 | End: 2021-07-19 | Stop reason: HOSPADM

## 2021-07-19 RX ORDER — HYDRALAZINE HYDROCHLORIDE 20 MG/ML
2.5-5 INJECTION INTRAMUSCULAR; INTRAVENOUS EVERY 10 MIN PRN
Status: DISCONTINUED | OUTPATIENT
Start: 2021-07-19 | End: 2021-07-19 | Stop reason: HOSPADM

## 2021-07-19 RX ORDER — SODIUM CHLORIDE, SODIUM LACTATE, POTASSIUM CHLORIDE, CALCIUM CHLORIDE 600; 310; 30; 20 MG/100ML; MG/100ML; MG/100ML; MG/100ML
INJECTION, SOLUTION INTRAVENOUS CONTINUOUS PRN
Status: DISCONTINUED | OUTPATIENT
Start: 2021-07-19 | End: 2021-07-19

## 2021-07-19 RX ADMIN — HYDROXYZINE HYDROCHLORIDE 25 MG: 25 TABLET, FILM COATED ORAL at 00:41

## 2021-07-19 RX ADMIN — LIDOCAINE HYDROCHLORIDE 100 MG: 20 INJECTION, SOLUTION INFILTRATION; PERINEURAL at 15:21

## 2021-07-19 RX ADMIN — SODIUM CHLORIDE, POTASSIUM CHLORIDE, SODIUM LACTATE AND CALCIUM CHLORIDE: 600; 310; 30; 20 INJECTION, SOLUTION INTRAVENOUS at 15:10

## 2021-07-19 RX ADMIN — FLUOXETINE 30 MG: 20 CAPSULE ORAL at 10:41

## 2021-07-19 RX ADMIN — SODIUM CHLORIDE, POTASSIUM CHLORIDE, SODIUM LACTATE AND CALCIUM CHLORIDE: 600; 310; 30; 20 INJECTION, SOLUTION INTRAVENOUS at 15:42

## 2021-07-19 RX ADMIN — SODIUM CHLORIDE, POTASSIUM CHLORIDE, SODIUM LACTATE AND CALCIUM CHLORIDE: 600; 310; 30; 20 INJECTION, SOLUTION INTRAVENOUS at 06:46

## 2021-07-19 RX ADMIN — PROPOFOL 150 MG: 10 INJECTION, EMULSION INTRAVENOUS at 15:21

## 2021-07-19 RX ADMIN — PHENYLEPHRINE HYDROCHLORIDE 0.5 MCG/KG/MIN: 10 INJECTION INTRAVENOUS at 16:34

## 2021-07-19 RX ADMIN — ACETAMINOPHEN 650 MG: 325 TABLET, FILM COATED ORAL at 00:41

## 2021-07-19 RX ADMIN — KETOROLAC TROMETHAMINE 30 MG: 30 INJECTION, SOLUTION INTRAMUSCULAR; INTRAVENOUS at 14:47

## 2021-07-19 RX ADMIN — PHENYLEPHRINE HYDROCHLORIDE 100 MCG: 10 INJECTION INTRAVENOUS at 16:33

## 2021-07-19 RX ADMIN — DOXYCYCLINE 100 MG: 100 INJECTION, POWDER, LYOPHILIZED, FOR SOLUTION INTRAVENOUS at 17:51

## 2021-07-19 RX ADMIN — OXYCODONE HYDROCHLORIDE 10 MG: 5 TABLET ORAL at 11:59

## 2021-07-19 RX ADMIN — ONDANSETRON 4 MG: 2 INJECTION INTRAMUSCULAR; INTRAVENOUS at 15:29

## 2021-07-19 RX ADMIN — FENTANYL CITRATE 50 MCG: 50 INJECTION, SOLUTION INTRAMUSCULAR; INTRAVENOUS at 16:02

## 2021-07-19 RX ADMIN — OXYCODONE HYDROCHLORIDE 10 MG: 5 TABLET ORAL at 08:01

## 2021-07-19 RX ADMIN — SUGAMMADEX 150 MG: 100 INJECTION, SOLUTION INTRAVENOUS at 18:18

## 2021-07-19 RX ADMIN — DOCUSATE SODIUM 100 MG: 100 CAPSULE, LIQUID FILLED ORAL at 20:26

## 2021-07-19 RX ADMIN — ACETAMINOPHEN 650 MG: 325 TABLET, FILM COATED ORAL at 22:37

## 2021-07-19 RX ADMIN — HYDROMORPHONE HYDROCHLORIDE 0.2 MG: 0.2 INJECTION, SOLUTION INTRAMUSCULAR; INTRAVENOUS; SUBCUTANEOUS at 19:23

## 2021-07-19 RX ADMIN — GABAPENTIN 300 MG: 300 CAPSULE ORAL at 22:27

## 2021-07-19 RX ADMIN — OXYCODONE HYDROCHLORIDE 10 MG: 5 TABLET ORAL at 03:29

## 2021-07-19 RX ADMIN — ACETAMINOPHEN 650 MG: 325 TABLET, FILM COATED ORAL at 06:24

## 2021-07-19 RX ADMIN — DOCUSATE SODIUM 100 MG: 100 CAPSULE, LIQUID FILLED ORAL at 08:01

## 2021-07-19 RX ADMIN — KETOROLAC TROMETHAMINE 30 MG: 30 INJECTION, SOLUTION INTRAMUSCULAR; INTRAVENOUS at 08:47

## 2021-07-19 RX ADMIN — HYDROMORPHONE HYDROCHLORIDE 0.4 MG: 0.2 INJECTION, SOLUTION INTRAMUSCULAR; INTRAVENOUS; SUBCUTANEOUS at 06:26

## 2021-07-19 RX ADMIN — ACETAMINOPHEN 650 MG: 325 TABLET, FILM COATED ORAL at 11:59

## 2021-07-19 RX ADMIN — PHENYLEPHRINE HYDROCHLORIDE 100 MCG: 10 INJECTION INTRAVENOUS at 16:22

## 2021-07-19 RX ADMIN — ROCURONIUM BROMIDE 20 MG: 10 INJECTION INTRAVENOUS at 16:02

## 2021-07-19 RX ADMIN — KETOROLAC TROMETHAMINE 30 MG: 30 INJECTION, SOLUTION INTRAMUSCULAR; INTRAVENOUS at 02:47

## 2021-07-19 RX ADMIN — DEXAMETHASONE SODIUM PHOSPHATE 8 MG: 4 INJECTION, SOLUTION INTRAMUSCULAR; INTRAVENOUS at 15:29

## 2021-07-19 RX ADMIN — MIDAZOLAM 2 MG: 1 INJECTION INTRAMUSCULAR; INTRAVENOUS at 15:10

## 2021-07-19 RX ADMIN — ROCURONIUM BROMIDE 50 MG: 10 INJECTION INTRAVENOUS at 15:21

## 2021-07-19 RX ADMIN — OXYCODONE HYDROCHLORIDE 10 MG: 5 TABLET ORAL at 20:26

## 2021-07-19 RX ADMIN — PHENYLEPHRINE HYDROCHLORIDE 100 MCG: 10 INJECTION INTRAVENOUS at 15:44

## 2021-07-19 RX ADMIN — HYDROMORPHONE HYDROCHLORIDE 0.4 MG: 0.2 INJECTION, SOLUTION INTRAMUSCULAR; INTRAVENOUS; SUBCUTANEOUS at 00:57

## 2021-07-19 RX ADMIN — METRONIDAZOLE 500 MG: 500 TABLET ORAL at 21:51

## 2021-07-19 RX ADMIN — SODIUM CHLORIDE, POTASSIUM CHLORIDE, SODIUM LACTATE AND CALCIUM CHLORIDE: 600; 310; 30; 20 INJECTION, SOLUTION INTRAVENOUS at 22:28

## 2021-07-19 RX ADMIN — HYDROMORPHONE HYDROCHLORIDE 0.2 MG: 0.2 INJECTION, SOLUTION INTRAMUSCULAR; INTRAVENOUS; SUBCUTANEOUS at 18:58

## 2021-07-19 RX ADMIN — GLUCAGON HYDROCHLORIDE 1 MG: KIT at 17:14

## 2021-07-19 RX ADMIN — HYDROXYZINE HYDROCHLORIDE 25 MG: 25 TABLET, FILM COATED ORAL at 08:13

## 2021-07-19 RX ADMIN — KETOROLAC TROMETHAMINE 30 MG: 30 INJECTION, SOLUTION INTRAMUSCULAR; INTRAVENOUS at 23:59

## 2021-07-19 NOTE — ANESTHESIA CARE TRANSFER NOTE
Patient: Samantha Dudley    Procedure(s):  DILATION AND CURETTAGE, UTERUS, USING SUCTION WITH CONCURRENT FLUOROSCOPY AND UTERINE ARTERY EMBOLIZATION,LEFT  Percutaneous Access into abnormal Uterine Vessels and with angiogram      Diagnosis: Placenta accreta, antepartum [O43.219]  Diagnosis Additional Information: No value filed.    Anesthesia Type:   General     Note:    Oropharynx: oropharynx clear of all foreign objects and spontaneously breathing  Level of Consciousness: drowsy  Oxygen Supplementation: face mask  Level of Supplemental Oxygen (L/min / FiO2): 6  Independent Airway: airway patency satisfactory and stable  Dentition: dentition unchanged  Vital Signs Stable: post-procedure vital signs reviewed and stable  Report to RN Given: handoff report given  Patient transferred to: PACU    Handoff Report: Identifed the Patient, Identified the Reponsible Provider, Reviewed the pertinent medical history, Discussed the surgical course, Reviewed Intra-OP anesthesia mangement and issues during anesthesia, Set expectations for post-procedure period and Allowed opportunity for questions and acknowledgement of understanding      Vitals: (Last set prior to Anesthesia Care Transfer)  CRNA VITALS  7/19/2021 1755 - 7/19/2021 1833      7/19/2021             Pulse:  114    SpO2:  99 %    Resp Rate (observed):  (!) 1        Electronically Signed By: ANDREY Pickering CRNA  July 19, 2021  6:33 PM

## 2021-07-19 NOTE — PLAN OF CARE
VS: VSS ex temp 100 degrees F, paged OB/Gyn, follow-up temp recheck 98.7 degrees F.    O2: 96% on room air. Continuous pulse ox on. Denied SOB, denied difficulty breathing.    Output: Rojas patency maintained, clear yellow.     Last BM: LBM 2021.   Activity: Bedrest post 3 hours after IR. Pt sat up in bed, pain began to increase.    Up for meals? Per activity orders. Not OOB d/t pain.    Skin: Intact except LDAs.    Pain: Pt reported bilateral hip/abdominal constant pain radiating to bilateral legs, PRN oxycodone given and scheduled gabapentin. Pt sat up in bed and pain increased to 10, favoring R side abdominal constant pain. Paged OB/Gyn, IV dilaudid 0.2 mg ordered and given x1 with no assistance. Follow-up page to OB/GYN, increased IV dilaudid dose to 0.4 mg, given and scheduled oxycodone given for follow-up. Reported off to oncoming RN, continuing to monitor.    CMS: Intact.    Dressing: Vaginal bleeding filling approx 1 pad full, OB/Gyn aware. Puncture site CDI.    Diet: Advanced to regular, thin. Pt reported nausea post eating yogurt, applesauce, and one bite of jello, PRN zofran given to assist with nausea.    LDA: RLE puncture site,  surgical site. 2 PIVs.    Equipment: None.    Plan: Continue medical management. NPO at midnight for add-on procedure tomorrow - per OB/Gyn - see note.    Additional Info: Pt A&Ox4, using call light to express needs. LR running 125 mL/hr per orders via PIV.

## 2021-07-19 NOTE — PROGRESS NOTES
GYN PROGRESS NOTE    Date: 07/19/21  HD/POD#: HD2/POD#1 R UAE      24 hour events: Angiogram of uterine arteries with embolization of right uterine artery with IR yesterday evening. Unable to safely embolize left due to branching off of ovarian artery.    Subjective:  Patient reports she is doing better but reports maximally utilizing pain control options and only somewhat improving with Toradol, oxycodone, and dilaudid. Tolerating PO intake, denies N/V. She has concerns about being able to reach the Ob/Gyn team. She also has concerns about breast engorgement but is uneasy about pumping due to associated uterine pain during her last attempt. She is requesting a consult from lactation for her concerns. We did discuss attempting to pump for a short amount of time to release the pressure but she is not sure that she would like to do this. Denies chest pain, shortness of breath, dizziness.     Objective:   Patient Vitals for the past 24 hrs:   BP Temp Temp src Pulse Resp SpO2   07/19/21 0758 101/52 98.3  F (36.8  C) Oral 78 16 96 %   07/19/21 0332 -- 99.2  F (37.3  C) Oral -- -- --   07/19/21 0115 -- 99.4  F (37.4  C) Oral -- 16 --   07/18/21 2226 118/63 100.1  F (37.8  C) Oral 73 15 96 %   07/18/21 2220 -- 98.7  F (37.1  C) -- -- -- --   07/18/21 2138 114/74 100  F (37.8  C) Oral 86 16 96 %   07/18/21 1945 107/64 -- -- 81 16 95 %   07/18/21 1915 118/54 -- -- 90 16 97 %   07/18/21 1845 118/72 -- -- 76 16 97 %   07/18/21 1830 107/67 -- -- 72 18 99 %   07/18/21 1815 -- -- -- 67 16 100 %   07/18/21 1758 108/67 98.6  F (37  C) Oral 64 14 98 %   07/18/21 1722 93/70 -- -- -- 8 94 %   07/18/21 1710 98/78 -- -- 69 8 98 %   07/18/21 1705 103/76 -- -- 70 13 96 %   07/18/21 1700 112/74 -- -- 68 10 96 %   07/18/21 1655 96/69 -- -- 69 (!) 7 97 %   07/18/21 1650 98/73 -- -- 71 (!) 7 97 %   07/18/21 1645 100/69 -- -- 75 8 96 %   07/18/21 [facility] 103/70 -- -- 68 8 97 %   07/18/21 1635 97/77 -- -- 68 8 97  %   07/18/21 1630 104/85 -- -- 74 20 96 %   07/18/21 1625 111/74 -- -- 64 19 97 %   07/18/21 1620 106/74 -- -- 65 20 99 %   07/18/21 1615 105/76 -- -- 65 (!) 7 98 %   07/18/21 1610 119/77 -- -- 67 14 99 %   07/18/21 1605 114/74 -- -- 65 11 99 %   07/18/21 1600 118/77 -- -- 64 11 99 %   07/18/21 1555 113/78 -- -- 64 10 99 %   07/18/21 1550 110/75 -- -- 60 8 98 %   07/18/21 1545 106/76 -- -- 67 23 98 %   07/18/21 1540 105/77 -- -- 63 10 98 %   07/18/21 1535 106/70 -- -- 64 9 98 %   07/18/21 1530 112/76 -- -- 62 17 98 %   07/18/21 1525 120/73 -- -- 61 8 98 %   07/18/21 1520 114/81 -- -- 63 15 98 %   07/18/21 1515 110/72 -- -- 62 9 98 %   07/18/21 1510 106/72 -- -- 61 10 98 %   07/18/21 1505 114/72 -- -- 61 8 97 %   07/18/21 1500 114/70 -- -- 61 (!) 6 97 %   07/18/21 1455 106/74 -- -- 63 8 98 %   07/18/21 1450 97/74 -- -- 60 10 98 %   07/18/21 1445 110/78 -- -- 61 10 98 %   07/18/21 1440 107/71 -- -- 61 15 97 %   07/18/21 1435 105/74 -- -- 60 8 99 %   07/18/21 1430 102/72 -- -- 66 8 98 %   07/18/21 1425 111/76 -- -- 64 8 96 %   07/18/21 1420 108/72 -- -- 64 23 97 %   07/18/21 1415 113/75 -- -- 62 8 97 %   07/18/21 1410 107/74 -- -- 65 8 97 %   07/18/21 1405 110/74 -- -- 70 20 98 %   07/18/21 1400 114/75 -- -- 60 17 98 %   07/18/21 1355 108/71 -- -- 65 9 98 %   07/18/21 1350 112/70 -- -- 61 10 98 %   07/18/21 1345 106/72 -- -- 58 24 98 %   07/18/21 1340 105/71 -- -- 62 22 97 %   07/18/21 1335 103/68 -- -- 60 11 97 %   07/18/21 1330 110/72 -- -- 60 21 98 %   07/18/21 1325 109/72 -- -- 67 17 97 %   07/18/21 1320 105/71 -- -- 66 11 97 %   07/18/21 1318 105/71 -- -- 66 11 98 %   07/18/21 1315 107/76 -- -- 64 8 98 %   07/18/21 1310 103/80 -- -- 64 8 98 %   07/18/21 1305 118/82 -- -- 66 25 98 %   07/18/21 1300 107/75 -- -- 66 (!) 7 98 %   07/18/21 1255 115/79 -- -- 67 22 98 %   07/18/21 1250 106/76 -- -- 60 20 97 %   07/18/21 1245 100/72 -- -- 60 20 95 %   07/18/21 1240 115/71 -- -- 59 18 98 %   07/18/21 1235 112/70 -- -- 68  10 99 %   21 1230 98/72 -- -- 67 9 98 %   21 1225 107/67 -- -- 60 8 98 %   21 1220 109/71 -- -- 59 8 97 %   21 0902 (!) 88/53 98.2  F (36.8  C) Oral 60 18 98 %       Intake/Output Summary (Last 24 hours) at 2021 0823  Last data filed at 2021 0621  Gross per 24 hour   Intake 60 ml   Output 2250 ml   Net -2190 ml       Gen: alert and oriented, resting in bed  Cardio: extremities warm, well perfused  Resp: non-labored breathing on room air  Abdomen: soft, mildly tender to palpation, Pfannenstiel incision clean/dry/intact  Extremities: BLEs non-tender   Lines/drains: Rojas catheter in place      Assessment: 40 year old  POD#17 s/p RLTCS due to failed TOLAC, POD#5 s/p D&C for hematometra/possible retained POC, POD#1 s/p right UAE. She is an OR add-on case today for Dr. Maurice with IR to directly inject for embolization, possibly followed by a D&C. Vital signs stable. Vaginal bleeding appropriate.     Plan:      FEN: Continue NPO status, awaiting OR time  Pain: Abdominal and breast pain persists. Continue PRN Tylenol 650 mg q6h, Gabapentin 300 TID, Dilaudid 0.4 IV q2h, Toradol 30 mg IV q6h, oxycodone 5-10 q4h PRN. Lactation consult ordered.  Pulm: History of asthma. Mometasone inhaler nightly ordered.  GI: Antiemetics PRN ordered.  : Rojas catheter in place; removal later today after procedures  Psych/Neuro: History of anxiety on fluoxetine at home, continued inpatient. Hydroxyzine added due to poor pain control. Melatonin PRN at night for sleep.      Mauricio Fontana MD  Ob/Gyn Resident, PGY-1  21 8:37 AM

## 2021-07-19 NOTE — ANESTHESIA PREPROCEDURE EVALUATION
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      Hoarseness      Menarche age 14    cycles q mo x 7d, heavy, w cramps     Nasal congestion      Neck pain 2011     Oral contraceptive use age 18    for cycle control     Sore throat      Trouble breathing     At night      Past Surgical History:   Procedure Laterality Date      SECTION N/A 2021    Procedure:  SECTION;  Surgeon: Rina Mcdonald MD;  Location: [facility] L[facility]     CONIZATION LEE      \"most painful thing I've ever had\"     DILATION AND CURETTAGE, OPERATIVE HYSTEROSCOPY WITH MORCELLATOR, COMBINED N/A 2021    Procedure: HYSTEROSCOPY, WITH SUCTION DILATION AND CURETTAGE OF UTERUS USING MORCELLATOR;  Surgeon: Nini Gallagher MD;  Location: [facility] OR     perianal lesion excision  child    scar on R @ 2:00 -- benign lesion      [place] teeth extraction  18      Allergies   Allergen Reactions     Other [No Clinical Screening - See Comments] Anaphylaxis     Triamenic cough syrup.  Per patients father when she was 5 y.o.     Cat Hair Extract Itching      Social History     Tobacco Use     Smoking status: Never Smoker     Smokeless tobacco: Never Used   Substance Use Topics     Alcohol use: Yes     Comment: 2-3 drinks 1x/wk max      Wt Readings from Last 1 Encounters:   21 69.3 kg (152 lb 11.2 oz)        Anesthesia " Evaluation   Pt has had prior anesthetic. Type: Regional and MAC.    No history of anesthetic complications       ROS/MED HX  ENT/Pulmonary:     (+) Intermittent, asthma     Neurologic:  - neg neurologic ROS     Cardiovascular:  - neg cardiovascular ROS     METS/Exercise Tolerance: >4 METS    Hematologic:  - neg hematologic  ROS     Musculoskeletal:  - neg musculoskeletal ROS     GI/Hepatic:  - neg GI/hepatic ROS  (-) GERD   Renal/Genitourinary:  - neg Renal ROS  (-) renal disease   Endo:  - neg endo ROS     Psychiatric/Substance Use:  - neg psychiatric ROS     Infectious Disease:  - neg infectious disease ROS     Malignancy:  - neg malignancy ROS     Other:            Physical Exam    Airway        Mallampati: II   TM distance: > 3 FB   Neck ROM: full   Mouth opening: > 3 cm    Respiratory Devices and Support         Dental  no notable dental history         Cardiovascular   cardiovascular exam normal          Pulmonary   pulmonary exam normal                OUTSIDE LABS:  CBC:   Lab Results   Component Value Date    WBC 7.3 07/18/2021    WBC 6.0 07/14/2021    HGB 11.2 (L) 07/18/2021    HGB 11.4 (L) 07/14/2021    HCT 33.1 (L) 07/18/2021    HCT 34.3 (L) 07/14/2021     07/18/2021     07/14/2021     BMP:   Lab Results   Component Value Date     07/18/2021    .0 02/05/2014    POTASSIUM 4.2 07/18/2021    POTASSIUM 4.6 02/05/2014    CHLORIDE 104 07/18/2021    CHLORIDE 101.0 02/05/2014    CO2 24 07/18/2021    CO2 28.0 02/05/2014    BUN 18 07/18/2021    BUN 11.0 02/05/2014    CR 0.60 07/18/2021    CR 0.8 02/05/2014    GLC 84 07/18/2021    GLC 95.0 02/05/2014     COAGS:   Lab Results   Component Value Date    INR 0.98 07/18/2021     POC:   Lab Results   Component Value Date    HCG NEGATIVE 03/04/2015     HEPATIC:   Lab Results   Component Value Date    ALBUMIN 3.1 (L) 07/18/2021    PROTTOTAL 6.6 (L) 07/18/2021    ALT 23 07/18/2021    AST 24 07/18/2021    ALKPHOS 102 07/18/2021    BILITOTAL 0.3  07/18/2021     OTHER:   Lab Results   Component Value Date    LACT 0.5 (L) 07/18/2021    KARYN 9.7 07/18/2021    LIPASE 136 07/18/2021    TSH 1.49 03/30/2012    CRP <5.0 02/05/2014       Anesthesia Plan    ASA Status:  2   NPO Status:  NPO Appropriate    Anesthesia Type: General.     - Airway: ETT   Induction: Intravenous.   Maintenance: TIVA.        Consents    Anesthesia Plan(s) and associated risks, benefits, and realistic alternatives discussed. Questions answered and patient/representative(s) expressed understanding.     - Discussed with:  Patient      - Extended Intubation/Ventilatory Support Discussed: No.      - Patient is DNR/DNI Status: No    Use of blood products discussed: Yes.     - Discussed with: Patient.     - Consented: consented to blood products            Reason for refusal: other.     Postoperative Care       PONV prophylaxis: Ondansetron (or other 5HT-3), Background Propofol Infusion     Comments:    Possible uterine AVM vs focal accreta and anterior uterine fundus     H&P reviewed: Unable to attach H&P to encounter due to EHR limitations. H&P Update: appropriate H&P reviewed, patient examined. No interval changes since H&P (within 30 days).         Deja Keyes MD

## 2021-07-19 NOTE — ANESTHESIA PROCEDURE NOTES
Airway       Patient location during procedure: OR       Procedure Start/Stop Times: 7/19/2021 3:23 PM  Staff -        CRNA: Ceasar Taylor APRN CRNA       Performed By: CRNA  Consent for Airway        Urgency: elective  Indications and Patient Condition       Indications for airway management: lawrence-procedural       Induction type:intravenous       Mask difficulty assessment: 1 - vent by mask    Final Airway Details       Final airway type: endotracheal airway       Successful airway: ETT - single  Endotracheal Airway Details        ETT size (mm): 6.5       Cuffed: yes       Successful intubation technique: direct laryngoscopy       DL Blade Type: Gomez 2       Grade View of Cords: 1       Adjucts: stylet       Position: Right       Measured from: lips       Secured at (cm): 22    Post intubation assessment        Placement verified by: capnometry, equal breath sounds and chest rise        Number of attempts at approach: 1       Number of other approaches attempted: 0       Secured with: silk tape       Ease of procedure: easy       Dentition: Intact

## 2021-07-19 NOTE — PROGRESS NOTES
"Gynecologic Oncology Postoperative Check Note  2021    S: Patient reports she is doing poorly postoperatively. Her diffuse abdominal pain is 9/10 and constant, only somewhat improving with Toradol, oxycodone, and dilaudid. She has not ambulated, and is unable to get any rest. She states that she feels awful and she can't tolerate this pain anymore, just wants to be \"knocked out.\" She has tolerated some water, juice, and apple sauce. Had nausea previously, but none currently, and denies emesis. Denies chest pain, shortness of breath, dizziness, or other concerns at this time.    O:  Vitals:    21 1945 21 2138 21 2220 21 222   BP: 107/64 114/74  118/63   BP Location: Right arm Right arm  Left arm   Pulse: 81 86  73   Resp: 16 16  15   Temp:  100  F (37.8  C) 98.7  F (37.1  C) 100.1  F (37.8  C)   TempSrc:  Oral  Oral   SpO2: 95% 96%  96%       Gen: Appears uncomfortable, tearful  Cardio: Well-perfused  Resp: Breathing comfortably on room iar  Abdomen: soft, appropriately tender, Pfannenstiel incision clean/dry/intact    A: 40 year old  POD#17 s/p RLTCS due to failed TOLAC, POD#4 s/p D&C for hematometra/possible retained POC, POD#0 s/p uterine artery embolization. During her procedure, the right uterine artery was embolized with gelfoam but the left uterine artery could not be safely embolized. She is an OR add-on case tomorrow for Dr. Maurice with IR to directly inject for embolization, possibly followed by a D&C.    FEN: NPO at midnight for add-on procedure tomorrow  Pain: PRN Tylenol 650 mg q6h, Gabapentin 300 TID, Dilaudid 0.4 IV q2h, Toradol 30 mg IV q6h, oxycodone 5-10 q4h PRN. Pain is poorly controlled, recently doubled her dilaudid and oxycodone doses. Pt received dilaudid and oxycodone within the past hour. Will continue to monitor pain.  Anxiety: Suspect an element of anxiety to patient's pain and discomfort. She has not slept in >48 hours, and is anxious about the " procedure tomorrow. Added on hydroxyzine 25-50 mg q6h prn.  : newman catheter in place    Amadeo Ramsey, MS3    Resident/Fellow Attestation   I, Tori Ledesma, was present with the medical student who participated in the service and in the documentation of the note.  I have verified the history and personally performed the physical exam and medical decision making.  I agree with the assessment and plan of care as documented in the note.  I have reviewed the note and made changes as necessary.    Tori Ledesma MD  OB/GYN, PGY-2  07/19/2021, 12:02 AM

## 2021-07-19 NOTE — PLAN OF CARE
VS: /63 (BP Location: Left arm)   Pulse 73   Temp 99.4  F (37.4  C) (Oral)   Resp 16   SpO2 96%   Cont pulse ox   Output: Rojas: 1650 mL  LBM: 7/17/2021   Lungs: Clear bilaterally, on RA. Denies chest pain or SOB   Activity: Not OOB. Assist x1 for repositioning. Pt laying flat for comfort.   Skin: WDL ex C/S incision, IR puncture site RLE   Pain:   Bilateral hip and abd pain managed w/ PRN Oxy 5-10 mg (giving 10 mg) q4h. PRN IV Toradol q6h, PRN Tylenol q6h. IV Dilaudid 0.4 mg for breakthrough pain q2h. Atarax 25-50 mg for anxiety. Zofran for nausea. Pt reports much relief rating pain 2/10 from previous 10/10.    Neuro/CMS:   A & O x4. CMS intact. Denies N/T.   Dressing(s):   Tegaderm  on IR puncture site - dried blood/drainage  Abd binder for c/s  Gauze for vaginal bleeding   Diet:   NPO after 0300 for procedure    LDA:   R PIV - infusing LR @ 100 mL/hr  L PIV - SL  Rojas, incisions   Equipment:   IV pole, abd binder, personal belongings   Plan:   Control pain, monitor bleeding. Continue w/ POC.    Additional Info:    at bedside    Pt would like to see a lactation consultant regarding pumping.

## 2021-07-19 NOTE — PROCEDURES
Interventional Radiology Brief Post Procedure Note    Procedure: Direct transvaginal and transabdominal access and angiogram of uterine wall vasculature    Proceduralist: Faye Maurice MD    Assistant: None    Time Out: Prior to the start of the procedure and with procedural staff participation, I verbally confirmed the patient s identity using two indicators, relevant allergies, that the procedure was appropriate and matched the consent or emergent situation, and that the correct equipment/implants were available. Immediately prior to starting the procedure I conducted the Time Out with the procedural staff and re-confirmed the patient s name, procedure, and site/side. (The Joint Commission universal protocol was followed.)  Yes        Sedation: General Endotracheal Anesthesia (GET) administered and documented by Anesthesia Care Provider    Findings: Significantly reduces vascularity compared to pre-uterine artery embolization images on 7/18/2021. No embolization    Estimated Blood Loss: Minimal    Fluoroscopy Time:  minute(s)    SPECIMENS: None    Complications: 1. None     Condition: Stable    Plan:   1. Immediate hysteroscopy with OB/GYN service  2. Coverage with broad spectrum antibiotics to cover enteric organisms is recommended x 7 days.    Discussed in person with Dr. Cantrell    Comments: See dictated procedure note for full details.    Faye Maurice MD

## 2021-07-19 NOTE — PROGRESS NOTES
OBGYN Attending Progress Note    OR able to add case on for 145P. Discussed nature of D&C and risks with Samantha, including bleeding, infection, uterine perforation. It is possible, though unlikely, that catastrophic, life-threatening bleeding could occur from the AVM, and in that case, a hysterectomy would be indicated. She expressed understanding. Questions answered.    Dr. Maurice to obtain consent for her portion of the procedure.    Lynette Cantrell MD, MSCI    [facility]/OBGYN

## 2021-07-19 NOTE — PROGRESS NOTES
Discussed with patient and on speaker phone with Dr. Maurice re: potential plan for tomorrow.      Reviewed abnormal vasculature and possibility of IR directly injecting for embolization tomorrow. Possible d and c after embolization, all done in OR.      PT added on OR schedule and agrees to proceed with above. Discussed that if bleeding is or becomes too heavy to control, consider Cook catheter or newman balloon.

## 2021-07-20 ENCOUNTER — TELEPHONE (OUTPATIENT)
Dept: MATERNAL FETAL MEDICINE | Facility: CLINIC | Age: 41
End: 2021-07-20

## 2021-07-20 LAB
ALBUMIN UR-MCNC: NEGATIVE MG/DL
APPEARANCE UR: CLEAR
BASOPHILS # BLD AUTO: 0 10E3/UL (ref 0–0.2)
BASOPHILS # BLD AUTO: 0 10E3/UL (ref 0–0.2)
BASOPHILS NFR BLD AUTO: 0 %
BASOPHILS NFR BLD AUTO: 0 %
BILIRUB UR QL STRIP: NEGATIVE
COLOR UR AUTO: ABNORMAL
CREAT SERPL-MCNC: 0.64 MG/DL (ref 0.52–1.04)
EOSINOPHIL # BLD AUTO: 0 10E3/UL (ref 0–0.7)
EOSINOPHIL # BLD AUTO: 0.2 10E3/UL (ref 0–0.7)
EOSINOPHIL NFR BLD AUTO: 0 %
EOSINOPHIL NFR BLD AUTO: 2 %
ERYTHROCYTE [DISTWIDTH] IN BLOOD BY AUTOMATED COUNT: 11.8 % (ref 10–15)
ERYTHROCYTE [DISTWIDTH] IN BLOOD BY AUTOMATED COUNT: 11.9 % (ref 10–15)
GFR SERPL CREATININE-BSD FRML MDRD: >90 ML/MIN/1.73M2
GLUCOSE UR STRIP-MCNC: NEGATIVE MG/DL
HCT VFR BLD AUTO: 25.8 % (ref 35–47)
HCT VFR BLD AUTO: 30.5 % (ref 35–47)
HGB BLD-MCNC: 8.4 G/DL (ref 11.7–15.7)
HGB BLD-MCNC: 9 G/DL (ref 11.7–15.7)
HGB BLD-MCNC: 9.9 G/DL (ref 11.7–15.7)
HGB UR QL STRIP: ABNORMAL
IMM GRANULOCYTES # BLD: 0 10E3/UL
IMM GRANULOCYTES # BLD: 0 10E3/UL
IMM GRANULOCYTES NFR BLD: 0 %
IMM GRANULOCYTES NFR BLD: 0 %
KETONES UR STRIP-MCNC: NEGATIVE MG/DL
LACTATE SERPL-SCNC: 1.6 MMOL/L (ref 0.7–2)
LEUKOCYTE ESTERASE UR QL STRIP: ABNORMAL
LYMPHOCYTES # BLD AUTO: 0.4 10E3/UL (ref 0.8–5.3)
LYMPHOCYTES # BLD AUTO: 0.7 10E3/UL (ref 0.8–5.3)
LYMPHOCYTES NFR BLD AUTO: 10 %
LYMPHOCYTES NFR BLD AUTO: 5 %
MCH RBC QN AUTO: 33.9 PG (ref 26.5–33)
MCH RBC QN AUTO: 34 PG (ref 26.5–33)
MCHC RBC AUTO-ENTMCNC: 32.5 G/DL (ref 31.5–36.5)
MCHC RBC AUTO-ENTMCNC: 32.6 G/DL (ref 31.5–36.5)
MCV RBC AUTO: 105 FL (ref 78–100)
MCV RBC AUTO: 105 FL (ref 78–100)
MONOCYTES # BLD AUTO: 0.2 10E3/UL (ref 0–1.3)
MONOCYTES # BLD AUTO: 0.4 10E3/UL (ref 0–1.3)
MONOCYTES NFR BLD AUTO: 3 %
MONOCYTES NFR BLD AUTO: 5 %
NEUTROPHILS # BLD AUTO: 6.1 10E3/UL (ref 1.6–8.3)
NEUTROPHILS # BLD AUTO: 6.1 10E3/UL (ref 1.6–8.3)
NEUTROPHILS NFR BLD AUTO: 83 %
NEUTROPHILS NFR BLD AUTO: 92 %
NITRATE UR QL: NEGATIVE
NRBC # BLD AUTO: 0 10E3/UL
NRBC # BLD AUTO: 0 10E3/UL
NRBC BLD AUTO-RTO: 0 /100
NRBC BLD AUTO-RTO: 0 /100
PH UR STRIP: 6 [PH] (ref 5–7)
PLATELET # BLD AUTO: 213 10E3/UL (ref 150–450)
PLATELET # BLD AUTO: 249 10E3/UL (ref 150–450)
RBC # BLD AUTO: 2.47 10E6/UL (ref 3.8–5.2)
RBC # BLD AUTO: 2.92 10E6/UL (ref 3.8–5.2)
SP GR UR STRIP: 1 (ref 1–1.03)
UROBILINOGEN UR STRIP-MCNC: NORMAL MG/DL
WBC # BLD AUTO: 6.7 10E3/UL (ref 4–11)
WBC # BLD AUTO: 7.4 10E3/UL (ref 4–11)

## 2021-07-20 PROCEDURE — 258N000003 HC RX IP 258 OP 636

## 2021-07-20 PROCEDURE — 81003 URINALYSIS AUTO W/O SCOPE: CPT | Performed by: STUDENT IN AN ORGANIZED HEALTH CARE EDUCATION/TRAINING PROGRAM

## 2021-07-20 PROCEDURE — 250N000013 HC RX MED GY IP 250 OP 250 PS 637: Performed by: STUDENT IN AN ORGANIZED HEALTH CARE EDUCATION/TRAINING PROGRAM

## 2021-07-20 PROCEDURE — 258N000003 HC RX IP 258 OP 636: Performed by: STUDENT IN AN ORGANIZED HEALTH CARE EDUCATION/TRAINING PROGRAM

## 2021-07-20 PROCEDURE — 250N000011 HC RX IP 250 OP 636: Performed by: STUDENT IN AN ORGANIZED HEALTH CARE EDUCATION/TRAINING PROGRAM

## 2021-07-20 PROCEDURE — 87086 URINE CULTURE/COLONY COUNT: CPT | Performed by: STUDENT IN AN ORGANIZED HEALTH CARE EDUCATION/TRAINING PROGRAM

## 2021-07-20 PROCEDURE — 36415 COLL VENOUS BLD VENIPUNCTURE: CPT | Performed by: OBSTETRICS & GYNECOLOGY

## 2021-07-20 PROCEDURE — 120N000002 HC R&B MED SURG/OB UMMC

## 2021-07-20 PROCEDURE — 250N000011 HC RX IP 250 OP 636: Performed by: OBSTETRICS & GYNECOLOGY

## 2021-07-20 PROCEDURE — 85025 COMPLETE CBC W/AUTO DIFF WBC: CPT

## 2021-07-20 PROCEDURE — 250N000013 HC RX MED GY IP 250 OP 250 PS 637

## 2021-07-20 PROCEDURE — 85018 HEMOGLOBIN: CPT | Performed by: STUDENT IN AN ORGANIZED HEALTH CARE EDUCATION/TRAINING PROGRAM

## 2021-07-20 PROCEDURE — 82565 ASSAY OF CREATININE: CPT | Performed by: OBSTETRICS & GYNECOLOGY

## 2021-07-20 PROCEDURE — 83605 ASSAY OF LACTIC ACID: CPT

## 2021-07-20 RX ORDER — AMOXICILLIN 250 MG
1 CAPSULE ORAL AT BEDTIME
Status: DISCONTINUED | OUTPATIENT
Start: 2021-07-20 | End: 2021-07-21 | Stop reason: HOSPADM

## 2021-07-20 RX ORDER — ACETAMINOPHEN 325 MG/1
650 TABLET ORAL
Status: COMPLETED | OUTPATIENT
Start: 2021-07-20 | End: 2021-07-20

## 2021-07-20 RX ORDER — SODIUM CHLORIDE 9 MG/ML
INJECTION, SOLUTION INTRAVENOUS
Status: DISPENSED
Start: 2021-07-20 | End: 2021-07-21

## 2021-07-20 RX ORDER — POLYETHYLENE GLYCOL 3350 17 G/17G
17 POWDER, FOR SOLUTION ORAL 2 TIMES DAILY
Status: DISCONTINUED | OUTPATIENT
Start: 2021-07-20 | End: 2021-07-21 | Stop reason: HOSPADM

## 2021-07-20 RX ORDER — AMPICILLIN 2 G/1
2 INJECTION, POWDER, FOR SOLUTION INTRAVENOUS EVERY 6 HOURS
Status: DISCONTINUED | OUTPATIENT
Start: 2021-07-20 | End: 2021-07-21

## 2021-07-20 RX ORDER — SIMETHICONE 80 MG
80 TABLET,CHEWABLE ORAL EVERY 6 HOURS PRN
Status: DISCONTINUED | OUTPATIENT
Start: 2021-07-20 | End: 2021-07-21

## 2021-07-20 RX ORDER — ACETAMINOPHEN 650 MG/1
650 SUPPOSITORY RECTAL EVERY 6 HOURS PRN
Status: COMPLETED | OUTPATIENT
Start: 2021-07-20 | End: 2021-07-20

## 2021-07-20 RX ORDER — METRONIDAZOLE 500 MG/1
500 TABLET ORAL 2 TIMES DAILY
Status: DISCONTINUED | OUTPATIENT
Start: 2021-07-20 | End: 2021-07-21 | Stop reason: HOSPADM

## 2021-07-20 RX ORDER — CLINDAMYCIN PHOSPHATE 900 MG/50ML
900 INJECTION, SOLUTION INTRAVENOUS EVERY 8 HOURS
Status: DISCONTINUED | OUTPATIENT
Start: 2021-07-20 | End: 2021-07-20

## 2021-07-20 RX ORDER — ACETAMINOPHEN 325 MG/1
650 TABLET ORAL EVERY 4 HOURS PRN
Status: DISCONTINUED | OUTPATIENT
Start: 2021-07-20 | End: 2021-07-21 | Stop reason: HOSPADM

## 2021-07-20 RX ADMIN — METRONIDAZOLE 500 MG: 500 TABLET ORAL at 22:01

## 2021-07-20 RX ADMIN — AMPICILLIN 2 G: 2 INJECTION, POWDER, FOR SOLUTION INTRAVENOUS at 06:31

## 2021-07-20 RX ADMIN — OXYCODONE HYDROCHLORIDE 10 MG: 5 TABLET ORAL at 01:52

## 2021-07-20 RX ADMIN — HYDROXYZINE HYDROCHLORIDE 25 MG: 25 TABLET, FILM COATED ORAL at 00:22

## 2021-07-20 RX ADMIN — ACETAMINOPHEN 650 MG: 325 TABLET, FILM COATED ORAL at 19:52

## 2021-07-20 RX ADMIN — AMPICILLIN 2 G: 2 INJECTION, POWDER, FOR SOLUTION INTRAVENOUS at 18:22

## 2021-07-20 RX ADMIN — KETOROLAC TROMETHAMINE 30 MG: 30 INJECTION, SOLUTION INTRAMUSCULAR; INTRAVENOUS at 19:59

## 2021-07-20 RX ADMIN — OXYCODONE HYDROCHLORIDE 10 MG: 5 TABLET ORAL at 22:41

## 2021-07-20 RX ADMIN — GENTAMICIN SULFATE 350 MG: 40 INJECTION, SOLUTION INTRAMUSCULAR; INTRAVENOUS at 01:47

## 2021-07-20 RX ADMIN — DOCUSATE SODIUM AND SENNOSIDES 1 TABLET: 8.6; 5 TABLET ORAL at 22:01

## 2021-07-20 RX ADMIN — OXYCODONE HYDROCHLORIDE 10 MG: 5 TABLET ORAL at 13:17

## 2021-07-20 RX ADMIN — AMPICILLIN 2 G: 2 INJECTION, POWDER, FOR SOLUTION INTRAVENOUS at 00:57

## 2021-07-20 RX ADMIN — DOCUSATE SODIUM 100 MG: 100 CAPSULE, LIQUID FILLED ORAL at 19:52

## 2021-07-20 RX ADMIN — POLYETHYLENE GLYCOL 3350 17 G: 17 POWDER, FOR SOLUTION ORAL at 08:18

## 2021-07-20 RX ADMIN — AMPICILLIN 2 G: 2 INJECTION, POWDER, FOR SOLUTION INTRAVENOUS at 13:17

## 2021-07-20 RX ADMIN — HYDROXYZINE HYDROCHLORIDE 25 MG: 25 TABLET, FILM COATED ORAL at 22:04

## 2021-07-20 RX ADMIN — SODIUM CHLORIDE: 9 INJECTION, SOLUTION INTRAVENOUS at 16:49

## 2021-07-20 RX ADMIN — OXYCODONE HYDROCHLORIDE 10 MG: 5 TABLET ORAL at 08:18

## 2021-07-20 RX ADMIN — HYDROMORPHONE HYDROCHLORIDE 0.4 MG: 0.2 INJECTION, SOLUTION INTRAMUSCULAR; INTRAVENOUS; SUBCUTANEOUS at 03:32

## 2021-07-20 RX ADMIN — MOMETASONE FUROATE 2 PUFF: 220 INHALANT RESPIRATORY (INHALATION) at 20:44

## 2021-07-20 RX ADMIN — POLYETHYLENE GLYCOL 3350 17 G: 17 POWDER, FOR SOLUTION ORAL at 19:53

## 2021-07-20 RX ADMIN — SIMETHICONE 80 MG: 80 TABLET, CHEWABLE ORAL at 13:19

## 2021-07-20 RX ADMIN — DOCUSATE SODIUM 100 MG: 100 CAPSULE, LIQUID FILLED ORAL at 08:18

## 2021-07-20 RX ADMIN — ACETAMINOPHEN 650 MG: 325 TABLET, FILM COATED ORAL at 10:49

## 2021-07-20 RX ADMIN — KETOROLAC TROMETHAMINE 30 MG: 30 INJECTION, SOLUTION INTRAMUSCULAR; INTRAVENOUS at 10:49

## 2021-07-20 RX ADMIN — SODIUM CHLORIDE, POTASSIUM CHLORIDE, SODIUM LACTATE AND CALCIUM CHLORIDE 500 ML: 600; 310; 30; 20 INJECTION, SOLUTION INTRAVENOUS at 08:22

## 2021-07-20 RX ADMIN — ACETAMINOPHEN 650 MG: 325 TABLET, FILM COATED ORAL at 00:59

## 2021-07-20 RX ADMIN — OXYCODONE HYDROCHLORIDE 10 MG: 5 TABLET ORAL at 18:22

## 2021-07-20 RX ADMIN — HYDROXYZINE HYDROCHLORIDE 25 MG: 25 TABLET, FILM COATED ORAL at 06:42

## 2021-07-20 RX ADMIN — FLUOXETINE 30 MG: 20 CAPSULE ORAL at 08:18

## 2021-07-20 NOTE — PLAN OF CARE
Pt up independently in room. Voiding spontaneously. Urine sample sent to lab. Pain meds given as needed. Pt making repeated requests and particular about cares. Pt verbalized to MD that she is not getting her pain meds in time, despite pt turning this away twice when attempting to give pain meds. PIV infusing. Bolus given this morning for low BP. Hgb recheck this afternoon. Afebrile on last temp check. Pt able to make needs known.

## 2021-07-20 NOTE — DISCHARGE SUMMARY
[facility]  Gynecology Discharge Summary    Admission Date:  21  Discharge Date:  21    Admission Attending: Rashmi Rader MD  Discharge Attending: Eunice Corrales MD    Admission Diagnosis:  - Abdominal pain  - Active endometrial bleeding   - Uterine AVM vs focal placental accreta at fundus   - POD#17 s/p RLTCS for failed TOLAC  - POD#4 s/p D&C for hematometra and possible retained POC    Discharge Diagnosis:  - Abdominal pain, resolving  - Active endometrial bleeding, improved   - Suspected focal placenta accreta at fundus, s/p below stated procedures  - POD#21 s/p RLTCS for failed TOLAC  - POD#8 s/p D&C for hematometra and possible retained POC    Procedures Performed:  - Right UAE  - Direct transvaginal and transabdominal access and angiogram of uterine wall vasculature  - Suction dilation and curettage     Admission History:  Samantha Dudley is a 40 year old  s/p POD#17 s/p RLTCS for failed TOLAC, POD#4 s/p D&C for hematometra/possible retained POC with abdominal pain and active endometrial bleeding secondary to suspected uterine AVM vs focal accreta at fundus. Imaging was consistent with reaccumulation of hematometra with fundal extravasation, suspect uterine AVM/focal accreta at fundus and ongoing delayed healing. Plan for admission for IR embolization, who will plan to proceed this morning as she is currently vitally stable, and hemoglobin is stable. Patient is understandably frustrated with prolonged postoperative course. She was reassured with current treatment plan, unusual course, and possible etiologies.     Surgical management with uterine artery embolization with IR was initially recommended. On , right UAE was performed. Fluoroscopy at that time revealed possible abnormal vasculature contributing to hematometria, so plan was made to return to OR on  for direct transvaginal and transabdominal access and angiogram of uterine wall vasculature. See  procedure notes with IR for further details. With continued endometrial bleeding, suction D&C was performed. The risks, benefits, and alternatives of surgery were discussed, and she agreed to proceed.    Operative Course:  She underwent suction dilation and curettage, which was uncomplicated. EBL was 400 cc. See Op Note on 7/19/21 for further details regarding the procedure.    Operative Findings:   Normal external genitalia. Bimanual examination revealed an approximately 12 week sized, anteverted, mobile uterus. Normal adnexae.  Ultrasound guidance used as tissue did not come easily with suction cannulae or sharp curette, concerning for focal accreta. Tissue teased piecemeal with ring forceps. Ultrasound showed resolution of most but not all of retained tissue.     Hospital Course:  She was started on Flagyl for prophylaxis following the above procedures. However, her postoperative course was complicated by a febrile event on POD#0 following the procedures, and antibiotics were switched to ampicillin and gentamycin for presumed endometritis. Clindamycin was not included in the regimen per patient request due to a history of C difficile infection. Flagyl was restarted on POD#1 per IR request for possible enteric coverage. On POD#2, she was transitioned to PO pain medications and IV antibiotics were stopped. On POD#2 she was tolerating regular diet, ambulating without difficulty, voiding spontaneously, and controlling pain with PO medications, and she was deemed stable for discharge. Hemoglobin at the time of discharge was 8.4.    Discharge Plans:  - The patient was discharged to home  - PO Activity:   - No driving while taking narcotics or until you can slam on the brakes without pain  - She was instructed to call [facility] or return to ED if she has any of the following:    - Temperature greater than 100.4F   - Pain not controlled by pain medications   - Uncontrolled nausea/vomiting   - Foul-smelling vaginal  discharge   - Vaginal bleeding soaking 1 pad per hour for 2 hours in a row  - She will follow up in 2 weeks with Ob/Gyn    - Discharge medications include:  -    Samantha Dudley   Home Medication Instructions CHAPIN:[id number]    Printed on:07/22/21 1293     Medication Information                        acetaminophen (TYLENOL) 325 MG tablet  Take 3 tablets (975 mg) by mouth every 6 hours             albuterol (PROAIR HFA/PROVENTIL HFA/VENTOLIN HFA) 108 (90 Base) MCG/ACT inhaler  albuterol sulfate HFA 90 mcg/actuation aerosol inhaler             docusate sodium (COLACE) 100 MG capsule  Take 1 capsule (100 mg) by mouth 2 times daily as needed for constipation             doxylamine (UNISOM) 25 MG TABS tablet  Take 25 mg by mouth At Bedtime             FLUoxetine (PROZAC) 10 MG capsule  Take 30 mg by mouth daily.             gabapentin (NEURONTIN) 300 MG capsule  Take 1 capsule (300 mg) by mouth 3 times daily             hydrOXYzine (VISTARIL) 25 MG capsule  Take 2-4 capsules ( mg) by mouth nightly as needed (sleep)             ibuprofen (ADVIL/MOTRIN) 600 MG tablet  Take 1 tablet (600 mg) by mouth every 6 hours             magnesium 250 MG tablet  Take 1 tablet by mouth daily             metroNIDAZOLE (FLAGYL) 500 MG tablet  Take 1 tablet (500 mg) by mouth 2 times daily             mometasone (ASMANEX TWISTHALER) 220 MCG/INH inhaler  Inhale 1 puff into the lungs every evening              multivitamin w/minerals (MULTI-VITAMIN) tablet  Take 1 tablet by mouth daily             Omega-3 Fatty Acids (FISH OIL PO)  Take by mouth daily             oxyCODONE (ROXICODONE) 5 MG tablet  Take 1-2 tablets (5-10 mg) by mouth every 4 hours as needed for severe pain             polyethylene glycol (MIRALAX) 17 GM/Dose powder  Take 17 g by mouth daily             Prenatal Vit-Fe Fumarate-FA (PRENATAL MULTIVITAMIN W/IRON) 27-0.8 MG tablet  Take 1 tablet by mouth daily             senna-docusate (SENOKOT-S/PERICOLACE) 8.6-50 MG  tablet  Take 2 tablets by mouth 2 times daily             simethicone (MYLICON) 80 MG chewable tablet  Take 1 tablet (80 mg) by mouth 4 times daily as needed for other (gas)                   Keyonna Mullen MD  Ob/Gyn Resident, PGY-4  07/22/21 1:29 PM     Staff MD Note    I evaluated the patient on the day of discharge.  We reviewed discharge instructions.  Patient stable for discharge.    Eunice Corrales MD

## 2021-07-20 NOTE — PROGRESS NOTES
OBGYN Attending Progress Note     Long discussion with Samantha's  regarding procedure, outcomes, expected recovery, possible complications, and concern for focal placental accreta. Questions answered. He voiced concerns about her prolonged course and issues with communication with care team. I did my best to answer his questions as completely as possible.    Will attempt to discuss these issues with Samantha this evening once she is awoken from anesthesia.     Lynette Cantrell MD, MSCI    [facility]/OBGYN

## 2021-07-20 NOTE — OR NURSING
"Report from OR staff was that there was no restrictions for pt and that she could sit up if wanted. Pt said, \"but yesterday I had to lay flat\". We called Dr. Maurice and she stated\" they did not go through an artery, so it is true she does not have any restrictions and may sit up if she likes.\"   "

## 2021-07-20 NOTE — OR NURSING
PACU to Inpatient Nursing Handoff    Patient Samantha Dudley is a 40 year old female who speaks English.   Procedure Procedure(s):  DILATION AND CURETTAGE, UTERUS, USING SUCTION WITH CONCURRENT FLUOROSCOPY AND UTERINE ARTERY EMBOLIZATION,LEFT  Percutaneous Access into abnormal Uterine Vessels and with angiogram     Surgeon(s) Primary: Lynette Cantrell MD     Allergies   Allergen Reactions     Other [No Clinical Screening - See Comments] Anaphylaxis     Triamenic cough syrup.  Per patients father when she was 5 y.o.     Cat Hair Extract Itching       Isolation  [unfilled]     Past Medical History   has a past medical history of Asthma, Brachial neuritis (01/01/2011), Cervical dysplasia (01/01/2010), Eating disorder, Generalized anxiety disorder (06/04/2013), Hoarseness, Menarche (age 14), Nasal congestion, Neck pain (04/01/2011), Oral contraceptive use (age 18), Sore throat, and Trouble breathing.    Anesthesia General   Dermatome Level     Preop Meds Not applicable   Nerve block Not applicable   Intraop Meds dexamethasone (Decadron)  fentanyl (Sublimaze): 50 mcg total  ketorolac (Toradol): last given at 1447  ondansetron (Zofran): last given at 1529   Local Meds Yes   Antibiotics doxycycline (Vibramycin) - last given at 1751     Pain Patient Currently in Pain: yes   PACU meds  hydromorphone (Dilaudid): 0.2 mg (total dose) last given at 1858    PCA / epidural No   Capnography     Telemetry ECG Rhythm: Normal sinus rhythm   Inpatient Telemetry Monitor Ordered? No        Labs Glucose Lab Results   Component Value Date    GLC 84 07/18/2021    GLC 95.0 02/05/2014       Hgb Lab Results   Component Value Date    HGB 11.2 07/18/2021    HGB 10.8 07/02/2021       INR Lab Results   Component Value Date    INR 0.98 07/18/2021      PACU Imaging Not applicable     Wound/Incision Incision/Surgical Site 07/01/21 Abdomen (Active)   Incision Assessment WDL 07/19/21 1900   Judy-Incision Assessment Warm 07/14/21 3185   Closure  Adhesive strip(s) 07/14/21 1555   Incision Drainage Amount None 07/14/21 1555   Drainage Description Other (Comment) 07/03/21 2307   Dressing Intervention Open to air / No Dressing 07/19/21 1900   Number of days: 18       Incision/Surgical Site 07/19/21 Anterior Groin (Active)   Incision Assessment [facility] 07/19/21 1900   Number of days: 0      CMS        Equipment Not applicable   Other LDA       IV Access Peripheral IV 07/19/21 Left Lower forearm (Active)   Site Assessment [facility] 07/19/21 1900   Line Status Saline locked 07/19/21 1900   Number of days: 0       Peripheral IV 07/19/21 Anterior;Left Wrist (Active)   Site Assessment [facility] 07/19/21 1900   Line Status Infusing 07/19/21 1900   Number of days: 0      Blood Products Not applicable EBL minimal mL   Intake/Output Date 07/19/21 0700 - 07/20/21 0659   Shift 5202-0683 4874-8147 0883-2774 24 Hour Total   INTAKE   P.O.  180  180   I.V.  3818.33  3818.33   IV Piggyback  10  10   Shift Total  4008.33  4008.33   OUTPUT   Urine 650 400  1050   Shift Total 650 400  1050   Weight (kg)          Drains / Newman     Time of void PreOp Void Prior to Procedure: 1300 (newman in place) (07/19/21 1239)    PostOp Voided (mL): 400 mL (07/19/21 1800)    Diapered? No   Bladder Scan      mL (07/19/21 1900)  tolerating sips     Vitals    B/P: (!) 101/94  T: 98.2  F (36.8  C)    Temp src: Oral  P:  Pulse: 84 (07/19/21 1900)          R: 12  O2:  SpO2: 97 %    O2 Device: None (Room air) (07/19/21 1900)    Oxygen Delivery: 2 LPM (07/19/21 1828)         Family/support present significant other   Patient belongings     Patient transported on bed   DC meds/scripts (obs/outpt) Not applicable   Inpatient Pain Meds Released? No- cant because they are on hold       Special needs/considerations None   Tasks needing completion None       Monica Hooks RN  ASCOM [id number]

## 2021-07-20 NOTE — OP NOTE
PROCEDURE NOTE     Patient Name: Samantha Dudley  Procedure date: 21  Pre-procedure diagnosis: 1. hematometra   Post-procedure diagnosis: 1. retained products of conception  Procedure: Suction dilation and curettage  Surgeon: Dr. Lynette Cantrell  Assistant: Dr. Oneal Licea  Anesthesia: General   EBL: 400mL  IV Fluids: see anesthesia record  UOP: 400mL      INDICATIONS:   Samantha Dudley is a 40 year old  admitted with hematometra after a  delivery 17 days prior. 1 day prior, Samantha underwent uterine artery embolization, and fluoroscopy at that time revealed possible abnormal vasculature contributing to hematometra, so plan was made to return to OR today for direct vessel embolization followed by suction dilation and curettage.    Please see Dr. Maurice's procedural report for the description of her procedure. Ultimately, no vessels were identified as candidates for treatment today.     She and I discussed the risks of suction dilation and curettage, including infection, hemorrhage, uterine perforation, DVT/PE, hysterectomy. Questions answered, consent signed.      FINDINGS:   Normal external genitalia.   Bimanual examination revealed an approximately 12 week sized, anteverted, mobile uterus. Normal adnexae.  Ultrasound guidance used as tissue did not come easily with suction cannulae or sharp curette, concerning for focal accreta. Tissue teased piecemeal with ring forceps. Ultrasound showed resolution of most but not all of retained tissue.     DESCRIPTION OF PROCEDURE:  The patient was in dorsal lithotomy position already, prepped and draped for Dr. Maurice's procedure. Bimanual examination revealed the above findings. A speculum was placed into the vagina and a tenaculum applied to the anterior lip of the cervix. A paracervical block was performed with 20cc of 1% lidocaine. The cervix was dilated to 31F. A 10 curved suction cannula was passed into the uterus. Tissue was felt with the cannulae  which was not passing into the curette, so sharp curettage was attempted under ultrasound guidance. Gentle sharp curettage also did not produce significant tissue. A ring forcep was then used to tease away placental tissue from the the fundal anterior portion of the endometrial cavity. When no more tissue could be obtained, a final pass with with the 10 curette demonstrated gritty texture throughout most of the cavity, except a small portion of the anterior fundal endometrium which continued to feel smooth. However, given the high suspicion at this time for focal placenta accreta, significant improvement in the amount of retained tissue, and her scant uterine bleeding, the procedure was stopped. The tenaculum was removed. The cervix was hemostatic. The speculum was removed.    All counts were reported as being correct. Anesthesia was reversed and Samantha Dudley was taken to the recovery room in good condition.    Rh status: POS; she did not require Rhogam administration  Complications: None  Specimen: Products of conception    Lynette Cantrell MD, MSCI    [facility]/OBGYN  7/19/2021 7:07 PM

## 2021-07-20 NOTE — TELEPHONE ENCOUNTER
"Pt calling [facility] looking for Dr. Jack for \"second opinion\" on complications s/p c/s on 7/1. Pt states she has received D&C x2 and UAE x2, developed fever overnight, hysterectomy for possible accreta has been discussed. Pt feels this is drastic and wants Dr. Jack's input. Case reviewed with Dr. Jack, who states pt is receiving appropriate care per chart review and would defer to Gyn team's recommendations. Pt called by this writer to relay Dr. Jack's input, pt wonders whether Dr. Jack can recommend another care team. Writer inquired about pts concerns, pt feeling she and her bedside nurse were not treated as a \"priority\" overnight. Was given Dr. Miller direct number for concerns, felt she was \"yelled at\" and the provider \"didn't care\" when nurse called to report fever. Per notes, fever was addressed at the bedside by Dr. Cantrell overnight. Pt has been rounded on by resident at approximately 6 this morning but hasnt \"seen anyone since.\" Writer offered therapeutic listening, discussed rounding schedule with Evelia Solis, and Dr. Esposito (S OB today). Dr. Esposito to round on pt shortly. Pt called, encouraged to discuss POC with Dr. Esposito.   "

## 2021-07-20 NOTE — PROVIDER NOTIFICATION
"On call resident OBGYN paged for patient increased anxiety and wanting to speak to \"charge nurse\". Would be best if pt spoke with whole team.  "

## 2021-07-20 NOTE — PROGRESS NOTES
"  VS: VSS BP 92/52 (BP Location: Right arm)   Pulse 85   Temp 99.9  F (37.7  C) (Oral)   Resp 14   SpO2 98%   Temp trending downward, MD adjusted tylenol PRN to bring down temp   O2: >95% on RA, denies SOB, pt on continuous pulse ox   Output: Voids spontaneously, SABRINA color d/t vaginal blood contaminating urine.   Last BM: 7/17 per pt report \"3 days ago\", bowel sounds hypoactive, audible.   Activity: Independent in room, walking too and from bathroom, gait steady throughout shift   Skin: CDI   Pain: Controlled with tylenol, toradol, oxy, atarax per pt request   CMS: pulses intact, denies N/T, AOx4   Dressing: none   Diet: Reg, thin liq, takes pills whole, denies any nausea or vomiting or dizziness    LDA: 2 L PIV, AC running LR at 100 ml/ hour, no signs of infiltration, bottom PIV sluggish   Equipment: IV pole, pt personal breast pump,    Plan: Pain control, monitor s/s of infection, monitor vitals   Additional Info: OBGYN team paged several times, pt started off with high fever of 102.2 that peaked to 103.2 after tylenol was given. Pt had visual chills, fever broke around 0030 and has been trending down. Pts antibiotics per POC, see OBGYN note. Pt has moderate saturation of peripads with some clots but has not saturated pads in many hours. R groin site is CDI with no bleeding.Pt having a lot of anxiety overnight but has since subsided since speaking with the OBGYN team about her fever and antibiotics. Pt was denies a visitor via ANS for an after hour visit to try and decrease anxiety. Pt requested atarax and this seemed to help with some of the pts anxiety. Pt denies CP, SOB, N/T, N/V/D. Pt        "

## 2021-07-20 NOTE — PROGRESS NOTES
Brief Progress Note:    Paged by RN regarding patient having a fever of 102.2. Vital signs otherwise within normal limits. Patient says she feels fine overall but has some chills. Denies current headache, chest pain, SOB, nausea/vomiting, trouble with urination. She has ambulated and voided without difficulty. Bleeding within normal limits. On physical exam, RRR, CTAB, abdomen non-tender, non-distended, lower extremity bilaterally non-tender/no edema. Plan to repeat temperature in 30 mins. She will also receive tylenol at this time. S/p prophylactic flagyl 500mg about 45 mins ago (IR recommended 1 week course of flagyl 500mg BID). Will draw labs if repeat temperature is elevated. Suspect that the elevated temperature is from instrumentation of the uterus this evening. No other clear etiology.     Vitals:    07/19/21 2000 07/19/21 2033 07/19/21 2212 07/19/21 2237   BP: 117/75 104/66 106/71 117/77   BP Location:  Right arm Right arm Right arm   Pulse: 77 78 92 113   Resp: 14 14 19   Temp:   (!) 102.2  F (39  C)    TempSrc:   Oral    SpO2: 98%  100%      Staffed with Dr. Óscar Licea MD  OB/GYN PGY-3  07/19/21 10:46 PM

## 2021-07-20 NOTE — PROGRESS NOTES
"OBGYN Attending Progress Note    Called at approximately 1030PM for development of new fever. I was unable to assess Samantha myself due to another patient birth, so I asked the appropriate resident to assess and report to me. I planned to assess Samantha myself when I was available.    Upon my arrival around 1145PM, Samantha was upset, expressing that she felt I had asked for any updates as to her status to my personal emergency phone line. I expressed I thought that I had given my contact info in the event that a resident could not first be reached, and apologized, and did express that I would have come right away but was with a patient when she called and so felt the next best assessment was by Dr. Licea who was immediately available. At this time, she stated that she feels she is \"a burden\" to this care team. Such a burdensome emotion was not my intent and for that I am sorry.     Discussed care plan with her of chest xray, UA/urine culture, CBC, blood cultures, and treatment for presumed endometritis with amp/gent/clinda. Samantha expressed hesitation at addition of clindamycin 2/2 h/o c diff; I stated we may defer that antibiotic for now and add if she does not defervesce with amp/gent alone. She did receive doxycycline intraoperatively for surgical prophylaxis and was started on flagyl post-operatively as prophylaxis for the fluoroscopic procedure.     Discussed fever treatment. She received tylenol 1g 1 hour ago. She requested more now. I stated the dosing interval is typically no sooner than 4 hours in order to avoid a max dose of 4g in 24 hours (liver toxicity). We may give another dose sooner as long as we don't exceed the max dose. I did offer a dose of toradol first, as NSAIDs also have an anti-pyretic effect (though weaker than tylenol). She was due for this and willing to try. She will request tylenol again in an hour if her fever is not improved.    Samantha was up to void when I arrived. Vaginal bleeding appears to " be at least 50cc between pad (unsure when last changed), hat in toilet, and toilet paper. Will continue to monitor closely. I would expect some increase in vaginal bleeding with fever, but as Samantha and I previously discussed, would be concerned with ongoing bleeding given the evidence for focal accreta.    Will continue to monitor closely.    Lynette Cantrell MD, MSCI    [facility]/OB[facility]N

## 2021-07-20 NOTE — ANESTHESIA POSTPROCEDURE EVALUATION
Patient: Samantha Dudely    Procedure(s):  DILATION AND CURETTAGE, UTERUS, USING SUCTION WITH CONCURRENT FLUOROSCOPY AND UTERINE ARTERY EMBOLIZATION,LEFT  Percutaneous Access into abnormal Uterine Vessels and with angiogram      Diagnosis:Placenta accreta, antepartum [O43.219]  Diagnosis Additional Information: No value filed.    Anesthesia Type:  General    Note:  Disposition: Outpatient   Postop Pain Control: Uneventful            Sign Out: Well controlled pain   PONV: No   Neuro/Psych: Uneventful            Sign Out: Acceptable/Baseline neuro status   Airway/Respiratory: Uneventful            Sign Out: Acceptable/Baseline resp. status   CV/Hemodynamics: Uneventful            Sign Out: Acceptable CV status; No obvious hypovolemia; No obvious fluid overload   Other NRE: NONE   DID A NON-ROUTINE EVENT OCCUR? No           Last vitals:  Vitals:    07/19/21 0758 07/19/21 1828 07/19/21 1900   BP: 101/52 (!) 101/94    Pulse: 78 94 84   Resp: 16 16 12   Temp: 36.8  C (98.3  F) 36.8  C (98.2  F) 36.8  C (98.2  F)   SpO2: 96% 97% 97%       Last vitals prior to Anesthesia Care Transfer:  CRNA VITALS  7/19/2021 1755 - 7/19/2021 1855      7/19/2021             Pulse:  114    SpO2:  99 %    Resp Rate (observed):  (!) 1          Electronically Signed By: Leticia Lemus MD  July 19, 2021  7:14 PM

## 2021-07-20 NOTE — PROGRESS NOTES
OBGYN Attending Progress Note    Long discussion with Samantha about procedure, concern for focal accreta, plan of care. My recommendations are monitoring closely overnight for more pain/bleeding. If stable, discharge and have followup w/ US in 1 week in [facility] clinic. Discussed possibility of interval hysteroscopy given my concern for a small amount of ongoing retained products of conception because of the focal accreta. If heavy bleeding/worsening pain while admitted or at any time during recovery, recommend urgent hysterectomy.     Questions answered.    Lynette Cantrell MD, MSCI    [facility]

## 2021-07-20 NOTE — PROGRESS NOTES
GYN PROGRESS NOTE    Date: 07/20/21  HD/POD#: HD3/POD#2 R UAE, POD#1 IR angiogram of uterine vasculature and D&C    24 hour events: Direct angiogram of uterine arteries without embolization of left uterine artery with IR yesterday and D&C yesterday revealing likely focal accreta. Started prophylactic Flagyl after procedures yesterday. Switched to amp/gent for presumed endometritis due to febrile event overnight. Patient declined clindamycin due to history of C diff. Lowest BP at 87/44 this morning, repeat the same.     Subjective:  Patient reports some pain along the sides of abdomen. Passing small clots. No fevers/chills this morning. Tolerating PO intake, denies N/V. She has some lightheadedness and dizziness. Denies chest pain, shortness of breath. Discussed procedure and findings yesterday, answered all questions.     Objective:   Patient Vitals for the past 24 hrs:   BP Temp Temp src Pulse Resp SpO2   07/20/21 0121 92/52 -- -- 85 14 98 %   07/20/21 0057 -- 99.9  F (37.7  C) Oral -- -- --   07/19/21 2323 -- (!) 103.2  F (39.6  C) Oral -- -- --   07/19/21 2305 115/73 (!) 103.1  F (39.5  C) Oral 104 -- --   07/19/21 2237 117/77 -- -- 113 19 --   07/19/21 2212 106/71 (!) 102.2  F (39  C) Oral 92 14 100 %   07/19/21 2033 104/66 -- -- 78 -- --   07/19/21 2000 117/75 -- -- 77 14 98 %   07/19/21 1945 110/75 -- -- 79 14 97 %   07/19/21 1930 108/64 -- -- 76 12 96 %   07/19/21 1915 108/44 -- -- 79 10 96 %   07/19/21 1900 105/57 98.2  F (36.8  C) Oral 84 12 97 %   07/19/21 1828 (!) 101/94 98.2  F (36.8  C) Oral 94 16 97 %   07/19/21 0758 101/52 98.3  F (36.8  C) Oral 78 16 96 %       Intake/Output Summary (Last 24 hours) at 7/19/2021 0823  Last data filed at 7/19/2021 0621  Gross per 24 hour   Intake 60 ml   Output 2250 ml   Net -2190 ml       Gen: alert and oriented, resting in bed  Cardio: extremities warm, well perfused  Resp: non-labored breathing on room air  Abdomen: soft, mildly  tender to palpation, Pfannenstiel incision clean/dry/intact  Extremities: BLEs non-tender       Assessment: 40 year old  POD#17 s/p RLTCS due to failed TOLAC, POD#5 s/p D&C for hematometra/possible retained POC, POD#2 s/p right UAE, POD#1 IR direct angiogram of uterine vasculature and D&C. Treating for presumed endometritis, D#2 amp/gent with abdominal pain and febrile event yesterday. Low blood pressure, afebrile since 0100.    Plan:      FEN: General diet; LR bolus for low BP. Do not suspect hypotension due to blood loss given qualitative normal bleeding, normal HR.  Pain: Continue PRN Tylenol 650 mg q6h, Gabapentin 300 TID, Dilaudid 0.4 IV q2h, Toradol 30 mg IV q6h, oxycodone 5-10 q4h PRN.   Pulm: History of asthma. Mometasone inhaler nightly ordered.  GI: Antiemetics PRN ordered. Simethicone, mirilax and senna per patient request due to reported constipation.  ID: Afebrile since about 0100. LA 3.4 and WBC normal yesterday, repeat CBC with diff and LA ordered this AM. Urine and blood cultures pending. D#2 amp/gent for presumed endometritis. Continue to monitor vitals.   Psych/Neuro: History of anxiety on fluoxetine at home, continued inpatient. Hydroxyzine added due to poor pain control. Melatonin PRN at night for sleep.      Mauricio Fontana MD  Ob/Gyn Resident, PGY-1   21 7:52 AM    /56 (BP Location: Right arm)   Pulse 85   Temp 98.8  F (37.1  C) (Oral)   Resp 15   SpO2 97%   Hemoglobin   Date Value Ref Range Status   2021 8.4 (L) 11.7 - 15.7 g/dL Final   2021 10.8 (L) 11.7 - 15.7 g/dL Final     The patient was seen and evaluated by me separately from the team.  I have reviewed and agree with the above note.  I spent over 30 minutes with the patient going over her post delivery course to date and concerns she has had during her hospitalization.  Reviewed that she has a focal placenta accreta in the setting of an IVF pregnancy-a known risk for at least focal placenta accreta.  Her  care to date has been optimal given that she desires to preserve her uterus if at all possible.  Suspect that her fever last night was 2/2 UAE but that covering with abx for possible endometritis is appropriate and indicated.  Reviewed that her pain is also largely 2/2 to UAE and to some extent the D&C yesterday and that she will likely need narcotic pain medication-which she has been taking-to control.  Discussed breastfeeding-she can pump until her breasts are empty and save the milk if she would like to continue breastfeeding (has only been pumping to relieve engorgement).  We talked about having her see lactation today-decided that maybe tomorrow would be better so she can get some rest today.  As far as post op follow up, I would consider an ultrasound in 1-2 weeks and again around 6 weeks postpartum.  There will likely be some residual POC at that time and hysteroscopic resection will probably be necessary.  Plan to keep her abx as ordered until 24 hours afebrile vs tomorrow morning.  Discussed discharge tomorrow if she remains afebrile overnight.  I am happy to see her in clinic for follow up (vs Dr. Corrales who she saw prior to delivery).  She has the number for patient relations if she wishes to submit her concerns to them.  I reviewed the plan of care for today/toinght with her nurse as well.  Her nurse has no concerns about inability to reach me or a resident from the gyn team if a need should arise.     Shagufta Esposito MD, FACOG

## 2021-07-21 VITALS
SYSTOLIC BLOOD PRESSURE: 105 MMHG | DIASTOLIC BLOOD PRESSURE: 79 MMHG | HEART RATE: 74 BPM | TEMPERATURE: 98.9 F | RESPIRATION RATE: 16 BRPM | OXYGEN SATURATION: 98 %

## 2021-07-21 LAB
BACTERIA UR CULT: NORMAL
HGB BLD-MCNC: 8.4 G/DL (ref 11.7–15.7)
HOLD SPECIMEN: NORMAL
PATH REPORT.COMMENTS IMP SPEC: NORMAL
PATH REPORT.COMMENTS IMP SPEC: NORMAL
PATH REPORT.FINAL DX SPEC: NORMAL
PATH REPORT.GROSS SPEC: NORMAL
PATH REPORT.MICROSCOPIC SPEC OTHER STN: NORMAL
PATH REPORT.RELEVANT HX SPEC: NORMAL
PHOTO IMAGE: NORMAL

## 2021-07-21 PROCEDURE — 250N000013 HC RX MED GY IP 250 OP 250 PS 637: Performed by: STUDENT IN AN ORGANIZED HEALTH CARE EDUCATION/TRAINING PROGRAM

## 2021-07-21 PROCEDURE — 250N000011 HC RX IP 250 OP 636: Performed by: STUDENT IN AN ORGANIZED HEALTH CARE EDUCATION/TRAINING PROGRAM

## 2021-07-21 PROCEDURE — 36415 COLL VENOUS BLD VENIPUNCTURE: CPT | Performed by: OBSTETRICS & GYNECOLOGY

## 2021-07-21 PROCEDURE — 85018 HEMOGLOBIN: CPT | Performed by: OBSTETRICS & GYNECOLOGY

## 2021-07-21 PROCEDURE — 258N000003 HC RX IP 258 OP 636: Performed by: STUDENT IN AN ORGANIZED HEALTH CARE EDUCATION/TRAINING PROGRAM

## 2021-07-21 PROCEDURE — 250N000013 HC RX MED GY IP 250 OP 250 PS 637

## 2021-07-21 RX ORDER — GABAPENTIN 300 MG/1
300 CAPSULE ORAL 3 TIMES DAILY
Qty: 21 CAPSULE | Refills: 0 | Status: SHIPPED | OUTPATIENT
Start: 2021-07-21

## 2021-07-21 RX ORDER — IBUPROFEN 600 MG/1
600 TABLET, FILM COATED ORAL EVERY 6 HOURS
Qty: 60 TABLET | Refills: 0 | Status: SHIPPED | OUTPATIENT
Start: 2021-07-21

## 2021-07-21 RX ORDER — DOCUSATE SODIUM 100 MG/1
100 CAPSULE, LIQUID FILLED ORAL 2 TIMES DAILY PRN
Qty: 100 CAPSULE | Refills: 0 | Status: SHIPPED | OUTPATIENT
Start: 2021-07-21

## 2021-07-21 RX ORDER — SIMETHICONE 80 MG
80 TABLET,CHEWABLE ORAL EVERY 6 HOURS
Status: DISCONTINUED | OUTPATIENT
Start: 2021-07-21 | End: 2021-07-21 | Stop reason: HOSPADM

## 2021-07-21 RX ORDER — HYDROXYZINE PAMOATE 25 MG/1
50-100 CAPSULE ORAL
Qty: 30 CAPSULE | Refills: 0 | Status: SHIPPED | OUTPATIENT
Start: 2021-07-21

## 2021-07-21 RX ORDER — METRONIDAZOLE 500 MG/1
500 TABLET ORAL 2 TIMES DAILY
Qty: 12 TABLET | Refills: 0 | Status: SHIPPED | OUTPATIENT
Start: 2021-07-21 | End: 2021-07-28

## 2021-07-21 RX ORDER — SODIUM CHLORIDE 9 MG/ML
INJECTION, SOLUTION INTRAVENOUS
Status: DISPENSED
Start: 2021-07-21 | End: 2021-07-21

## 2021-07-21 RX ORDER — ACETAMINOPHEN 325 MG/1
975 TABLET ORAL EVERY 6 HOURS
Qty: 100 TABLET | Refills: 0 | Status: SHIPPED | OUTPATIENT
Start: 2021-07-21

## 2021-07-21 RX ORDER — ZOLPIDEM TARTRATE 5 MG/1
5 TABLET ORAL
Status: DISCONTINUED | OUTPATIENT
Start: 2021-07-21 | End: 2021-07-21 | Stop reason: HOSPADM

## 2021-07-21 RX ORDER — OXYCODONE HYDROCHLORIDE 5 MG/1
5-10 TABLET ORAL EVERY 4 HOURS PRN
Qty: 30 TABLET | Refills: 0 | Status: SHIPPED | OUTPATIENT
Start: 2021-07-21

## 2021-07-21 RX ADMIN — FLUOXETINE 30 MG: 20 CAPSULE ORAL at 07:44

## 2021-07-21 RX ADMIN — AMPICILLIN 2 G: 2 INJECTION, POWDER, FOR SOLUTION INTRAVENOUS at 00:38

## 2021-07-21 RX ADMIN — METRONIDAZOLE 500 MG: 500 TABLET ORAL at 07:44

## 2021-07-21 RX ADMIN — GENTAMICIN SULFATE 350 MG: 40 INJECTION, SOLUTION INTRAMUSCULAR; INTRAVENOUS at 01:03

## 2021-07-21 RX ADMIN — DOCUSATE SODIUM 100 MG: 100 CAPSULE, LIQUID FILLED ORAL at 07:44

## 2021-07-21 RX ADMIN — HYDROXYZINE HYDROCHLORIDE 25 MG: 25 TABLET, FILM COATED ORAL at 03:58

## 2021-07-21 RX ADMIN — ACETAMINOPHEN 650 MG: 325 TABLET, FILM COATED ORAL at 08:45

## 2021-07-21 RX ADMIN — OXYCODONE HYDROCHLORIDE 10 MG: 5 TABLET ORAL at 12:55

## 2021-07-21 RX ADMIN — Medication 1 MG: at 01:36

## 2021-07-21 RX ADMIN — ACETAMINOPHEN 650 MG: 325 TABLET, FILM COATED ORAL at 15:48

## 2021-07-21 RX ADMIN — OXYCODONE HYDROCHLORIDE 10 MG: 5 TABLET ORAL at 03:58

## 2021-07-21 RX ADMIN — POLYETHYLENE GLYCOL 3350 17 G: 17 POWDER, FOR SOLUTION ORAL at 07:44

## 2021-07-21 RX ADMIN — OXYCODONE HYDROCHLORIDE 10 MG: 5 TABLET ORAL at 08:45

## 2021-07-21 RX ADMIN — GABAPENTIN 300 MG: 300 CAPSULE ORAL at 11:33

## 2021-07-21 RX ADMIN — AMPICILLIN 2 G: 2 INJECTION, POWDER, FOR SOLUTION INTRAVENOUS at 06:20

## 2021-07-21 RX ADMIN — SIMETHICONE 80 MG: 80 TABLET, CHEWABLE ORAL at 15:48

## 2021-07-21 RX ADMIN — ACETAMINOPHEN 650 MG: 325 TABLET, FILM COATED ORAL at 03:58

## 2021-07-21 RX ADMIN — IBUPROFEN 600 MG: 600 TABLET, FILM COATED ORAL at 12:11

## 2021-07-21 RX ADMIN — SIMETHICONE 80 MG: 80 TABLET, CHEWABLE ORAL at 07:44

## 2021-07-21 NOTE — PLAN OF CARE
Pt A & o x 4, called frequently for care. Asked for pain medication and PRN tylenol was returned to MAR per patient's request.   Good appetite on R/T diet. Denied nausea.  IND walking with IV pole in the room.   Voiding without difficulties in the toilet. Cap on toilet. Denied pads weight and stated bleeding decreased. Pt called due to a blood clot found in urine, which was 17g.   C/o chill and Temp 100.2 oral, tylenol offered, later Temp 99.1 and c/o hot. Ice water offered.   L PIV, infusing.   Call light within reach.   C/o hard to fall asleep around 0020, on call OB provider paged, then Melatonin 1 mg offered around 0140.   IV dilaudid was Discontinued and will change to oral pain med. IV continue infusing stopped by provider.  Plan to go home tomorrow PM. Pt was mentioned about it.

## 2021-07-21 NOTE — LACTATION NOTE
Samantha shares that she no longer plans to breastfeed. She has been pumping about 4 times per day for comfort. She is considering pumping once per day and giving this milk to her daughter.    Samantha has questions about benefits of partial breastmilk feeding, tips to decrease pumping and if possible to maintain 1 pumping per day.     Kaylah also had questions about stopping infants supplemental vitamin D but I referred her to her pediatrician for this recommendation.    Education: tips to wean pumping, signs/symptoms of plugged ducts and mastitis, supply and demand milk production, potential impact of retained placenta on milk supply and pumping supplies.    Samantha requested Medela pump part sanitizing bag and this was tubed up to the unit.

## 2021-07-21 NOTE — PLAN OF CARE
/77 (BP Location: Right arm)   Pulse 71   Temp 98.8  F (37.1  C) (Oral)   Resp 16   SpO2 98%     Aox4. CMs intact. Afebrile. Lower abdominal incision and R groin site WDL, no drainage. Seen by lactation consultant today. Medela pump and fridge in room, writer helped pt sterilize her pump parts with bags from post-partum. See lactation note.   Pain seems to be better managed today. She endorses pain at the incision site and the sides of her abdomen. Oxycodone given as available, gabapentin and ibuprofen given. Provider notified re: +Hcg from 7/18 before pt's surgery.   Mylicon is scheduled, pt endorses some of this pain to be gas. Skin intact ex incisions.   LBM 7/17, BS +. UOP clear, yellow. 2-3 small clots in urine this afternoon, otherwise no bloody show noted.    Denies nausea. Eating well.  Pt appreciated round-the clock dosing from staff and will call for pain meds. IV pain medications discontinued at this point, pt tolerating all PO meds.   Pt has repeated requests and is short with staff. PIV removed.   Plan: Plan is to discharge this afternoon once she is seen by MD. Discharge medications are on the unit.  will pick her up.

## 2021-07-21 NOTE — PROGRESS NOTES
GYN PROGRESS NOTE    Date: 21  HD/POD#: HD4/POD#3 R UAE, POD#2 IR angiogram of uterine vasculature and D&C    24 hour events: Flagyl resumed per recommendation by IR. No issues overnight.    Subjective:  Patient reports some continued pain along the sides of abdomen but better after starting bowel regimen. Passing small clots. No fevers/chills this morning. Tolerating PO intake, denies N/V. She has some lightheadedness and dizziness. Denies chest pain, shortness of breath. Ambulating without difficulty.     Objective:   Patient Vitals for the past 24 hrs:   BP Temp Temp src Pulse Resp SpO2   21 2346 -- 98.6  F (37  C) Oral -- 16 --   21 2319 -- 98.6  F (37  C) Axillary -- -- --   21 -- 99.1  F (37.3  C) Axillary -- -- --   21 -- 100.2  F (37.9  C) -- -- -- --   21 1835 -- -- -- 100 16 100 %   21 1004 108/56 98.8  F (37.1  C) Oral 85 15 97 %         Intake/Output Summary (Last 24 hours) at 2021 0711  Last data filed at 2021 0047  Gross per 24 hour   Intake --   Output 1900 ml   Net -1900 ml     Gen: alert and oriented, resting in bed  Cardio: extremities warm, well perfused  Resp: non-labored breathing on room air  Abdomen: soft, mildly tender to palpation, Pfannenstiel incision clean/dry/intact  Extremities: BLEs non-tender to palpation    Labs:  : Urine culture <10,000 colonies of mixed urogenital amador  : Blood cultures no growth in 1 day      Assessment: 40 year old  POD#20 s/p RLTCS due to failed TOLAC, POD#6 s/p D&C for hematometra/possible retained POC, POD#3 s/p right UAE, POD#2 IR direct angiogram of uterine vasculature and D&C. Treated for presumed endometritis; prophylactic Flagyl for IR procedure resumed. Vital signs stable. Bleeding minimal.    Plan:      FEN: General diet; PO intake adequate, discontinued IV fluids  Pain: Transition to oral medications for pain control today. Continue PRN  Tylenol 650 mg q6h, Gabapentin 300 TID, ibuprofen 600 mg Q6H, oxycodone 5-10 q4h PRN.   Pulm: History of asthma. Mometasone inhaler nightly ordered.  GI: Antiemetics PRN ordered. Simethicone, mirilax and senna per patient request due to reported constipation.  ID: Afebrile for greater than 24 hours. Urine and blood cultures pending. IV antibiotics discontinued at this time. Continue Flagyl.  Psych/Neuro: History of anxiety on fluoxetine at home, continued inpatient. Hydroxyzine added due to poor pain control. Melatonin PRN at night for sleep.      Mauricio Fontana MD  Ob/Gyn Resident, PGY-1   07/21/21 7:12 AM    Staff MD Note    I appreciate the note by Dr. Fontana.  Any necessary changes have been made by me.  I saw and evaluated the patient and agree with the findings and plan of care as documented in the note.  Pain stable, transitioning to oral meds only this morning.  Hgb ordered this morning to ensure stable.  Discussed course she has had to date and discuss these focal accreta/AVM issues are hard to detect prior to having a course as she has and that all interventions that have been done to date were correct with regards to management plan in setting of wanting to retain uterus.  Discussion and clear communication regarding no concerns for intraabdominal bleeding.  She is aware that if she has repeat episode again similar to these last two we would likely recommend proceeding with hysterectomy.  She is hopeful this will not be the case.  Plan to complete 7 day course of Flagyl per IR recommendations.  If does well from pain control perspective on oral regimen alone, plan discharge home later today.   Discussed plan for followup, will facilitate this for her.        Eunice Corrales MD

## 2021-07-21 NOTE — PHARMACY-ADMISSION MEDICATION HISTORY
Admission Medication History Completed by Pharmacy    See [facility] Admission Navigator for allergy information, preferred outpatient pharmacy, prior to admission medications and immunization status.     Medication History Sources:     Patient    Pharmacy fill history via TopLog    Changes made to PTA medication list (reason):    Added: None    Deleted: None    Changed: None    Additional Information:    None    Prior to Admission medications    Medication Sig Last Dose Taking? Auth Provider   acetaminophen (TYLENOL) 325 MG tablet Take 3 tablets (975 mg) by mouth every 6 hours  Yes Anca Aguilar MD   albuterol (PROAIR HFA/PROVENTIL HFA/VENTOLIN HFA) 108 (90 Base) MCG/ACT inhaler albuterol sulfate HFA 90 mcg/actuation aerosol inhaler  Yes Reported, Patient   doxylamine (UNISOM) 25 MG TABS tablet Take 25 mg by mouth At Bedtime  Yes Reported, Patient   FLUoxetine (PROZAC) 10 MG capsule Take 30 mg by mouth daily.  Yes Reported, Patient   gabapentin (NEURONTIN) 300 MG capsule Take 1 capsule (300 mg) by mouth 3 times daily as needed (incisional pain) 7/17/2021 at Unknown time Yes Rina Mcdonald MD   hydrOXYzine (VISTARIL) 25 MG capsule Take 1 capsule (25 mg) by mouth nightly as needed for itching or other (sleep)  Yes Anca Aguilar MD   ibuprofen (ADVIL/MOTRIN) 600 MG tablet Take 1 tablet (600 mg) by mouth every 6 hours  Yes Anca Aguilar MD   magnesium 250 MG tablet Take 1 tablet by mouth daily  Yes Reported, Patient   mometasone (ASMANEX TWISTHALER) 220 MCG/INH inhaler Inhale 1 puff into the lungs every evening   Yes Reported, Patient   multivitamin w/minerals (MULTI-VITAMIN) tablet Take 1 tablet by mouth daily  Yes Reported, Patient   Omega-3 Fatty Acids (FISH OIL PO) Take by mouth daily  Yes Reported, Patient   oxyCODONE (ROXICODONE) 5 MG tablet Take 1 tablet (5 mg) by mouth every 4 hours as needed for severe pain  Patient taking differently: Take 5-10 mg by mouth every 4 hours as needed for  severe pain   Yes Anca Aguilar MD   polyethylene glycol (MIRALAX) 17 GM/Dose powder Take 17 g by mouth daily  Yes Anca Aguilar MD   Prenatal Vit-Fe Fumarate-FA (PRENATAL MULTIVITAMIN W/IRON) 27-0.8 MG tablet Take 1 tablet by mouth daily  Yes Reported, Patient   senna-docusate (SENOKOT-S/PERICOLACE) 8.6-50 MG tablet Take 2 tablets by mouth 2 times daily  Yes Anca Aguilar MD   simethicone (MYLICON) 80 MG chewable tablet Take 1 tablet (80 mg) by mouth 4 times daily as needed for other (gas)  Yes Anca Aguilar MD       Date completed: 07/20/21    Medication history completed by: Lilibeth Falk, [facility]

## 2021-07-22 NOTE — PLAN OF CARE
All pt questions answered for discharge. Communicated with OB resident that pt can discharge without being seen by physician again as planned. Pt given all medication and AVS information. Pt VSS and medically stable for discharge. Clarified with patient that [facility] will call with follow up for US in 6 weeks. Pt understood all information and was compliant. Safely was transported with staff and family in wheelchair.

## 2021-07-25 LAB
BACTERIA BLD CULT: NO GROWTH
BACTERIA BLD CULT: NO GROWTH

## 2021-07-27 ENCOUNTER — TELEPHONE (OUTPATIENT)
Dept: OBGYN | Facility: CLINIC | Age: 41
End: 2021-07-27

## 2021-07-28 ENCOUNTER — LAB (OUTPATIENT)
Dept: LAB | Facility: CLINIC | Age: 41
End: 2021-07-28
Attending: OBSTETRICS & GYNECOLOGY
Payer: COMMERCIAL

## 2021-07-28 ENCOUNTER — ALLIED HEALTH/NURSE VISIT (OUTPATIENT)
Dept: OBGYN | Facility: CLINIC | Age: 41
End: 2021-07-28
Attending: OBSTETRICS & GYNECOLOGY
Payer: COMMERCIAL

## 2021-07-28 VITALS
BODY MASS INDEX: 20.68 KG/M2 | SYSTOLIC BLOOD PRESSURE: 112 MMHG | HEART RATE: 77 BPM | TEMPERATURE: 98.9 F | WEIGHT: 138.8 LBS | DIASTOLIC BLOOD PRESSURE: 76 MMHG

## 2021-07-28 DIAGNOSIS — R19.5 LOOSE STOOLS: ICD-10-CM

## 2021-07-28 DIAGNOSIS — Z86.19 HISTORY OF CLOSTRIDIOIDES DIFFICILE INFECTION: ICD-10-CM

## 2021-07-28 PROBLEM — O09.529 HIGH-RISK PREGNANCY, ELDERLY MULTIGRAVIDA, UNSPECIFIED TRIMESTER: Status: RESOLVED | Noted: 2021-06-17 | Resolved: 2021-07-28

## 2021-07-28 PROBLEM — Z91.89 AT RISK FOR INEFFECTIVE BREASTFEEDING: Status: RESOLVED | Noted: 2021-06-17 | Resolved: 2021-07-28

## 2021-07-28 PROBLEM — Z78.9 CONCEIVED BY IN VITRO FERTILIZATION: Status: RESOLVED | Noted: 2021-06-21 | Resolved: 2021-07-28

## 2021-07-28 LAB
ANION GAP SERPL CALCULATED.3IONS-SCNC: 4 MMOL/L (ref 3–14)
B-HCG SERPL-ACNC: 4 IU/L (ref 0–5)
BASOPHILS # BLD AUTO: 0.1 10E3/UL (ref 0–0.2)
BASOPHILS NFR BLD AUTO: 1 %
BUN SERPL-MCNC: 11 MG/DL (ref 7–30)
CALCIUM SERPL-MCNC: 8.4 MG/DL (ref 8.5–10.1)
CHLORIDE BLD-SCNC: 98 MMOL/L (ref 94–109)
CO2 SERPL-SCNC: 28 MMOL/L (ref 20–32)
CREAT SERPL-MCNC: 0.72 MG/DL (ref 0.52–1.04)
EOSINOPHIL # BLD AUTO: 0.1 10E3/UL (ref 0–0.7)
EOSINOPHIL NFR BLD AUTO: 1 %
ERYTHROCYTE [DISTWIDTH] IN BLOOD BY AUTOMATED COUNT: 12.2 % (ref 10–15)
GFR SERPL CREATININE-BSD FRML MDRD: >90 ML/MIN/1.73M2
GLUCOSE BLD-MCNC: 79 MG/DL (ref 70–99)
HCT VFR BLD AUTO: 30.3 % (ref 35–47)
HGB BLD-MCNC: 10 G/DL (ref 11.7–15.7)
IMM GRANULOCYTES # BLD: 0.1 10E3/UL
IMM GRANULOCYTES NFR BLD: 1 %
LYMPHOCYTES # BLD AUTO: 2 10E3/UL (ref 0.8–5.3)
LYMPHOCYTES NFR BLD AUTO: 23 %
MCH RBC QN AUTO: 34.5 PG (ref 26.5–33)
MCHC RBC AUTO-ENTMCNC: 33 G/DL (ref 31.5–36.5)
MCV RBC AUTO: 105 FL (ref 78–100)
MONOCYTES # BLD AUTO: 0.5 10E3/UL (ref 0–1.3)
MONOCYTES NFR BLD AUTO: 5 %
NEUTROPHILS # BLD AUTO: 6.2 10E3/UL (ref 1.6–8.3)
NEUTROPHILS NFR BLD AUTO: 69 %
NRBC # BLD AUTO: 0 10E3/UL
NRBC BLD AUTO-RTO: 0 /100
PLATELET # BLD AUTO: 372 10E3/UL (ref 150–450)
POTASSIUM BLD-SCNC: 4.8 MMOL/L (ref 3.4–5.3)
RBC # BLD AUTO: 2.9 10E6/UL (ref 3.8–5.2)
SODIUM SERPL-SCNC: 130 MMOL/L (ref 133–144)
WBC # BLD AUTO: 8.9 10E3/UL (ref 4–11)

## 2021-07-28 PROCEDURE — 85025 COMPLETE CBC W/AUTO DIFF WBC: CPT

## 2021-07-28 PROCEDURE — 36415 COLL VENOUS BLD VENIPUNCTURE: CPT

## 2021-07-28 PROCEDURE — 80048 BASIC METABOLIC PNL TOTAL CA: CPT

## 2021-07-28 PROCEDURE — G0463 HOSPITAL OUTPT CLINIC VISIT: HCPCS

## 2021-07-28 PROCEDURE — 84702 CHORIONIC GONADOTROPIN TEST: CPT

## 2021-07-28 PROCEDURE — 99214 OFFICE O/P EST MOD 30 MIN: CPT | Mod: 24 | Performed by: OBSTETRICS & GYNECOLOGY

## 2021-07-28 ASSESSMENT — PAIN SCALES - GENERAL: PAINLEVEL: MILD PAIN (2)

## 2021-07-28 NOTE — NURSING NOTE
7-17-21 dillation and curtage   Still bleeding like a period  Changing two pads a day  Bright red  And having cramping as well.  Feeling really dizzy as well     Yesterday  hgb 8.4  Started iron yesterday.   Low grade fevers at home  99.5 at home today  Here it is 98.9   Would like c-dif  Testing today

## 2021-07-28 NOTE — TELEPHONE ENCOUNTER
Patient paged on-call midwife to discuss concerns she has.  She was seen by a provider at an outside facility today for concerns about not feeling well.  She has stopped breast feeding since her re-admission.    Symptoms she reported to on-call CNM were shooting nipple pain, dizziness and fever.  Her hemoglobin was noted to be low secondary to acute blood loss following retained products.  She was not started on iron supplements, her provider instructed her to start them today.    Her provider today suggested that her shooting nipple pain could be yeast and did prescribe Fluconazole, the patient is wondering if she starts this medication it might mask any possible uterine infection with her fever.  Reassurance given that Fluconazole is an antifungal, ductal yeast would not cause a fever.      Patient states that her son has Covid, he is with grandparents.  She had Covid test around 1800 today, results pending.  Her  had a Covid test  A few days ago and was negative.  Her  has also tested negative.  Patient is wondering who should be caring for her baby.  CNM suggested that if she is not feeling well, that her  should be caring for her baby as she is not breast feeding.      Reviewed warning signs of worsening symptoms and when to present to the ED.  Patient states that she has a clinic appointment tomorrow at [facility] to see Dr. Corrales.      ANDREY Marte CNM

## 2021-07-29 ENCOUNTER — LAB (OUTPATIENT)
Dept: LAB | Facility: CLINIC | Age: 41
End: 2021-07-29
Attending: ADVANCED PRACTICE MIDWIFE
Payer: COMMERCIAL

## 2021-07-29 ENCOUNTER — OFFICE VISIT (OUTPATIENT)
Dept: OBGYN | Facility: CLINIC | Age: 41
End: 2021-07-29
Attending: ADVANCED PRACTICE MIDWIFE
Payer: COMMERCIAL

## 2021-07-29 VITALS — SYSTOLIC BLOOD PRESSURE: 112 MMHG | TEMPERATURE: 98.6 F | HEART RATE: 85 BPM | DIASTOLIC BLOOD PRESSURE: 80 MMHG

## 2021-07-29 DIAGNOSIS — R19.5 LOOSE STOOLS: ICD-10-CM

## 2021-07-29 DIAGNOSIS — B37.89 CANDIDIASIS OF BREAST: Primary | ICD-10-CM

## 2021-07-29 DIAGNOSIS — Z86.19 HISTORY OF CLOSTRIDIOIDES DIFFICILE INFECTION: ICD-10-CM

## 2021-07-29 LAB — C DIFF TOX B STL QL: NEGATIVE

## 2021-07-29 PROCEDURE — 99213 OFFICE O/P EST LOW 20 MIN: CPT | Mod: 24 | Performed by: ADVANCED PRACTICE MIDWIFE

## 2021-07-29 PROCEDURE — 87493 C DIFF AMPLIFIED PROBE: CPT

## 2021-07-29 PROCEDURE — G0463 HOSPITAL OUTPT CLINIC VISIT: HCPCS

## 2021-07-29 ASSESSMENT — PAIN SCALES - GENERAL: PAINLEVEL: MODERATE PAIN (5)

## 2021-07-29 NOTE — NURSING NOTE
Fever 100.5 usual fever  Not feeling well  Both breasts are painful.  No redness on the breasts   Took tylenol around 8:00am  Today.

## 2021-07-29 NOTE — LETTER
2021       RE: Samantha Dudley  [address]  [place] [address]     Dear Colleague,    Thank you for referring your patient, Samantha Dudley, to the [facility] [place] [facility]. Please see a copy of my visit note below.    Subjective:  Samantha Dudley 40 year old year old female, , who presents with complaints of bilateral breast pain.    Pt reports breast pain that began 3-4 days ago, accompanied by low grade fever.    Pain is described as shooting pain that begins in nipple and radiates into breast.  Denies redness, warmth, or abnormal discharge from breasts.  Is no longer pumping or breastfeeding.  Able to manually express breast milk from L breast.    Was prescribed fluconazole by outside provider, which was started 2 days ago.     Was seen for nurse visit yesterday.  Had CBC completed at that time with normal WBC yesterday.  Has had complex course after RCS on 21.  Readmitted for D&C x 2.  Overall not sleeping well. Trying to stay well-hydrated.  Feels she may be getting a cold. Had negative COVID test, repeating test today.  Concerns re: getting  sick.     Objective:   /80   Pulse 85   Temp 98.6  F (37  C)   She appears well, afebrile.    Breasts: normal without suspicious masses, skin changes or axillary nodes, symmetric fibrous changes in both upper outer quadrants.   L breast: Some fullness noted in L lateral breast. Non-tender, no redness or warmth noted.  Small amount of breast milk expressed   R breast: soft, nontender, no suspicious masses, skin changes or axillary nodes    Assessment:   Candida infection of breast    Plan:   Recommended to continue fluconazole treatment at this time. Reviewed course of treatment, expectations for improvement in symptoms.   Discussed low suspicion for mastitis or endometritis d/t afebrile, normal WBC, and clinical presentation of symptoms.   Encouraged  continued rest, hydration, etc.  Return to clinic prn if symptoms persist or worsen. Planning visit with Dr. Corrales in 1 week.  Msg sent to  to assist with scheduling.  Pt verbalized understanding and agreement to plan.     - ANDREY Lozano CNM

## 2021-07-29 NOTE — PROGRESS NOTES
Subjective:  Samantha Dudley 40 year old year old female, , who presents with complaints of bilateral breast pain.    Pt reports breast pain that began 3-4 days ago, accompanied by low grade fever.    Pain is described as shooting pain that begins in nipple and radiates into breast.  Denies redness, warmth, or abnormal discharge from breasts.  Is no longer pumping or breastfeeding.  Able to manually express breast milk from L breast.    Was prescribed fluconazole by outside provider, which was started 2 days ago.     Was seen for nurse visit yesterday.  Had CBC completed at that time with normal WBC yesterday.  Has had complex course after RCS on 21.  Readmitted for D&C x 2.  Overall not sleeping well. Trying to stay well-hydrated.  Feels she may be getting a cold. Had negative COVID test, repeating test today.  Concerns re: getting  sick.     Objective:   /80   Pulse 85   Temp 98.6  F (37  C)   She appears well, afebrile.    Breasts: normal without suspicious masses, skin changes or axillary nodes, symmetric fibrous changes in both upper outer quadrants.   L breast: Some fullness noted in L lateral breast. Non-tender, no redness or warmth noted.  Small amount of breast milk expressed   R breast: soft, nontender, no suspicious masses, skin changes or axillary nodes    Assessment:   Candida infection of breast    Plan:   Recommended to continue fluconazole treatment at this time. Reviewed course of treatment, expectations for improvement in symptoms.   Discussed low suspicion for mastitis or endometritis d/t afebrile, normal WBC, and clinical presentation of symptoms.   Encouraged continued rest, hydration, etc.  Return to clinic prn if symptoms persist or worsen. Planning visit with Dr. Corrales in 1 week.  Msg sent to  to assist with scheduling.  Pt verbalized understanding and agreement to plan.     - ANDREY Lozano CNM

## 2021-07-30 ENCOUNTER — OFFICE VISIT (OUTPATIENT)
Dept: OBGYN | Facility: CLINIC | Age: 41
End: 2021-07-30
Attending: OBSTETRICS & GYNECOLOGY
Payer: COMMERCIAL

## 2021-07-30 ENCOUNTER — TELEPHONE (OUTPATIENT)
Dept: OBGYN | Facility: CLINIC | Age: 41
End: 2021-07-30

## 2021-07-30 ENCOUNTER — ANCILLARY PROCEDURE (OUTPATIENT)
Dept: ULTRASOUND IMAGING | Facility: CLINIC | Age: 41
End: 2021-07-30
Attending: OBSTETRICS & GYNECOLOGY
Payer: COMMERCIAL

## 2021-07-30 VITALS
BODY MASS INDEX: 20.56 KG/M2 | WEIGHT: 138 LBS | SYSTOLIC BLOOD PRESSURE: 104 MMHG | HEART RATE: 87 BPM | DIASTOLIC BLOOD PRESSURE: 72 MMHG | TEMPERATURE: 98 F

## 2021-07-30 DIAGNOSIS — Z86.19 HISTORY OF CLOSTRIDIOIDES DIFFICILE INFECTION: ICD-10-CM

## 2021-07-30 DIAGNOSIS — F51.02 ADJUSTMENT INSOMNIA: ICD-10-CM

## 2021-07-30 DIAGNOSIS — Z98.891 S/P CESAREAN SECTION: ICD-10-CM

## 2021-07-30 DIAGNOSIS — R19.5 LOOSE STOOLS: ICD-10-CM

## 2021-07-30 PROCEDURE — 76830 TRANSVAGINAL US NON-OB: CPT | Mod: 26 | Performed by: OBSTETRICS & GYNECOLOGY

## 2021-07-30 PROCEDURE — 99214 OFFICE O/P EST MOD 30 MIN: CPT | Mod: GC | Performed by: OBSTETRICS & GYNECOLOGY

## 2021-07-30 PROCEDURE — 76830 TRANSVAGINAL US NON-OB: CPT

## 2021-07-30 PROCEDURE — G0463 HOSPITAL OUTPT CLINIC VISIT: HCPCS | Mod: 25

## 2021-07-30 RX ORDER — TEMAZEPAM 15 MG/1
15 CAPSULE ORAL
Qty: 30 CAPSULE | Refills: 0 | Status: SHIPPED | OUTPATIENT
Start: 2021-07-30

## 2021-07-30 ASSESSMENT — PAIN SCALES - GENERAL: PAINLEVEL: MODERATE PAIN (4)

## 2021-07-30 NOTE — PROGRESS NOTES
"Gynecology Visit Note  7/28/2021    Reason for visit: F/u from recet hospitalization    S: Patient is a 39 yo  who is following up today from recent hospitalization.  As a summary of her course to date, patient presented as a patient of our CNM team on 6/30/2021 for IOL recommended by M at 40w0d for pregnancy complicated by fetal hemivertebrae (spinal fusion anomaly).  Patient's pregnancy was complicated by history of CS desiring TOLAC, IVF pregnancy with donor egg in setting of POI, AMA, asthma,and history of depression and anxiety.  Patient went through IOL and unfortunately developed a Category II fetal heart rate tracing after reaching complete dilation with inability to manually rotate baby from OT position and as such, proceeded with RLTCS on 7/1/2021.  Her initial postoperative course was uncomplicated and she was discharged on postoperative day 3.  On POD#13 the patient presented to the ED with heavy bleeding and cramping and an US at that time showed an endometrial stripe of 24 mm that was heterogenous and with vascularity with concerns for RPOC.  Patient underwent a Hysteroscopy with Myosure and due to amount of tissue that was visualized at time of hysteroscopy, conversion made to complete Suction D&C.  Patient recovered well from this procedure and had minimal bleeding following procedure and was discharged post-operatively the same day.  Pathology from this procedure showed \"fragments of blood and fibrin clot, myometrium, endometrium, necrotic decidua and necrotic and sclerotic chorionic villi\".  On POD#16, patient called in with concerns of increasing RLQ pain.  Was recommended to come to the ED for evaluation.  CT scan at that time showed reaccumulation of hematometra and concerns for possible uterine AVM/focal accreta and the fundus.  Recommendation for UAE and possible D&C following for removal of hematometra.  Patient underwent IR procedure on 7/18/2021 and able to embolize right uterine " "artery with gelfoam, but were unable to embolize left uterine artery as was branching off the left ovarian artery.  Discussed findings with patient and possibility of IR to directly inject left uterine artery in OR while patient under anesthesia with D&C to follow.  On 7/19/2021 patient taken to OR under GETA and IR present for direct transvaginal and transabdominal access and angiogram of uterine wall vasculature.  Did see significantly reduced vascularity compared to images from 7/18/2021 and no further embolization completed.  The patient then underwent Suction D&C.  Pathology from this procedure showed \"fragments of blood clot, myometrium, implantation site, and sclerotic and necrotic chorionic villi\".  Was started on prophylactic flagyl per IR reccs.  That evening following procedure patient developed fevers to Tmax 103.2 F.  Concern for evolving endometritis.  Started on amp/gent.  Patient declined clindamycin in addition to these antibiotics secondary to her history of C Diff in past related to antibiotic use.  Blood culture completed at that time which returned no growth.  Patient did struggle with pain control, ultimately being managed with Tylenol, Ibuprofen, Gabapentin and Oxycoodone.  She also was having issues with constipation and so given bowel regimen to help with this.  She was given Atarax and Unisom to help with sleep.  IV antibiotics were discontinued after 24 hours afebrile.  She was discharged on 7/21/2021 once she had adequate pain control with oral pain medications, was tolerating regular, was emptying bladder and had return of bowel function.    Patient had virtual visit at [facility] on 7/23/2021 to discuss her course to date and to get their opinions on things and management recommendations.  They agreed likely accreta spectrum causing her course to date, also consideration for AVM.  They recommended expectant management with plan for MRI 2 weeks from her last procedure and followup CBC and HCG " to help guide further management plans.  On 7/27/2021 she had concerns about breast pain and was diagnosed by her primary OBGYN whom she saw prior to her [facility]STO to [facility] for fetal anomalies with ductal yeast of the breast. She started on Rx of diflucan on 7/27/2021.  She was also started on iron due to complaints of dizziness and her Hgb of 8.4 at that time.  She also spoke with one of our CNMs that same day about her breast concerns who agreed with flucoanzole course.  Of note, patient's son was diagnosed with COVID, likely got from grandparents whom he was staying with while patient was in hospital.  Obviously very distressing and patient and her  and infant daughter are also going through the testing process for COVID now.       Today, patient notes that she continues to have bleeding requiring 2 pads per day, no heavy clots.  Pain well managed.  Having low grade temperature, but no fever.  She is continuing to struggle with feeling dizzy and lightheaded, like something is off.  Did initiate iron.  She would like exam of her breasts to ensure no concerns for mastitis.  Has not  since she was in the hospital this last admission.  Incision healing well but does notice some swelling in the mons area and wondering if anything to be concerned about.  Patient initially with difficulty with BM, now having looser stools and concerned for possible C Diff given her history.  Patient would like to discuss next recommended steps for management.    O:  /76   Pulse 77   Temp 98.9  F (37.2  C)   Wt 63 kg (138 lb 12.8 oz)   BMI 20.68 kg/m       General: NAD, A&Ox3  Resp; Nonlabored breathing  Breasts: Symmetrical, no erythema, fluctuance or concerning discharge from nipples bilaterally  Abdomen: Soft, Minimal TTP throughout abdomen, Nondistended, No guarding, No rebound  Incision: Pfannenstiel, C/D/I, swelling along mons, but no erythema or fluctuance     A/P: 39 yo  presents for followup from recent  hospitalization s/p complicated course following RLTCS and likely focal accreta versus AVM  1) Focal accreta/AVM: Patient doing well today from this perspective.  Discussed repeating Pelvic US in 1 week to visualize for any reaccumulation of hematometra.  Given no concerning symptoms, would recommend expectant management at this time.  If remains stable plan for another attempt at hysteroscopic resection at 6 week yang if patient continues to desire future fertility.  If patient presents back to ED or has worsening clinical condition, would recommend proceeding with hysterectomy at that time as this would be definitive management of her concerns.  Patient also following with [facility] and they have plans for MRI and she appreciates having input from all providers regarding her condition.  Will repeat HCG today to see what value is now.  2) Breast pain: No concerns for mastitis or abscess on exam today, continue fluconazole to treat ductal yeast  3) Lightheadedness/Low grade fever (<100 F): Did have anemia related to procedures, taking iron supplementation now, had not been given at discharge due to challenges with bowel function at that time  Will get CBC/Plts and BMP today to ensure no acute concerns that need to be addressed.    4) Loose stool: Given history of C Diff and recent antibiotic use will get C Diff test today   5) RTC in 1 week for TVUS to further assess accreta/AVM, call earlier with any concerns    Eunice Corrales MD

## 2021-07-30 NOTE — LETTER
2021       RE: Umang Dudley  [address]  [place] [facility] [address]     Dear Colleague,    Thank you for referring your patient, Umang Dudley, to the [facility]. Please see a copy of my visit note below.    [facility]  Gynecology Visit    Reason for Visit: discuss US findings and management options for our working diagnosis of focal placenta accreta and ongoing uterine bleeding    HPI:    Umang Dudley is a 40 year old  s/p hysteroscopy with morcellation and suction D&C followed by right UAE, IR angiogram of uterine vasculature and repeat suction D&C, here to discuss her US findings and management options regarding the focal placenta accreta.    She was seen by Dr. Corrales earlier this week. At that time, she was still bleeding like a period requiring her to change her pads twice daily. Her flow was bright red, and she was having cramping as well. Other symptoms included feeling really dizzy. At home she reports low grade fevers of up to 99.5. She also has complaints of loose stools. She was subsequently tested for C. Diff, which was negative.    On , her hgb was 8.4, improved to 10 on . She remains on iron supplementation.    Today, she had a TVUS which showed improved hematometra (1.9 cm from 4cm). She has questions about next steps for management. She also states she has an MRI and appointment next week with [facility] for a second opinion. She also notes issues with sleep.     Lab Results   Component Value Date    PAP NIL 2014    PAP NIL 2013    PAP NIL 2012     OBHx  OB History    Para Term  AB Living   2 2 2 0 0 2   SAB TAB Ectopic Multiple Live Births   0 0 0 0 2      # Outcome Date GA Lbr Sanchez/2nd Weight Sex Delivery Anes PTL Lv   2 Term 21 40w1d   F CS-LTranv Nitrous, EPI N KISHORE      Complications: Fetal Intolerance      Name: JASPERFEMALE-UMANG       "Apgar1: 3  Apgar5: 5   1 Term 10/14/16 39w0d   M -SEC EPI N KISHORE      Complications: Face presentation of fetus      Obstetric Comments   Arrived at 8cm, epidural, then face presentation, mec, CS.   PPH?, No GDM, HTN, --pp anxiety, therapist,  x 7       Past Medical History:   Diagnosis Date     Asthma      Brachial neuritis 2011    resolved with P     Cervical dysplasia 2010    treated in [place], normal f/u paps     Eating disorder     previously on Prozac     Generalized anxiety disorder 2013    Problem resolved at pt request  that it be removed from problem list.      Hoarseness      Menarche age 14    cycles q mo x 7d, heavy, w cramps     Nasal congestion      Neck pain 2011     Oral contraceptive use age 18    for cycle control     Sore throat      Trouble breathing     At night       Past Surgical History:   Procedure Laterality Date      SECTION N/A 2021    Procedure:  SECTION;  Surgeon: Rina Mcdonald MD;  [facility]     CONIZATION LEEP      \"most painful thing I've ever had\"     DILATION AND CURETTAGE SUCTION N/A 2021    Procedure: DILATION AND CURETTAGE, UTERUS, USING SUCTION WITH CONCURRENT FLUOROSCOPY AND UTERINE ARTERY EMBOLIZATION,LEFT;  Surgeon: Lynette Cantrell MD;  [facility] OR     DILATION AND CURETTAGE, OPERATIVE HYSTEROSCOPY WITH MORCELLATOR, COMBINED N/A 2021    Procedure: HYSTEROSCOPY, WITH SUCTION DILATION AND CURETTAGE OF UTERUS USING MORCELLATOR;  Surgeon: Nini Gallagher MD;  [facility] OR     IR VISCERAL ANGIOGRAM  2021     perianal lesion excision  child    scar on R @ 2:00 -- benign lesion      SURGICAL RADIOLOGY PROCEDURE N/A 2021    Procedure: Percutaneous Access into abnormal Uterine Vessels and with angiogram  ;  Surgeon: Faye Maurice MD;  [facility] OR     [place] teeth extraction  18         Current Outpatient Medications:      acetaminophen (TYLENOL) " 325 MG tablet, Take 3 tablets (975 mg) by mouth every 6 hours, Disp: 100 tablet, Rfl: 0     albuterol (PROAIR HFA/PROVENTIL HFA/VENTOLIN HFA) 108 (90 Base) MCG/ACT inhaler, albuterol sulfate HFA 90 mcg/actuation aerosol inhaler, Disp: , Rfl:      docusate sodium (COLACE) 100 MG capsule, Take 1 capsule (100 mg) by mouth 2 times daily as needed for constipation, Disp: 100 capsule, Rfl: 0     doxylamine (UNISOM) 25 MG TABS tablet, Take 25 mg by mouth At Bedtime, Disp: , Rfl:      FLUoxetine (PROZAC) 10 MG capsule, Take 30 mg by mouth daily., Disp: , Rfl:      gabapentin (NEURONTIN) 300 MG capsule, Take 1 capsule (300 mg) by mouth 3 times daily, Disp: 21 capsule, Rfl: 0     hydrOXYzine (VISTARIL) 25 MG capsule, Take 2-4 capsules ( mg) by mouth nightly as needed (sleep), Disp: 30 capsule, Rfl: 0     ibuprofen (ADVIL/MOTRIN) 600 MG tablet, Take 1 tablet (600 mg) by mouth every 6 hours, Disp: 60 tablet, Rfl: 0     magnesium 250 MG tablet, Take 1 tablet by mouth daily, Disp: , Rfl:      mometasone (ASMANEX TWISTHALER) 220 MCG/INH inhaler, Inhale 1 puff into the lungs every evening , Disp: , Rfl:      multivitamin w/minerals (MULTI-VITAMIN) tablet, Take 1 tablet by mouth daily, Disp: , Rfl:      Omega-3 Fatty Acids (FISH OIL PO), Take by mouth daily, Disp: , Rfl:      oxyCODONE (ROXICODONE) 5 MG tablet, Take 1-2 tablets (5-10 mg) by mouth every 4 hours as needed for severe pain, Disp: 30 tablet, Rfl: 0     polyethylene glycol (MIRALAX) 17 GM/Dose powder, Take 17 g by mouth daily, Disp: 510 g, Rfl: 1     senna-docusate (SENOKOT-S/PERICOLACE) 8.6-50 MG tablet, Take 2 tablets by mouth 2 times daily, Disp: 120 tablet, Rfl: 1     simethicone (MYLICON) 80 MG chewable tablet, Take 1 tablet (80 mg) by mouth 4 times daily as needed for other (gas), Disp: 30 tablet, Rfl: 1    Allergies   Allergen Reactions     Other [No Clinical Screening - See Comments] Anaphylaxis     Triamenic cough syrup.  Per patients father when she was 5  y.o.     Cat Hair Extract Itching       Family History   Problem Relation Age of Onset     Lipids Father      Hypertension Father 72     Breast Cancer Maternal Aunt 50     Ovarian Cancer Mother 58        recurrance Xs 4     Depression Mother 50     Alcohol/Drug Paternal Grandfather      Depression Sister 24     Depression Brother 24     C.A.D. No family hx of      Diabetes No family hx of      Cerebrovascular Disease No family hx of      Cancer - colorectal No family hx of      Prostate Cancer No family hx of      Thyroid Disease No family hx of        Social History     Socioeconomic History     Marital status:      Spouse name: kaushik     Number of children: 1     Years of education: Not on file     Highest education level: Not on file   Occupational History     Not on file   Tobacco Use     Smoking status: Never Smoker     Smokeless tobacco: Never Used   Substance and Sexual Activity     Alcohol use: Yes     Comment: 2-3 drinks 1x/wk max     Drug use: No     Sexual activity: Yes     Partners: Male     Birth control/protection: OCP   Other Topics Concern      Service No     Blood Transfusions No     Caffeine Concern No     Occupational Exposure No     Hobby Hazards No     Sleep Concern Yes     Comment: follows with psychiatry     Stress Concern Yes     Comment: broke off engagement last week     Weight Concern Yes     Comment: 7 # weight loss in 6 weeks     Special Diet Yes     Comment: adding supplement     Back Care No     Exercise Yes     Comment: tries to walk,     Bike Helmet Yes     Seat Belt Yes     Self-Exams No   Social History Narrative    Health Care Maintenance:    Last Pap:2/2011    Vaccinations:flu shot this year, tetanus up to date    TSH:not recent    Fasting glucose:hasn't had    Lipids:hasn't had    Mammogram:n/a    Colonoscopy:n/a    Dexa:n/a        How much exercise per week? Once a week, walk/run, sporting aerobic    How much calcium per day? Glass a milk a day       How much  caffeine per day? none    How much vitamin D per day? Don't take it, multivitamin, whatever the sun gives her    Do you/your family wear seatbelts?  Yes    Do you/your family use safety helmets? Yes    Do you/your family use sunscreen? Yes    Do you/your family keep firearms in the home? No    Do you/your family have a smoke detector(s)? Yes        Do you feel safe in your home? Yes    Has anyone ever touched you in an unwanted manner? No         Jose CRYSTAL LPN 2013                             Social Determinants of Health     Financial Resource Strain:      Difficulty of Paying Living Expenses:    Food Insecurity:      Worried About Running Out of Food in the Last Year:      Ran Out of Food in the Last Year:    Transportation Needs:      Lack of Transportation (Medical):      Lack of Transportation (Non-Medical):    Physical Activity:      Days of Exercise per Week:      Minutes of Exercise per Session:    Stress:      Feeling of Stress :    Social Connections:      Frequency of Communication with Friends and Family:      Frequency of Social Gatherings with Friends and Family:      Attends Anabaptism Services:      Active Member of Clubs or Organizations:      Attends Club or Organization Meetings:      Marital Status:    Intimate Partner Violence:      Fear of Current or Ex-Partner:      Emotionally Abused:      Physically Abused:      Sexually Abused:        ROS: 10-Point ROS negative except as noted in HPI    Physical Exam  /72   Pulse 87   Temp 98  F (36.7  C)   Wt 62.6 kg (138 lb)   BMI 20.56 kg/m    Gen: Well-appearing, NAD  HEENT: Normocephalic, atraumatic  Pulm: Breathing comfortably on room air  Ultrasound results reviewed in detail.       Assessment/Plan:  Samantha Dudley is a 40 year old  female here to discuss TVUS findings and next steps regarding focal placenta accreta and ongoing uterine bleeding.     Options reviewed and include:  Expectant management with possible hysteroscopic  "resection in the future   Methotrexate high dose therapy with leucovorin rescue with possible hysteroscopic resection in the future  Hysterectomy  Consult with [facility] for second opinion    Encouraged to keep MRI and second opinion with [facility]. We will review their findings and suggestions.    Temazepam 15 mg ordered per patient request for troubles with sleeping.    Mauricio Fontana MD, MPH  OB/GYN Resident, PGY-1  07/30/21 12:39 PM    I have seen and examined the patient with the resident. I have reviewed, edited, and agree with the note.   I have personally counseled the patient. Patient is reluctant to \"keep trying things and still end up with a hysterectomy.\" We discussed this possibility in this scenario.    Total visit time was 35 minutes with 25 minutes spent in counseling and coordination of care for focal placenta accreta.    Lianne Badillo MD      "

## 2021-07-30 NOTE — PROGRESS NOTES
[facility]  Gynecology Visit    Reason for Visit: discuss US findings and management options for our working diagnosis of focal placenta accreta and ongoing uterine bleeding    HPI:    Umang Dudley is a 40 year old  s/p hysteroscopy with morcellation and suction D&C followed by right UAE, IR angiogram of uterine vasculature and repeat suction D&C, here to discuss her US findings and management options regarding the focal placenta accreta.    She was seen by Dr. Corrales earlier this week. At that time, she was still bleeding like a period requiring her to change her pads twice daily. Her flow was bright red, and she was having cramping as well. Other symptoms included feeling really dizzy. At home she reports low grade fevers of up to 99.5. She also has complaints of loose stools. She was subsequently tested for C. Diff, which was negative.    On , her hgb was 8.4, improved to 10 on . She remains on iron supplementation.    Today, she had a TVUS which showed improved hematometra (1.9 cm from 4cm). She has questions about next steps for management. She also states she has an MRI and appointment next week with [facility] for a second opinion. She also notes issues with sleep.     Lab Results   Component Value Date    PAP NIL 2014    PAP NIL 2013    PAP NIL 2012     OBHx  OB History    Para Term  AB Living   2 2 2 0 0 2   SAB TAB Ectopic Multiple Live Births   0 0 0 0 2      # Outcome Date GA Lbr Sanchez/2nd Weight Sex Delivery Anes PTL Lv   2 Term 21 40w1d   F CS-LTranv Nitrous, EPI N KISHORE      Complications: Fetal Intolerance      Name: JASPER,FEMALE-UMANG      Apgar1: 3  Apgar5: 5   1 Term 10/14/16 39w0d   M -SEC EPI N KISHORE      Complications: Face presentation of fetus      Obstetric Comments   Arrived at 8cm, epidural, then face presentation, mec, CS.   PPH?, No GDM, HTN, --pp anxiety, therapist,  x 7       Past Medical History:   Diagnosis Date      "Asthma      Brachial neuritis 2011    resolved with P     Cervical dysplasia 2010    treated in [place], normal f/u paps     Eating disorder     previously on Prozac     Generalized anxiety disorder 2013    Problem resolved at pt request  that it be removed from problem list.      Hoarseness      Menarche age 14    cycles q mo x 7d, heavy, w cramps     Nasal congestion      Neck pain 2011     Oral contraceptive use age 18    for cycle control     Sore throat      Trouble breathing     At night       Past Surgical History:   Procedure Laterality Date      SECTION N/A 2021    Procedure:  SECTION;  Surgeon: Rina Mcdonald MD;  [facility]     CONIZATION LEEP      \"most painful thing I've ever had\"     DILATION AND CURETTAGE SUCTION N/A 2021    Procedure: DILATION AND CURETTAGE, UTERUS, USING SUCTION WITH CONCURRENT FLUOROSCOPY AND UTERINE ARTERY EMBOLIZATION,LEFT;  Surgeon: Lynette Cantrell MD;  [facility] OR     DILATION AND CURETTAGE, OPERATIVE HYSTEROSCOPY WITH MORCELLATOR, COMBINED N/A 2021    Procedure: HYSTEROSCOPY, WITH SUCTION DILATION AND CURETTAGE OF UTERUS USING MORCELLATOR;  Surgeon: Nini Gallagher MD;  [facility]     IR VISCERAL ANGIOGRAM  2021     perianal lesion excision  child    scar on R @ 2:00 -- benign lesion      SURGICAL RADIOLOGY PROCEDURE N/A 2021    Procedure: Percutaneous Access into abnormal Uterine Vessels and with angiogram  ;  Surgeon: Faye Maurice MD;  [facility] OR     [place] teeth extraction  18         Current Outpatient Medications:      acetaminophen (TYLENOL) 325 MG tablet, Take 3 tablets (975 mg) by mouth every 6 hours, Disp: 100 tablet, Rfl: 0     albuterol (PROAIR HFA/PROVENTIL HFA/VENTOLIN HFA) 108 (90 Base) MCG/ACT inhaler, albuterol sulfate HFA 90 mcg/actuation aerosol inhaler, Disp: , Rfl:      docusate sodium (COLACE) 100 MG capsule, Take 1 capsule (100 mg) " by mouth 2 times daily as needed for constipation, Disp: 100 capsule, Rfl: 0     doxylamine (UNISOM) 25 MG TABS tablet, Take 25 mg by mouth At Bedtime, Disp: , Rfl:      FLUoxetine (PROZAC) 10 MG capsule, Take 30 mg by mouth daily., Disp: , Rfl:      gabapentin (NEURONTIN) 300 MG capsule, Take 1 capsule (300 mg) by mouth 3 times daily, Disp: 21 capsule, Rfl: 0     hydrOXYzine (VISTARIL) 25 MG capsule, Take 2-4 capsules ( mg) by mouth nightly as needed (sleep), Disp: 30 capsule, Rfl: 0     ibuprofen (ADVIL/MOTRIN) 600 MG tablet, Take 1 tablet (600 mg) by mouth every 6 hours, Disp: 60 tablet, Rfl: 0     magnesium 250 MG tablet, Take 1 tablet by mouth daily, Disp: , Rfl:      mometasone (ASMANEX TWISTHALER) 220 MCG/INH inhaler, Inhale 1 puff into the lungs every evening , Disp: , Rfl:      multivitamin w/minerals (MULTI-VITAMIN) tablet, Take 1 tablet by mouth daily, Disp: , Rfl:      Omega-3 Fatty Acids (FISH OIL PO), Take by mouth daily, Disp: , Rfl:      oxyCODONE (ROXICODONE) 5 MG tablet, Take 1-2 tablets (5-10 mg) by mouth every 4 hours as needed for severe pain, Disp: 30 tablet, Rfl: 0     polyethylene glycol (MIRALAX) 17 GM/Dose powder, Take 17 g by mouth daily, Disp: 510 g, Rfl: 1     senna-docusate (SENOKOT-S/PERICOLACE) 8.6-50 MG tablet, Take 2 tablets by mouth 2 times daily, Disp: 120 tablet, Rfl: 1     simethicone (MYLICON) 80 MG chewable tablet, Take 1 tablet (80 mg) by mouth 4 times daily as needed for other (gas), Disp: 30 tablet, Rfl: 1    Allergies   Allergen Reactions     Other [No Clinical Screening - See Comments] Anaphylaxis     Triamenic cough syrup.  Per patients father when she was 5 y.o.     Cat Hair Extract Itching       Family History   Problem Relation Age of Onset     Lipids Father      Hypertension Father 72     Breast Cancer Maternal Aunt 50     Ovarian Cancer Mother 58        recurrance Xs 4     Depression Mother 50     Alcohol/Drug Paternal Grandfather      Depression Sister 24      Depression Brother 24     C.A.D. No family hx of      Diabetes No family hx of      Cerebrovascular Disease No family hx of      Cancer - colorectal No family hx of      Prostate Cancer No family hx of      Thyroid Disease No family hx of        Social History     Socioeconomic History     Marital status:      Spouse name: kaushik     Number of children: 1     Years of education: Not on file     Highest education level: Not on file   Occupational History     Not on file   Tobacco Use     Smoking status: Never Smoker     Smokeless tobacco: Never Used   Substance and Sexual Activity     Alcohol use: Yes     Comment: 2-3 drinks 1x/wk max     Drug use: No     Sexual activity: Yes     Partners: Male     Birth control/protection: OCP   Other Topics Concern      Service No     Blood Transfusions No     Caffeine Concern No     Occupational Exposure No     Hobby Hazards No     Sleep Concern Yes     Comment: follows with psychiatry     Stress Concern Yes     Comment: broke off engagement last week     Weight Concern Yes     Comment: 7 # weight loss in 6 weeks     Special Diet Yes     Comment: adding supplement     Back Care No     Exercise Yes     Comment: tries to walk,     Bike Helmet Yes     Seat Belt Yes     Self-Exams No   Social History Narrative    Health Care Maintenance:    Last Pap:2/2011    Vaccinations:flu shot this year, tetanus up to date    TSH:not recent    Fasting glucose:hasn't had    Lipids:hasn't had    Mammogram:n/a    Colonoscopy:n/a    Dexa:n/a        How much exercise per week? Once a week, walk/run, sporting aerobic    How much calcium per day? Glass a milk a day       How much caffeine per day? none    How much vitamin D per day? Don't take it, multivitamin, whatever the sun gives her    Do you/your family wear seatbelts?  Yes    Do you/your family use safety helmets? Yes    Do you/your family use sunscreen? Yes    Do you/your family keep firearms in the home? No    Do you/your family  have a smoke detector(s)? Yes        Do you feel safe in your home? Yes    Has anyone ever touched you in an unwanted manner? No         Jose CRYSTAL LPN 2013                             Social Determinants of Health     Financial Resource Strain:      Difficulty of Paying Living Expenses:    Food Insecurity:      Worried About Running Out of Food in the Last Year:      Ran Out of Food in the Last Year:    Transportation Needs:      Lack of Transportation (Medical):      Lack of Transportation (Non-Medical):    Physical Activity:      Days of Exercise per Week:      Minutes of Exercise per Session:    Stress:      Feeling of Stress :    Social Connections:      Frequency of Communication with Friends and Family:      Frequency of Social Gatherings with Friends and Family:      Attends Synagogue Services:      Active Member of Clubs or Organizations:      Attends Club or Organization Meetings:      Marital Status:    Intimate Partner Violence:      Fear of Current or Ex-Partner:      Emotionally Abused:      Physically Abused:      Sexually Abused:        ROS: 10-Point ROS negative except as noted in HPI    Physical Exam  /72   Pulse 87   Temp 98  F (36.7  C)   Wt 62.6 kg (138 lb)   BMI 20.56 kg/m    Gen: Well-appearing, NAD  HEENT: Normocephalic, atraumatic  Pulm: Breathing comfortably on room air  Ultrasound results reviewed in detail.       Assessment/Plan:  Samantha Dudley is a 40 year old  female here to discuss TVUS findings and next steps regarding focal placenta accreta and ongoing uterine bleeding.     Options reviewed and include:  Expectant management with possible hysteroscopic resection in the future   Methotrexate high dose therapy with leucovorin rescue with possible hysteroscopic resection in the future  Hysterectomy  Consult with [facility] for second opinion    Encouraged to keep MRI and second opinion with [facility]. We will review their findings and suggestions.    Temazepam 15 mg  "ordered per patient request for troubles with sleeping.    Mauricio Fontana MD, MPH  OB/GYN Resident, PGY-1  07/30/21 12:39 PM    I have seen and examined the patient with the resident. I have reviewed, edited, and agree with the note.   I have personally counseled the patient. Patient is reluctant to \"keep trying things and still end up with a hysterectomy.\" We discussed this possibility in this scenario.    Total visit time was 35 minutes with 25 minutes spent in counseling and coordination of care for focal placenta accreta.    Lianne Badillo MD    "

## 2021-07-30 NOTE — TELEPHONE ENCOUNTER
[facility] Call Center    Phone Message    May a detailed message be left on voicemail: yes     Reason for Call: Other: Patient was having more abdominal pain last night, 7/29/21.  Therefore, patient moved ultrasound up from 8/4/21, to 7/30/21.  Patient just wanted to update Dr. Corrales.  Thank you!     Action Taken: Message routed to:  Other: [facility] RN POOL    Travel Screening: Not Applicable

## 2021-08-02 ENCOUNTER — TELEPHONE (OUTPATIENT)
Dept: OBGYN | Facility: CLINIC | Age: 41
End: 2021-08-02

## 2021-08-02 ENCOUNTER — TRANSCRIBE ORDERS (OUTPATIENT)
Dept: OTHER | Age: 41
End: 2021-08-02

## 2021-08-02 DIAGNOSIS — O43.219 PLACENTA ACCRETA: Primary | ICD-10-CM

## 2021-08-02 NOTE — TELEPHONE ENCOUNTER
Called gyn/onc triage today and spoke with nurse, who stated that she would route to schedulers as high priority.  She states that they will call clinic when scheduled

## 2021-08-02 NOTE — TELEPHONE ENCOUNTER
----- Message from Lianne Badillo MD sent at 7/30/2021  8:06 PM CDT -----  Regarding: gyn onc consult  I offered this patient gyn onc consult. We attempted to call onc nurses without success Friday afternoon.     Question: 39 yo  with presumed focal placenta accreta now s/p D and C and UAE with ongoing bleeding. Offered patient expectant mgmt with future hysteroscopic resx, methotrexate with future hsyteroscopic resx, hysterectomy. Patient requests second opinion. (MFM declines any further consults with this patient)    Jazzy [phone]  Michelle [phone]    These are their direct lines  Obviously it's hardik an urgent consult timewise.....    (has MRI at [facility] Monday and virtual with them Weds)    Thanks!!

## 2021-08-03 ENCOUNTER — ONCOLOGY VISIT (OUTPATIENT)
Dept: ONCOLOGY | Facility: CLINIC | Age: 41
End: 2021-08-03
Attending: OBSTETRICS & GYNECOLOGY
Payer: COMMERCIAL

## 2021-08-03 VITALS
HEART RATE: 73 BPM | TEMPERATURE: 98.3 F | DIASTOLIC BLOOD PRESSURE: 66 MMHG | WEIGHT: 138 LBS | OXYGEN SATURATION: 99 % | HEIGHT: 69 IN | SYSTOLIC BLOOD PRESSURE: 102 MMHG | BODY MASS INDEX: 20.44 KG/M2

## 2021-08-03 DIAGNOSIS — M79.18 MUSCULOSKELETAL PAIN: ICD-10-CM

## 2021-08-03 DIAGNOSIS — Z87.59 HISTORY OF PLACENTA ACCRETA: Primary | ICD-10-CM

## 2021-08-03 PROCEDURE — 99215 OFFICE O/P EST HI 40 MIN: CPT | Performed by: OBSTETRICS & GYNECOLOGY

## 2021-08-03 PROCEDURE — G0463 HOSPITAL OUTPT CLINIC VISIT: HCPCS

## 2021-08-03 ASSESSMENT — PAIN SCALES - GENERAL: PAINLEVEL: MILD PAIN (2)

## 2021-08-03 ASSESSMENT — MIFFLIN-ST. JEOR: SCORE: 1357.4

## 2021-08-03 NOTE — PROGRESS NOTES
Consult Notes on Referred Patient            RE: Samantha Dudley  : 1980  ANDREE: Aug 3, 2021    I had the pleasure of seeing your patient Samantha Dudley here at the Gynecologic Cancer Clinic at the [facility] on 8/3/2021.  As you know she is a very pleasant 40 year old woman with a recent diagnosis of AVM/placenta accreta.  Given these findings she was subsequently sent to the Gynecologic Cancer Clinic for new patient consultation.  She is unaccompanied today.    She underwent a repeat  section which was seemingly uncomplicated on 21 of an IVF, donor egg pregnancy.  Unfortunately, 2 weeks later she developed heavy bleeding and presented to the ED and was found to have retained products.  She underwent D&C.  Bleeding improved, until 4 days later when she again developed heavy bleeding and was again seen in the ED. CT was without evidence of intra-abdominal bleeding but did show retained products and intra-uterine bleeding.  She was admitted and underwent UAE in IR of the right uterine artery but unsuccessful UAE on the left due to aberrant anatomy.  She thus had a repeat D&C which again showed further retained products.  Since that discharge she was initially still having heavier, period like bleeding, however over the past 3 days she has noted a significant decrease in her bleeding which she now describes as mostly light brown spotting.  She also feels she may have passed more tissue spontaneously.  She reports continued right upper abdominal pain which is intermittent but not significantly improved from her previous admissions.  She has stopped breast feeding as of a week ago due to challenges with all her admissions, etc.  She is also dealing with a breast yeast infection and still has sharp twinges of pain in her breasts.    21:   section:   Pathology:  Normal placenta but maternal surface is disrupted so completeness cannot be determined    21: Hysteroscopy with  dilation and curettage of uterus using morcellator converted to suction D&C, trigger point injections   Pathology:  Necrotic decidua and necrotic and sclerotic chorionic david    21:  US Pelvis:  The uterus measures 12.3 x 8.2 x 8.4 cm . Within the endometrial cavity along the anterior uterine fundus, there is a hyperechoic region measuring 3.8 x 2.3 cm with significant color and power Doppler flow. Bridging vessels are seen from this tissue extending to the anterior myometrium and there are blood products in the endometrial cavity, compatible with the area of active extravasation seen on same day CTA. Diffusely thickened heterogenous myometrium , compatible with recent gravid state. The endometrial measures 3.1 cm. Trace amount of pelvic free fluid. Ovaries are not visualized    21:  CT A/P:  Postoperative changes of  section. 5.1 x 3.6 cm region of lobulated hyperdensity within the fundal endometrial cavity isodense to intravenous contrast consistent with active hemorrhage. The hemorrhage appears to arise from a vessel  of the anterior fundus. The endometrial cavity is distended to 3.5 cm wide by additional heterogeneously slightly hyperdense blood clot. No extrauterine hemorrhage.    21:  UAE of the right, unable to safely embolize the left uterine artery as it was branching from the left ovarian artery    21:  Dilation and curettage   Pathology:  Implantation site, sclerotic and necrotic chorionic villi    21:  US Pelvis:   Improvement since 21 ultrasound. Abnormal focus of interface at fundus. Hematometra reduced from approximately 4cm to 1.9cm.     21:  MRI Pelvis:  The uterus is retroflexed and measures 8.2 x 4.9 x 5.9 cm. The endometrial cavity is distended measuring 1.3 cm in AP widths. 2.3 x 1.3 x 1.4 cm nonenhancing soft tissue within the endometrial cavity, which is hypointense on T2 and isointense to slightly hyperintense on T1. This is consistent with blood  "clot or non-viable retrained product of concept. Heterogeneous T2 signal areas in the left anterior aspect of junctional zone and in the right lateral aspect along the endometrial cavity show enhancement similar to the remaining uterus. These areas are indeterminate and may represent retained product of concept or changes related to decidualization. Small amount of blood products predominantly in the right posterior body of the uterus. Serpentine non-enhancing areas in the right side of uterus, likely post-embolization changes.  scar. Ovaries are not enlarged. Minimal ascites in the pelvis. Small probably hematoma between the anterior abdominal wall and bladder.      Obstetrics and Gynecology History:  , C/s x 2  Patient feels with all her recent complications as well as her history of premature ovarian failure that she would not attempt another pregnancy  Patient required IVF and donor egg for this pregnancy      Past Medical History:  Past Medical History:   Diagnosis Date     Asthma      Cervical dysplasia 2010    treated in [place], normal f/u paps     Eating disorder     previously on Prozac     Generalized anxiety disorder 2013    Problem resolved at pt request  that it be removed from problem list.        Past Surgical History:  Past Surgical History:   Procedure Laterality Date      SECTION N/A 2021    Procedure:  SECTION;  Surgeon: Rina Mcdonald MD;  [facility]     CONIZATION LEEP      \"most painful thing I've ever had\"     DILATION AND CURETTAGE SUCTION N/A 2021    Procedure: DILATION AND CURETTAGE, UTERUS, USING SUCTION WITH CONCURRENT FLUOROSCOPY AND UTERINE ARTERY EMBOLIZATION,LEFT;  Surgeon: Lynette Cantrell MD;  [facility] OR     DILATION AND CURETTAGE, OPERATIVE HYSTEROSCOPY WITH MORCELLATOR, COMBINED N/A 2021    Procedure: HYSTEROSCOPY, WITH SUCTION DILATION AND CURETTAGE OF UTERUS USING MORCELLATOR;  Surgeon: " "Nini Gallagher MD;  Location[facility] OR     IR VISCERAL ANGIOGRAM  7/19/2021     perianal lesion excision  child    scar on R @ 2:00 -- benign lesion      SURGICAL RADIOLOGY PROCEDURE N/A 7/19/2021    Procedure: Percutaneous Access into abnormal Uterine Vessels and with angiogram  ;  Surgeon: Faye Maurice MD;  Location: [facility] OR     [facility] [place] teeth extraction  17       Health Maintenance:  Last Pap Smear: Several years  She has had a history of abnormal Pap smears-LEEP in 2010 in [place]    Last Mammogram: N/A    Last Colonoscopy: N/A      Family History:   The patient's family history is significant for.  Family History   Problem Relation Age of Onset     Lipids Father      Hypertension Father 72     Breast Cancer Maternal Aunt 50     Ovarian Cancer Mother 58        recurrance Xs 4     Depression Mother 50     Alcohol/Drug Paternal Grandfather      Depression Sister 24     Depression Brother 24     C.A.D. No family hx of      Diabetes No family hx of      Cerebrovascular Disease No family hx of      Cancer - colorectal No family hx of      Prostate Cancer No family hx of      Thyroid Disease No family hx of          Physical Exam:   /66   Pulse 73   Temp 98.3  F (36.8  C)   Ht 1.74 m (5' 8.5\")   Wt 62.6 kg (138 lb)   SpO2 99%   BMI 20.68 kg/m    Body mass index is 20.68 kg/m .    GENERAL: Healthy, alert and no distress  EYES: Eyes grossly normal to inspection.  No discharge or erythema, or obvious scleral/conjunctival abnormalities.  HENT: Normal cephalic/atraumatic.  External ears, nose and mouth without ulcers or lesions.  No nasal drainage visible.  NECK: No asymmetry, visible masses or scars  RESP: No audible wheeze, cough, or visible cyanosis.  No visible retractions or increased work of breathing.    MS: No gross musculoskeletal defects noted.  Normal range of motion.  No visible edema.  SKIN: Visible skin clear. No significant rash, abnormal pigmentation or lesions.  NEURO: " Cranial nerves grossly intact.  Mentation and speech appropriate for age.  PSYCH: Mentation appears normal, affect normal/bright, judgement and insight intact, normal speech and appearance well-groomed.       Assessment:    Samantha Dudley is a 40 year old woman with a new diagnosis of AVM/Placenta accretta.     A total of 60 minutes was spent on patient care today.      Plan:     1.)    AVM/placenta accreta-we reviewed her course thus far in detail including her recent MRI report from [place]  We discussed options of observation, methotrexate, repeat hysteroscopy or hysterectomy.  Given her bleeding has improved significantly in the last 3 days and her MRI is without definitive findings of accreta, I favor observation at this time.  We discussed that while a hysterectomy would be the definitive therapy, with her recent  and UAE, there are likely to be significant adhesions/inflammation which could complicate the procedure.  Given her MRI findings and involuted uterus, I feel a laparoscopic approach would definitely be feasible.  I did review her case with Dr Corrales who had discussed the possibility of a repeat hysteroscopy with her in 4-6 weeks and she agrees that if her bleeding is decreasing that this is not necessary at this time.  At the conclusion of our conversation, Samantha would like to proceed with observation and she was provided with my clinic contact information should her bleeding increase.  She is possibly interested in hysterectomy in the future.    2.) Maternal ovarian cancer-her mother has undergone genetic testing which was negative.  We did discuss even with negative testing, given her mother had ovarian cancer, she is at an elevated risk of ovarian cancer.  We briefly discussed the option for oophorectomy with a hysterectomy in the future for prophylaxis, especially in light of her premature ovarian failure.    3.) Abdominal pain- I believe a large component of this is likely musculoskeletal  and that she would benefit from PT.  Referral was placed today.    4.) Breast, yeast infection-discussed option for gentian violet topical therapy in addition to her diflucan prescribed by her obstetrician        Thank you for allowing us to participate in the care of your patient.         Sincerely,    Joan Chahal MD  Gynecologic Oncology  [facility]       CC  SELF, REFERRED

## 2021-08-03 NOTE — LETTER
8/3/2021         RE: Samantha Dudley  [address]  [place] [facility] [address]        Dear Colleague,    Thank you for referring your patient, Samantha Dudley, to the [facility]. Please see a copy of my visit note below.    Consult Notes on Referred Patient        RE: Samantha Dudley  : 1980  ANDREE: Aug 3, 2021    I had the pleasure of seeing your patient Samantha Dudley here at the Gynecologic Cancer Clinic at the [facility] on 8/3/2021.  As you know she is a very pleasant 40 year old woman with a recent diagnosis of AVM/placenta accreta.  Given these findings she was subsequently sent to the Gynecologic Cancer Clinic for new patient consultation.  She is unaccompanied today.    She underwent a repeat  section which was seemingly uncomplicated on 21 of an IVF, donor egg pregnancy.  Unfortunately, 2 weeks later she developed heavy bleeding and presented to the ED and was found to have retained products.  She underwent D&C.  Bleeding improved, until 4 days later when she again developed heavy bleeding and was again seen in the ED. CT was without evidence of intra-abdominal bleeding but did show retained products and intra-uterine bleeding.  She was admitted and underwent UAE in IR of the right uterine artery but unsuccessful UAE on the left due to aberrant anatomy.  She thus had a repeat D&C which again showed further retained products.  Since that discharge she was initially still having heavier, period like bleeding, however over the past 3 days she has noted a significant decrease in her bleeding which she now describes as mostly light brown spotting.  She also feels she may have passed more tissue spontaneously.  She reports continued right upper abdominal pain which is intermittent but not significantly improved from her previous admissions.  She has stopped breast feeding as of a week ago due to challenges with all her admissions, etc.  She is also  dealing with a breast yeast infection and still has sharp twinges of pain in her breasts.    21:   section:   Pathology:  Normal placenta but maternal surface is disrupted so completeness cannot be determined    21: Hysteroscopy with dilation and curettage of uterus using morcellator converted to suction D&C, trigger point injections   Pathology:  Necrotic decidua and necrotic and sclerotic chorionic david    21:  US Pelvis:  The uterus measures 12.3 x 8.2 x 8.4 cm . Within the endometrial cavity along the anterior uterine fundus, there is a hyperechoic region measuring 3.8 x 2.3 cm with significant color and power Doppler flow. Bridging vessels are seen from this tissue extending to the anterior myometrium and there are blood products in the endometrial cavity, compatible with the area of active extravasation seen on same day CTA. Diffusely thickened heterogenous myometrium , compatible with recent gravid state. The endometrial measures 3.1 cm. Trace amount of pelvic free fluid. Ovaries are not visualized    21:  CT A/P:  Postoperative changes of  section. 5.1 x 3.6 cm region of lobulated hyperdensity within the fundal endometrial cavity isodense to intravenous contrast consistent with active hemorrhage. The hemorrhage appears to arise from a vessel  of the anterior fundus. The endometrial cavity is distended to 3.5 cm wide by additional heterogeneously slightly hyperdense blood clot. No extrauterine hemorrhage.    21:  UAE of the right, unable to safely embolize the left uterine artery as it was branching from the left ovarian artery    21:  Dilation and curettage   Pathology:  Implantation site, sclerotic and necrotic chorionic villi    21:  US Pelvis:   Improvement since 21 ultrasound. Abnormal focus of interface at fundus. Hematometra reduced from approximately 4cm to 1.9cm.     21:  MRI Pelvis:  The uterus is retroflexed and measures 8.2 x 4.9 x 5.9  "cm. The endometrial cavity is distended measuring 1.3 cm in AP widths. 2.3 x 1.3 x 1.4 cm nonenhancing soft tissue within the endometrial cavity, which is hypointense on T2 and isointense to slightly hyperintense on T1. This is consistent with blood clot or non-viable retrained product of concept. Heterogeneous T2 signal areas in the left anterior aspect of junctional zone and in the right lateral aspect along the endometrial cavity show enhancement similar to the remaining uterus. These areas are indeterminate and may represent retained product of concept or changes related to decidualization. Small amount of blood products predominantly in the right posterior body of the uterus. Serpentine non-enhancing areas in the right side of uterus, likely post-embolization changes.  scar. Ovaries are not enlarged. Minimal ascites in the pelvis. Small probably hematoma between the anterior abdominal wall and bladder.      Obstetrics and Gynecology History:  , C/s x 2  Patient feels with all her recent complications as well as her history of premature ovarian failure that she would not attempt another pregnancy  Patient required IVF and donor egg for this pregnancy      Past Medical History:  Past Medical History:   Diagnosis Date     Asthma      Cervical dysplasia 2010    treated in [place], normal f/u paps     Eating disorder     previously on Prozac     Generalized anxiety disorder 2013    Problem resolved at pt request  that it be removed from problem list.        Past Surgical History:  Past Surgical History:   Procedure Laterality Date      SECTION N/A 2021    Procedure:  SECTION;  Surgeon: Rina Mcdonald MD;  [facility]     CONIZATION LEE      \"most painful thing I've ever had\"     DILATION AND CURETTAGE SUCTION N/A 2021    Procedure: DILATION AND CURETTAGE, UTERUS, USING SUCTION WITH CONCURRENT FLUOROSCOPY AND UTERINE ARTERY " "EMBOLIZATION,LEFT;  Surgeon: Lynette Cantrell MD;  [facility]     DILATION AND CURETTAGE, OPERATIVE HYSTEROSCOPY WITH MORCELLATOR, COMBINED N/A 7/14/2021    Procedure: HYSTEROSCOPY, WITH SUCTION DILATION AND CURETTAGE OF UTERUS USING MORCELLATOR;  Surgeon: Nini Gallagher MD;  [facility]     IR VISCERAL ANGIOGRAM  7/19/2021     perianal lesion excision  child    scar on R @ 2:00 -- benign lesion      SURGICAL RADIOLOGY PROCEDURE N/A 7/19/2021    Procedure: Percutaneous Access into abnormal Uterine Vessels and with angiogram  ;  Surgeon: Faye Maurice MD;  [facility] OR     [place] teeth extraction  17       Health Maintenance:  Last Pap Smear: Several years  She has had a history of abnormal Pap smears-LEEP in 2010 in [place]    Last Mammogram: N/A    Last Colonoscopy: N/A      Family History:   The patient's family history is significant for.  Family History   Problem Relation Age of Onset     Lipids Father      Hypertension Father 72     Breast Cancer Maternal Aunt 50     Ovarian Cancer Mother 58        recurrance Xs 4     Depression Mother 50     Alcohol/Drug Paternal Grandfather      Depression Sister 24     Depression Brother 24     C.A.D. No family hx of      Diabetes No family hx of      Cerebrovascular Disease No family hx of      Cancer - colorectal No family hx of      Prostate Cancer No family hx of      Thyroid Disease No family hx of          Physical Exam:   /66   Pulse 73   Temp 98.3  F (36.8  C)   Ht 1.74 m (5' 8.5\")   Wt 62.6 kg (138 lb)   SpO2 99%   BMI 20.68 kg/m    Body mass index is 20.68 kg/m .    GENERAL: Healthy, alert and no distress  EYES: Eyes grossly normal to inspection.  No discharge or erythema, or obvious scleral/conjunctival abnormalities.  HENT: Normal cephalic/atraumatic.  External ears, nose and mouth without ulcers or lesions.  No nasal drainage visible.  NECK: No asymmetry, visible masses or scars  RESP: No audible wheeze, cough, " or visible cyanosis.  No visible retractions or increased work of breathing.    MS: No gross musculoskeletal defects noted.  Normal range of motion.  No visible edema.  SKIN: Visible skin clear. No significant rash, abnormal pigmentation or lesions.  NEURO: Cranial nerves grossly intact.  Mentation and speech appropriate for age.  PSYCH: Mentation appears normal, affect normal/bright, judgement and insight intact, normal speech and appearance well-groomed.       Assessment:    Samantha Dudley is a 40 year old woman with a new diagnosis of AVM/Placenta accretta.     A total of 60 minutes was spent on patient care today.      Plan:     1.)    AVM/placenta accreta-we reviewed her course thus far in detail including her recent MRI report from [place]  We discussed options of observation, methotrexate, repeat hysteroscopy or hysterectomy.  Given her bleeding has improved significantly in the last 3 days and her MRI is without definitive findings of accreta, I favor observation at this time.  We discussed that while a hysterectomy would be the definitive therapy, with her recent  and UAE, there are likely to be significant adhesions/inflammation which could complicate the procedure.  Given her MRI findings and involuted uterus, I feel a laparoscopic approach would definitely be feasible.  I did review her case with Dr Corrales who had discussed the possibility of a repeat hysteroscopy with her in 4-6 weeks and she agrees that if her bleeding is decreasing that this is not necessary at this time.  At the conclusion of our conversation, Samantha would like to proceed with observation and she was provided with my clinic contact information should her bleeding increase.  She is possibly interested in hysterectomy in the future.    2.) Maternal ovarian cancer-her mother has undergone genetic testing which was negative.  We did discuss even with negative testing, given her mother had ovarian cancer, she is at an elevated risk of  ovarian cancer.  We briefly discussed the option for oophorectomy with a hysterectomy in the future for prophylaxis, especially in light of her premature ovarian failure.    3.) Abdominal pain- I believe a large component of this is likely musculoskeletal and that she would benefit from PT.  Referral was placed today.    4.) Breast, yeast infection-discussed option for gentian violet topical therapy in addition to her diflucan prescribed by her obstetrician        Thank you for allowing us to participate in the care of your patient.         Sincerely,    Joan Chahal MD  Gynecologic Oncology  [facility]       CC  SELF, REFERRED

## 2021-08-03 NOTE — NURSING NOTE
"Oncology Rooming Note    August 3, 2021 3:17 PM   Samantha Dudley is a 40 year old female who presents for:    Chief Complaint   Patient presents with     Oncology Clinic Visit     Placenta accreta      Initial Vitals: /66   Pulse 73   Temp 98.3  F (36.8  C)   Ht 1.74 m (5' 8.5\")   Wt 62.6 kg (138 lb)   SpO2 99%   BMI 20.68 kg/m   Estimated body mass index is 20.68 kg/m  as calculated from the following:    Height as of this encounter: 1.74 m (5' 8.5\").    Weight as of this encounter: 62.6 kg (138 lb). Body surface area is 1.74 meters squared.  Mild Pain (2) Comment: Data Unavailable   No LMP recorded. (Menstrual status: Postpartum).  Allergies reviewed: Yes  Medications reviewed: Yes    Medications: Medication refills not needed today.  Pharmacy name entered into [facility]:    [id number] - [place], [place] - [address] AT [facility] [address]  [id number] - [place], [place] - [address] AT [facility] [address] [facility] [address]  [id number] - [place], [place] - [address]    Clinical concerns: New patient question having breast pain bilateral       Amadeo Benoit MA            "

## 2021-08-16 ENCOUNTER — ANCILLARY PROCEDURE (OUTPATIENT)
Dept: ULTRASOUND IMAGING | Facility: CLINIC | Age: 41
End: 2021-08-16
Attending: OBSTETRICS & GYNECOLOGY
Payer: COMMERCIAL

## 2021-08-16 PROCEDURE — 76830 TRANSVAGINAL US NON-OB: CPT | Mod: GC | Performed by: RADIOLOGY

## 2021-08-16 PROCEDURE — 76856 US EXAM PELVIC COMPLETE: CPT | Mod: GC | Performed by: RADIOLOGY

## 2021-08-19 ENCOUNTER — PATIENT OUTREACH (OUTPATIENT)
Dept: ONCOLOGY | Facility: CLINIC | Age: 41
End: 2021-08-19

## 2021-08-23 ENCOUNTER — THERAPY VISIT (OUTPATIENT)
Dept: PHYSICAL THERAPY | Facility: CLINIC | Age: 41
End: 2021-08-23
Attending: OBSTETRICS & GYNECOLOGY
Payer: COMMERCIAL

## 2021-08-23 DIAGNOSIS — M79.18 MUSCULOSKELETAL PAIN: ICD-10-CM

## 2021-08-23 DIAGNOSIS — M54.50 ACUTE BILATERAL LOW BACK PAIN WITHOUT SCIATICA: ICD-10-CM

## 2021-08-23 DIAGNOSIS — M54.2 NECK PAIN: ICD-10-CM

## 2021-08-23 PROCEDURE — 97110 THERAPEUTIC EXERCISES: CPT | Mod: GP | Performed by: PHYSICAL THERAPIST

## 2021-08-23 PROCEDURE — 97161 PT EVAL LOW COMPLEX 20 MIN: CPT | Mod: GP | Performed by: PHYSICAL THERAPIST

## 2021-08-23 PROCEDURE — 97112 NEUROMUSCULAR REEDUCATION: CPT | Mod: GP | Performed by: PHYSICAL THERAPIST

## 2021-08-23 NOTE — PROGRESS NOTES
Physical Therapy Initial Evaluation  Subjective:    Patient Health History  Samantha Dudley being seen for Low back and neck pain.     Problem began: 8/3/2021 (MD appointment).   Problem occurred:  and additional surgeries   Pain is reported as 4/10 on pain scale.  General health as reported by patient is fair.  Pertinent medical history includes: asthma and sleep disorder/apnea.   Red flags:  None as reported by patient.  Medical allergies: none.   Surgeries include:  Other. Other surgery history details: .    Current medications:  Anti-depressants and sleep medication.    Current occupation is .   Primary job tasks include:  Lifting/carrying and repetitive tasks.                  Therapist Generated HPI Evaluation  Problem details: Pt presents to clinic with complaints of low back and neck pain. Pt had  on 2021, with 3 additional surgeries for complications. Pt having increased pain with prolonged sitting, lifting/carrying, and twisting. Pt had MD appointment on 8/3/2021 and referred to PT. .         Type of problem:  Lumbar.    This is a new condition.  Condition occurred with:  Insidious onset.  Where condition occurred: for unknown reasons.  Patient reports pain:  Lower lumbar spine, mid lumbar spine, lumbar spine left and lumbar spine right.  Pain is described as aching and shooting and is intermittent.  Pain radiates to:  Gluteals right and gluteals left. Pain is worse in the P.M..  Since onset symptoms are unchanged.  Associated symptoms:  Loss of motion/stiffness and loss of strength. Symptoms are exacerbated by carrying, lifting, certain positions and sitting  and relieved by NSAID's and activity/movement.  Imaging testing: none.  Past treatment: none. There was none improvement following previous treatment.  Restrictions due to condition include:  Working in normal job without restrictions.  Barriers include:  None as reported by patient.                         Objective:  Standing Alignment:    Cervical/Thoracic:  Forward head  Shoulder/UE:  Rounded shoulders                             Lumbar/SI Evaluation  ROM:    AROM Lumbar:   Flexion:            To floor -pain  Ext:                    75% slight pain R   Side Bend:        Left:  75%    Right:  75%  Rotation:           Left:  WNL    Right:  WNL  Side Glide:        Left:     Right:           Lumbar Myotomes:  normal                  Neural Tension/Mobility:      Left side:SLR; Femoral Nerve or Slump  negative.     Right side:   Femoral Nerve; Slump or SLR  negative.   Lumbar Palpation:    Tenderness present at Left:    Erector Spinae  Tenderness present at Right: Erector Spinae             Cervical/Thoracic Evaluation    AROM:  AROM Cervical:    Flexion:          WNL +pulling R  Extension:       75%  Rotation:         Left: 40 deg     Right: 50 deg  Side Bend:      Left:     Right:       Headaches: none  Cervical Myotomes:  normal                        Cervical Palpation:    Tenderness present at Left:    Upper Trap and Erector Spinae  Tenderness present at Right:    Upper Trap and Erector Spinae                                                  General     ROS    Assessment/Plan:    Patient is a 41 year old female with cervical and lumbar complaints.    Patient has the following significant findings with corresponding treatment plan.                Diagnosis 1:  Low back and neck pain  Pain -  hot/cold therapy, manual therapy, self management, education and home program  Decreased ROM/flexibility - manual therapy, therapeutic exercise, therapeutic activity and home program  Decreased strength - therapeutic exercise, therapeutic activities and home program  Impaired muscle performance - neuro re-education and home program  Decreased function - therapeutic activities and home program  Impaired posture - neuro re-education, therapeutic activities and home program    Therapy Evaluation Codes:   1) History comprised  of:   Personal factors that impact the plan of care:      None.    Comorbidity factors that impact the plan of care are:      Asthma.     Medications impacting care: Anti-depressant and Sleep.  2) Examination of Body Systems comprised of:   Body structures and functions that impact the plan of care:      Cervical spine and Lumbar spine.   Activity limitations that impact the plan of care are:      Bending, Lifting, Reading/Computer work, Sitting and Standing.  3) Clinical presentation characteristics are:   Stable/Uncomplicated.  4) Decision-Making    Low complexity using standardized patient assessment instrument and/or measureable assessment of functional outcome.  Cumulative Therapy Evaluation is: Low complexity.    Previous and current functional limitations:  (See Goal Flow Sheet for this information)    Short term and Long term goals: (See Goal Flow Sheet for this information)     Communication ability:  Patient appears to be able to clearly communicate and understand verbal and written communication and follow directions correctly.  Treatment Explanation - The following has been discussed with the patient:   RX ordered/plan of care  Anticipated outcomes  Possible risks and side effects  This patient would benefit from PT intervention to resume normal activities.   Rehab potential is good.    Frequency:  1 X week, once daily  Duration:  for 8 weeks  Discharge Plan:  Achieve all LTG.  Independent in home treatment program.  Return to previous functional level by discharge.  Reach maximal therapeutic benefit.    Please refer to the daily flowsheet for treatment today, total treatment time and time spent performing 1:1 timed codes.

## 2021-08-26 NOTE — PROGRESS NOTES
Pt left message on voice mail  Ultrasound done on Monday  Saw results in my chart  Has questions

## 2021-08-26 NOTE — PROGRESS NOTES
Spoke with pt  Has questions on ultrasound, recently passed tissue, was seen at [facility] who suggested ultrsound in 2 weeks, wondering if can schedule this here  Wonders what improvement was seen and what exactly it means  Would like md philip

## 2021-08-26 NOTE — PROGRESS NOTES
msg sent to md reminding of need to call pt    Spoke with pt  Explained MD is out on vacation this week so I cannot guarantee when MD will be calling you  MD did say she would try to reach you this week and will be back on  Monday.    Pt wonders if ultrasound that was ordered per [place] could be scheduled explained to pt would need an order from our doctor here.  Explained that it is very easy to get this type of appt within a few days so no worries    Pt does state Md attempted to call pt yesterday but they did not connect  Pt also wants to let us know about right sided pain she is having occasionally, it comes and goes, not related to anything at this time.  Wonders if she is constipated, encouraged pt to push fluids and increase fiber in diet if this the case

## 2021-08-30 ENCOUNTER — THERAPY VISIT (OUTPATIENT)
Dept: PHYSICAL THERAPY | Facility: CLINIC | Age: 41
End: 2021-08-30
Payer: COMMERCIAL

## 2021-08-30 ENCOUNTER — NURSE TRIAGE (OUTPATIENT)
Dept: NURSING | Facility: CLINIC | Age: 41
End: 2021-08-30

## 2021-08-30 DIAGNOSIS — M54.50 ACUTE BILATERAL LOW BACK PAIN WITHOUT SCIATICA: ICD-10-CM

## 2021-08-30 DIAGNOSIS — M54.2 NECK PAIN: ICD-10-CM

## 2021-08-30 PROCEDURE — 97112 NEUROMUSCULAR REEDUCATION: CPT | Mod: GP | Performed by: PHYSICAL THERAPIST

## 2021-08-30 PROCEDURE — 97110 THERAPEUTIC EXERCISES: CPT | Mod: GP | Performed by: PHYSICAL THERAPIST

## 2021-08-30 NOTE — TELEPHONE ENCOUNTER
Patient calling she just missed Dr. Joan Ballard's phone call.  There is no note in the chart to pass onto the patient.  Patient requesting write try to page provider for her.  Writer pages provider with help of page  at 4:37 pm.    Kaylah Hughes RN, [facility] Triage Nurse Advisor         Reason for Disposition    General information question, no triage required and triager able to answer question    Protocols used: INFORMATION ONLY CALL - NO TRIAGE-A-

## 2021-08-31 DIAGNOSIS — Z87.59 HISTORY OF PLACENTA ACCRETA: Primary | ICD-10-CM

## 2021-09-07 ENCOUNTER — THERAPY VISIT (OUTPATIENT)
Dept: PHYSICAL THERAPY | Facility: CLINIC | Age: 41
End: 2021-09-07
Payer: COMMERCIAL

## 2021-09-07 DIAGNOSIS — M54.50 ACUTE BILATERAL LOW BACK PAIN WITHOUT SCIATICA: ICD-10-CM

## 2021-09-07 DIAGNOSIS — M54.2 NECK PAIN: ICD-10-CM

## 2021-09-07 PROCEDURE — 97112 NEUROMUSCULAR REEDUCATION: CPT | Mod: GP | Performed by: PHYSICAL THERAPIST

## 2021-09-07 PROCEDURE — 97110 THERAPEUTIC EXERCISES: CPT | Mod: GP | Performed by: PHYSICAL THERAPIST

## 2021-09-10 NOTE — PROGRESS NOTES
9/7  438 pm pt returned call left message on voice mail waiting to schedule pelvic ultrasound    9/8  154  Waiting to schedule pelvic ultrasound   Monday pm is best in pm  Leave message with details

## 2021-09-13 ENCOUNTER — ANCILLARY PROCEDURE (OUTPATIENT)
Dept: ULTRASOUND IMAGING | Facility: CLINIC | Age: 41
End: 2021-09-13
Attending: OBSTETRICS & GYNECOLOGY
Payer: COMMERCIAL

## 2021-09-13 ENCOUNTER — TELEPHONE (OUTPATIENT)
Dept: ONCOLOGY | Facility: CLINIC | Age: 41
End: 2021-09-13

## 2021-09-13 PROCEDURE — 76856 US EXAM PELVIC COMPLETE: CPT | Mod: GC | Performed by: RADIOLOGY

## 2021-09-13 PROCEDURE — 76830 TRANSVAGINAL US NON-OB: CPT | Mod: GC | Performed by: RADIOLOGY
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
## 2021-09-18 ENCOUNTER — HEALTH MAINTENANCE LETTER (OUTPATIENT)
Age: 41
End: 2021-09-18

## 2021-09-22 ENCOUNTER — APPOINTMENT (OUTPATIENT)
Dept: URBAN - METROPOLITAN AREA CLINIC 259 | Age: 41
Setting detail: DERMATOLOGY
End: 2021-09-23

## 2021-09-22 DIAGNOSIS — D22 MELANOCYTIC NEVI: ICD-10-CM

## 2021-09-22 DIAGNOSIS — Z71.89 OTHER SPECIFIED COUNSELING: ICD-10-CM

## 2021-09-22 DIAGNOSIS — L81.4 OTHER MELANIN HYPERPIGMENTATION: ICD-10-CM

## 2021-09-22 DIAGNOSIS — L91.8 OTHER HYPERTROPHIC DISORDERS OF THE SKIN: ICD-10-CM

## 2021-09-22 DIAGNOSIS — L57.8 OTHER SKIN CHANGES DUE TO CHRONIC EXPOSURE TO NONIONIZING RADIATION: ICD-10-CM

## 2021-09-22 DIAGNOSIS — D18.0 HEMANGIOMA: ICD-10-CM

## 2021-09-22 PROBLEM — D18.01 HEMANGIOMA OF SKIN AND SUBCUTANEOUS TISSUE: Status: ACTIVE | Noted: 2021-09-22

## 2021-09-22 PROBLEM — D48.5 NEOPLASM OF UNCERTAIN BEHAVIOR OF SKIN: Status: ACTIVE | Noted: 2021-09-22

## 2021-09-22 PROBLEM — D22.5 MELANOCYTIC NEVI OF TRUNK: Status: ACTIVE | Noted: 2021-09-22

## 2021-09-22 PROCEDURE — OTHER SKIN TAG REMOVAL MULTI: OTHER

## 2021-09-22 PROCEDURE — 11200 RMVL SKIN TAGS UP TO&INC 15: CPT

## 2021-09-22 PROCEDURE — OTHER SHAVE REMOVAL: OTHER

## 2021-09-22 PROCEDURE — 11301 SHAVE SKIN LESION 0.6-1.0 CM: CPT | Mod: 59

## 2021-09-22 PROCEDURE — 99203 OFFICE O/P NEW LOW 30 MIN: CPT | Mod: 25

## 2021-09-22 PROCEDURE — OTHER MIPS QUALITY: OTHER

## 2021-09-22 PROCEDURE — OTHER COUNSELING: OTHER

## 2021-09-22 ASSESSMENT — LOCATION SIMPLE DESCRIPTION DERM
LOCATION SIMPLE: UPPER BACK
LOCATION SIMPLE: LEFT SHOULDER
LOCATION SIMPLE: LEFT THIGH
LOCATION SIMPLE: LEFT UPPER BACK

## 2021-09-22 ASSESSMENT — LOCATION DETAILED DESCRIPTION DERM
LOCATION DETAILED: LEFT INFERIOR MEDIAL UPPER BACK
LOCATION DETAILED: LEFT LATERAL UPPER BACK
LOCATION DETAILED: LEFT ANTERIOR PROXIMAL THIGH
LOCATION DETAILED: INFERIOR THORACIC SPINE
LOCATION DETAILED: LEFT MEDIAL UPPER BACK
LOCATION DETAILED: LEFT ANTERIOR SHOULDER

## 2021-09-22 ASSESSMENT — LOCATION ZONE DERM
LOCATION ZONE: LEG
LOCATION ZONE: TRUNK
LOCATION ZONE: ARM

## 2021-09-22 NOTE — PROCEDURE: COUNSELING
Detail Level: Detailed
Detail Level: Generalized
Patient Specific Counseling (Will Not Stick From Patient To Patient): Discussed options for treatment of dark spots. Recommended she schedule a consult with the  to discuss skin care and chemical peels. She can call the clinic to request a rx for hydroquinone.
Detail Level: Simple

## 2021-09-22 NOTE — PROCEDURE: SHAVE REMOVAL
Size Of Lesion In Cm (Required): 0.6
Consent was obtained from the patient. The risks and benefits to therapy were discussed in detail. Specifically, the risks of infection, scarring, bleeding, prolonged wound healing, incomplete removal, allergy to anesthesia, nerve injury and recurrence were addressed. Prior to the procedure, the treatment site was clearly identified and confirmed by the patient. All components of Universal Protocol/PAUSE Rule completed.
Notification Instructions: Patient will be notified of pathology results. However, patient instructed to call the office if not contacted within 2 weeks.
Bill 27917 For Specimen Handling/Conveyance To Laboratory?: no
Wound Care: Petrolatum
Post-Care Instructions: I reviewed with the patient in detail post-care instructions. Patient is to keep the biopsy site dry overnight, and then apply bacitracin twice daily until healed. Patient may apply hydrogen peroxide soaks to remove any crusting.
Biopsy Method: Dermablade
Medical Necessity Information: It is in your best interest to select a reason for this procedure from the list below. All of these items fulfill various CMS LCD requirements except the new and changing color options.
Was A Bandage Applied: Yes
Detail Level: Detailed
Medical Necessity Clause: This procedure was medically necessary because the lesion that was treated was:
X Size Of Lesion In Cm (Optional): 0
Hemostasis: Drysol
Billing Type: Third-Party Bill
Anesthesia Type: 1% lidocaine with epinephrine

## 2021-09-22 NOTE — PROCEDURE: SKIN TAG REMOVAL MULTI
Detail Level: Detailed
Add Associated Diagnoses If Applicable When Selecting Medical Necessity: Yes
Medical Necessity Clause: This procedure was medically necessary because the lesions that were treated were:
Medical Necessity Information: It is in your best interest to select a reason for this procedure from the list below. All of these items fulfill various CMS LCD requirements except the new and changing color options.
Include Z78.9 (Other Specified Conditions Influencing Health Status) As An Associated Diagnosis?: No
Consent obtained and the risks of skin tag removal was reviewed with the patient including but not limited to bleeding, pigmentary change, infection, pain, and remote possibility of scarring.
Total Number Of Lesions Treated: 2
Anesthesia Volume In Cc: 3

## 2021-09-22 NOTE — PROCEDURE: MIPS QUALITY
Quality 130: Documentation Of Current Medications In The Medical Record: Current Medications Documented
Quality 431: Preventive Care And Screening: Unhealthy Alcohol Use - Screening: Patient not identified as an unhealthy alcohol user when screened for unhealthy alcohol use using a systematic screening method
Detail Level: Generalized
Quality 110: Preventive Care And Screening: Influenza Immunization: Influenza Immunization Ordered or Recommended, but not Administered due to system reason
Quality 226: Preventive Care And Screening: Tobacco Use: Screening And Cessation Intervention: Patient screened for tobacco use and is an ex/non-smoker

## 2021-09-22 NOTE — HPI: FULL BODY SKIN EXAMINATION
What Type Of Note Output Would You Prefer (Optional)?: Standard Output
What Is The Reason For Today's Visit?: Full Body Skin Examination
What Is The Reason For Today's Visit? (Being Monitored For X): concerning skin lesions on an annual basis
Additional History: Patient has a couple of spots she would like removed.

## 2021-10-01 ENCOUNTER — APPOINTMENT (OUTPATIENT)
Dept: URBAN - METROPOLITAN AREA CLINIC 259 | Age: 41
Setting detail: DERMATOLOGY
End: 2021-10-01

## 2021-10-01 DIAGNOSIS — R20.8 OTHER DISTURBANCES OF SKIN SENSATION: ICD-10-CM

## 2021-10-01 DIAGNOSIS — S0182XA OPEN WOUND OF OTHER AND MULTIPLE SITES OF FACE, COMPLICATED: ICD-10-CM

## 2021-10-01 DIAGNOSIS — L91.8 OTHER HYPERTROPHIC DISORDERS OF THE SKIN: ICD-10-CM

## 2021-10-01 PROBLEM — T07.XXXA UNSPECIFIED MULTIPLE INJURIES, INITIAL ENCOUNTER: Status: ACTIVE | Noted: 2021-10-01

## 2021-10-01 PROCEDURE — 97597 DBRDMT OPN WND 1ST 20 CM/<: CPT

## 2021-10-01 PROCEDURE — OTHER DEBRIDEMENT OF OPEN WOUND: OTHER

## 2021-10-01 PROCEDURE — 99213 OFFICE O/P EST LOW 20 MIN: CPT | Mod: 24,25

## 2021-10-01 PROCEDURE — OTHER OTHER: OTHER

## 2021-10-01 PROCEDURE — OTHER COUNSELING: OTHER

## 2021-10-01 PROCEDURE — OTHER PRESCRIPTION: OTHER

## 2021-10-01 PROCEDURE — OTHER MEDICATION COUNSELING: OTHER

## 2021-10-01 RX ORDER — MUPIROCIN 20 MG/G
OINTMENT TOPICAL BID
Qty: 22 | Refills: 1 | Status: ERX | COMMUNITY
Start: 2021-10-01

## 2021-10-01 ASSESSMENT — LOCATION ZONE DERM
LOCATION ZONE: NECK
LOCATION ZONE: AXILLAE
LOCATION ZONE: TRUNK

## 2021-10-01 ASSESSMENT — LOCATION DETAILED DESCRIPTION DERM
LOCATION DETAILED: LEFT LATERAL UPPER BACK
LOCATION DETAILED: LEFT AXILLARY TAIL OF BREAST
LOCATION DETAILED: LEFT INFERIOR LATERAL NECK
LOCATION DETAILED: LEFT POSTERIOR AXILLA

## 2021-10-01 ASSESSMENT — LOCATION SIMPLE DESCRIPTION DERM
LOCATION SIMPLE: LEFT UPPER BACK
LOCATION SIMPLE: LEFT POSTERIOR AXILLA
LOCATION SIMPLE: LEFT ANTERIOR NECK
LOCATION SIMPLE: LEFT BREAST

## 2021-10-01 ASSESSMENT — PAIN INTENSITY VAS: HOW INTENSE IS YOUR PAIN 0 BEING NO PAIN, 10 BEING THE MOST SEVERE PAIN POSSIBLE?: 2/10 PAIN

## 2021-10-01 NOTE — PROCEDURE: MEDICATION COUNSELING
Xelannelisez Pregnancy And Lactation Text: This medication is Pregnancy Category D and is not considered safe during pregnancy.  The risk during breast feeding is also uncertain.

## 2021-10-01 NOTE — PROCEDURE: COUNSELING
Detail Level: Detailed
Detail Level: Simple
Patient Specific Counseling (Will Not Stick From Patient To Patient): Discussed with patient washing area daily with soap and water and cleaning with hydrogen peroxide once to twice daily and then applying Mupurocin ointment twice daily for two weeks

## 2021-10-01 NOTE — PROCEDURE: MEDICATION COUNSELING
PROVIDER:[TOKEN:[[id number]]] Niacinamide Pregnancy And Lactation Text: These medications are considered safe during pregnancy.

## 2021-10-01 NOTE — PROCEDURE: OTHER
Other (Free Text): Discussed that after freezing its possible they may not be completely treated. Advised to follow up in 2 weeks and possibly treat them again
Render Risk Assessment In Note?: no
Detail Level: Zone
Note Text (......Xxx Chief Complaint.): This diagnosis correlates with the

## 2021-10-01 NOTE — PROCEDURE: DEBRIDEMENT OF OPEN WOUND
Anesthesia Type: 1% lidocaine with epinephrine
Consent was obtained from the patient. The risks, benefits and alternatives to therapy were discussed in detail. Specifically, the risks of infection, scarring, bleeding, prolonged wound healing, nerve injury, and allergy to anesthesia were addressed. Alternatives to debridement, such as aggressive wound care, were also discussed.  Prior to the procedure, the treatment site was clearly identified and confirmed by the patient. All components of Universal Protocol/PAUSE Rule completed.
Post-Care Instructions: I reviewed with the patient in detail post-care instructions. Patient is to keep the area dry for 48 hours, and not to engage in any swimming until the area is healed. Should the patient develop any fevers, chills, bleeding, severe pain patient will contact the office immediately.
Anesthesia Volume In Cc (Will Not Render If 0): 0.5
Size Of Wound In Cm2 (Optional): 0
Procedure: Debridement of wound tissue less than 20sq cm
Detail Level: Detailed
Render Post-Care Instructions In Note?: yes

## 2021-10-21 PROBLEM — M54.2 NECK PAIN: Status: RESOLVED | Noted: 2021-08-23 | Resolved: 2021-10-21

## 2021-10-21 PROBLEM — M54.50 ACUTE BILATERAL LOW BACK PAIN WITHOUT SCIATICA: Status: RESOLVED | Noted: 2021-08-23 | Resolved: 2021-10-21

## 2021-10-21 NOTE — PROGRESS NOTES
Discharge Note    Progress reporting period is from initial evaluation date (please see noted date below) to Sep 7, 2021.  Linked Episodes   Type: Episode: Status: Noted: Resolved: Last update: Updated by:   PHYSICAL THERAPY Low back, neck 8/23/2021 Active 8/23/2021 9/7/2021 11:04 AM Darrick Aguilar, PT      Comments:       Samantha failed to follow up and current status is unknown.  Please see information below for last relevant information on current status.  Patient seen for 3 visits.    SUBJECTIVE  Subjective changes noted by patient:  Samantha reports slight increase in soreness over the weekend related to sleeping on son's bed. Has been consistent with HEP daily.   .  Current pain level is 6/10.     Previous pain level was  5/10.   Changes in function:  Yes (See Goal flowsheet attached for changes in current functional level)  Adverse reaction to treatment or activity: None    OBJECTIVE  Changes noted in objective findings: Cueing to maintain neutral pelvis with bridge 2A      ASSESSMENT/PLAN  Diagnosis: Low back and neck pain   Updated problem list and treatment plan:   Pain - HEP  Decreased ROM/flexibility - HEP  Decreased strength - HEP  Impaired muscle performance - HEP  Impaired posture - HEP  STG/LTGs have been met or progress has been made towards goals:  Yes, please see goal flowsheet for most current information  Assessment of Progress: current status is unknown.    Last current status: Pt is progressing as expected   Self Management Plans:  HEP  I have re-evaluated this patient and find that the nature, scope, duration and intensity of the therapy is appropriate for the medical condition of the patient.  Samantha continues to require the following intervention to meet STG and LTG's:  HEP.    Recommendations:  Discharge with current home program.  Patient to follow up with MD as needed.    Please refer to the daily flowsheet for treatment today, total treatment time and time spent performing 1:1 timed codes.

## 2022-02-17 PROBLEM — O35.9XX0 KNOWN FETAL ANOMALY, ANTEPARTUM: Status: ACTIVE | Noted: 2021-06-17

## 2022-04-30 ENCOUNTER — HEALTH MAINTENANCE LETTER (OUTPATIENT)
Age: 42
End: 2022-04-30

## 2022-08-08 ENCOUNTER — LAB REQUISITION (OUTPATIENT)
Dept: LAB | Facility: CLINIC | Age: 42
End: 2022-08-08
Payer: COMMERCIAL

## 2022-08-08 DIAGNOSIS — F32.9 MAJOR DEPRESSIVE DISORDER, SINGLE EPISODE, UNSPECIFIED: ICD-10-CM

## 2022-08-08 PROCEDURE — 84443 ASSAY THYROID STIM HORMONE: CPT | Mod: ORL | Performed by: OBSTETRICS & GYNECOLOGY

## 2022-08-09 LAB — TSH SERPL DL<=0.005 MIU/L-ACNC: 3.27 UIU/ML (ref 0.3–4.2)

## 2022-09-14 NOTE — TELEPHONE ENCOUNTER
----- Message from Dorota Lamas sent at 1/14/2021  2:59 PM CST -----  General phone call:  PATIENT STATES PALPITATIONS SEEM WORSE SINCE LAST SEEN MAY 2019, UNABLE TO DO DAHIANA DUE TO INSURANCE.  NOW PATIENT HAS HAD COVID IN THE PAST, AND IS NOW PREGNANT, 16 WEEKS.  SHE IS NOW FEELING THESE A FEW TIMES A DAY, AND WANTS TO KNOW WHAT THE BEST COURSE OF ACTION IS AT THIS POINT?    Caller: PATIENT  Primary cardiologist: DR NICHOLS  Detailed reason for call: SEE ABOVE  New or active symptoms? YES, SEE ABOVE  Best phone number: [phone]  Best time to contact: ANY TIME  Ok to leave a detailed message? YES  Device? NO    Additional Info:  PATIENT SCHEDULED TO SEE DR NICHOLS 01-      Called patient and LM to return call to discuss concerns. Pt has an appt with [facility]BF scheduled next week. -[facility]   
[FreeTextEntry1] : Mr. Nicole Liu presents in follow up s/p left radiocephalic arteriovenous fistula creation, performed on 6/3/22. The fistula was revised on 7/26/22.  He is doing well post procedure. He denies any extremity weakness or paresthesia. The incision has healed. He is being dialyzed via PC. He returns today for a surveillance duplex.

## 2022-11-19 ENCOUNTER — HEALTH MAINTENANCE LETTER (OUTPATIENT)
Age: 42
End: 2022-11-19

## 2022-12-18 ENCOUNTER — HEALTH MAINTENANCE LETTER (OUTPATIENT)
Age: 42
End: 2022-12-18

## 2023-06-01 NOTE — PROGRESS NOTES
Labor progress note    S:  Pt is doing well breathing with contractions  Using nitrous now requesting suppository now  Has felt constipated  Denies any pelvic pressure or urge to push  Intact BOW no leaking fluid     O:  Blood pressure 104/53, pulse 78, temperature 99  F (37.2  C), temperature source Oral, resp. rate 18, not currently breastfeeding.  General appearance: uncomfortable with contractions.  Contractions: Every 2-5 minutes. 60-80 seconds duration.  Palpate: moderate.  FHT: Baseline 130 with mod  variability. Accelerations are present. No  decelerations present.  ROM: not ruptured.   Pelvic exam: deferred at this time  Pt will consider after suppository    Pitocin- 18 mu/min.,  Antibiotics- none    A:  IUP @ 40w1d  IOL fetal indications TOLAC    Fetal Heart rate tracing category one  GBS- negative  Patient Active Problem List   Diagnosis     Dysphonia     Family history of breast cancer in female -- maternal aunt     Family history of ovarian cancer -- Mother     Abdominal pain     Mild intermittent asthma     Cervical high risk HPV (human papillomavirus) test positive     S/P  section     Anxiety     Bulimia nervosa     COVID-19     Depressive disorder     High-risk pregnancy, elderly multigravida, unspecified trimester     At risk for ineffective breastfeeding     Known fetal anomaly, antepartum     Conceived by in vitro fertilization     Labor and delivery indication for care or intervention         P:  comfort measures prn   Anticipate   MD consultant on call Dr Mcdonald / available prn  Labor augmentation with Pitocin  reevaluate in 2-4 hours/PRN  Consider AROM    dulculax suppository now   ANDREY Mills CNM   Topical Sulfur Applications Pregnancy And Lactation Text: This medication is considered safe during pregnancy and breast feeding secondary to limited systemic absorption.

## 2023-09-08 NOTE — PROGRESS NOTES
Update CNM in dept. Patient comfort level, patient unsure if she should try to sleep, pitocin dose.  Only slept about an hour and is requesting to not increase it for an hour so she can rest.  Plan to let patient sleep, and reassess when she wakes up.   no

## 2024-01-19 ENCOUNTER — APPOINTMENT (OUTPATIENT)
Dept: URBAN - METROPOLITAN AREA CLINIC 259 | Age: 44
Setting detail: DERMATOLOGY
End: 2024-01-30

## 2024-01-19 DIAGNOSIS — L64.8 OTHER ANDROGENIC ALOPECIA: ICD-10-CM

## 2024-01-19 PROCEDURE — OTHER COUNSELING: OTHER

## 2024-01-19 PROCEDURE — OTHER MIPS QUALITY: OTHER

## 2024-01-19 PROCEDURE — 99213 OFFICE O/P EST LOW 20 MIN: CPT

## 2024-01-19 PROCEDURE — OTHER ADDITIONAL NOTES: OTHER

## 2024-01-19 NOTE — PROCEDURE: ADDITIONAL NOTES
Additional Notes: Discussed with pt all treatment options. Nutrafol, labs to get thyroid and iron levels check, topical or oral minoxidil. Also discussed ketoconazole shampoo or topical steroid. Pt will try more moisturizing shampoo first and will call if she decides to go through with topicals. I suspect she may have some neuralgia related to her recent head injury. Recommended follow up with PCP or possibly neuralgia for persistent scalp pain/headaches.
Detail Level: Simple
Render Risk Assessment In Note?: no
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
## 2024-01-28 ENCOUNTER — HEALTH MAINTENANCE LETTER (OUTPATIENT)
Age: 44
End: 2024-01-28

## 2024-03-04 ENCOUNTER — LAB REQUISITION (OUTPATIENT)
Dept: LAB | Facility: CLINIC | Age: 44
End: 2024-03-04

## 2024-03-04 DIAGNOSIS — R53.83 OTHER FATIGUE: ICD-10-CM

## 2024-03-04 LAB
ERYTHROCYTE [DISTWIDTH] IN BLOOD BY AUTOMATED COUNT: 12.1 % (ref 10–15)
HBA1C MFR BLD: 5.4 %
HCT VFR BLD AUTO: 39.8 % (ref 35–47)
HGB BLD-MCNC: 13.6 G/DL (ref 11.7–15.7)
HOLD SPECIMEN: NORMAL
HOLD SPECIMEN: NORMAL
MCH RBC QN AUTO: 33.7 PG (ref 26.5–33)
MCHC RBC AUTO-ENTMCNC: 34.2 G/DL (ref 31.5–36.5)
MCV RBC AUTO: 99 FL (ref 78–100)
PLATELET # BLD AUTO: 211 10E3/UL (ref 150–450)
RBC # BLD AUTO: 4.03 10E6/UL (ref 3.8–5.2)
WBC # BLD AUTO: 3.4 10E3/UL (ref 4–11)

## 2024-03-04 PROCEDURE — 84466 ASSAY OF TRANSFERRIN: CPT | Performed by: OBSTETRICS & GYNECOLOGY

## 2024-03-04 PROCEDURE — 85027 COMPLETE CBC AUTOMATED: CPT | Performed by: OBSTETRICS & GYNECOLOGY

## 2024-03-04 PROCEDURE — 83036 HEMOGLOBIN GLYCOSYLATED A1C: CPT | Performed by: OBSTETRICS & GYNECOLOGY

## 2024-03-04 PROCEDURE — 84443 ASSAY THYROID STIM HORMONE: CPT | Performed by: OBSTETRICS & GYNECOLOGY

## 2024-03-04 PROCEDURE — 83540 ASSAY OF IRON: CPT | Performed by: OBSTETRICS & GYNECOLOGY

## 2024-03-04 PROCEDURE — 83550 IRON BINDING TEST: CPT | Performed by: OBSTETRICS & GYNECOLOGY

## 2024-03-04 PROCEDURE — 80053 COMPREHEN METABOLIC PANEL: CPT | Performed by: OBSTETRICS & GYNECOLOGY

## 2024-03-04 PROCEDURE — 82670 ASSAY OF TOTAL ESTRADIOL: CPT | Performed by: OBSTETRICS & GYNECOLOGY

## 2024-03-04 PROCEDURE — 83735 ASSAY OF MAGNESIUM: CPT | Performed by: OBSTETRICS & GYNECOLOGY

## 2024-03-04 PROCEDURE — 82728 ASSAY OF FERRITIN: CPT | Performed by: OBSTETRICS & GYNECOLOGY

## 2024-03-05 LAB
ALBUMIN SERPL BCG-MCNC: 4.5 G/DL (ref 3.5–5.2)
ALP SERPL-CCNC: 55 U/L (ref 40–150)
ALT SERPL W P-5'-P-CCNC: 16 U/L (ref 0–50)
ANION GAP SERPL CALCULATED.3IONS-SCNC: 8 MMOL/L (ref 7–15)
AST SERPL W P-5'-P-CCNC: 37 U/L (ref 0–45)
BILIRUB SERPL-MCNC: 0.6 MG/DL
BUN SERPL-MCNC: 18.4 MG/DL (ref 6–20)
CALCIUM SERPL-MCNC: 9.5 MG/DL (ref 8.6–10)
CHLORIDE SERPL-SCNC: 102 MMOL/L (ref 98–107)
CREAT SERPL-MCNC: 0.79 MG/DL (ref 0.51–0.95)
DEPRECATED HCO3 PLAS-SCNC: 29 MMOL/L (ref 22–29)
EGFRCR SERPLBLD CKD-EPI 2021: >90 ML/MIN/1.73M2
ESTRADIOL SERPL-MCNC: 107 PG/ML
FERRITIN SERPL-MCNC: 43 NG/ML (ref 6–175)
GLUCOSE SERPL-MCNC: 83 MG/DL (ref 70–99)
IRON BINDING CAPACITY (ROCHE): 283 UG/DL (ref 240–430)
IRON SATN MFR SERPL: 35 % (ref 15–46)
IRON SERPL-MCNC: 100 UG/DL (ref 37–145)
MAGNESIUM SERPL-MCNC: 2.1 MG/DL (ref 1.7–2.3)
POTASSIUM SERPL-SCNC: 4.8 MMOL/L (ref 3.4–5.3)
PROT SERPL-MCNC: 6.9 G/DL (ref 6.4–8.3)
SODIUM SERPL-SCNC: 139 MMOL/L (ref 135–145)
TRANSFERRIN SERPL-MCNC: 232 MG/DL (ref 200–360)
TSH SERPL DL<=0.005 MIU/L-ACNC: 2.98 UIU/ML (ref 0.3–4.2)

## 2025-01-16 NOTE — PROCEDURE: MEDICATION COUNSELING
Quality 226: Preventive Care And Screening: Tobacco Use: Screening And Cessation Intervention: Patient screened for tobacco use and is an ex/non-smoker Quality 47: Advance Care Plan: Advance Care Planning discussed and documented; advance care plan or surrogate decision maker documented in the medical record. Detail Level: Detailed Topical Sulfur Applications Pregnancy And Lactation Text: This medication is Pregnancy Category C and has an unknown safety profile during pregnancy. It is unknown if this topical medication is excreted in breast milk.

## 2025-01-17 ENCOUNTER — TRANSFERRED RECORDS (OUTPATIENT)
Dept: HEALTH INFORMATION MANAGEMENT | Facility: CLINIC | Age: 45
End: 2025-01-17

## 2025-03-05 ENCOUNTER — TELEPHONE (OUTPATIENT)
Dept: CARDIOLOGY | Facility: CLINIC | Age: 45
End: 2025-03-05

## 2025-03-05 ENCOUNTER — MEDICAL CORRESPONDENCE (OUTPATIENT)
Dept: HEALTH INFORMATION MANAGEMENT | Facility: CLINIC | Age: 45
End: 2025-03-05

## 2025-03-05 ENCOUNTER — OFFICE VISIT (OUTPATIENT)
Dept: CARDIOLOGY | Facility: CLINIC | Age: 45
End: 2025-03-05
Payer: COMMERCIAL

## 2025-03-05 VITALS
BODY MASS INDEX: 19.18 KG/M2 | DIASTOLIC BLOOD PRESSURE: 75 MMHG | OXYGEN SATURATION: 100 % | SYSTOLIC BLOOD PRESSURE: 109 MMHG | WEIGHT: 128 LBS | HEART RATE: 63 BPM

## 2025-03-05 DIAGNOSIS — I47.10 SVT (SUPRAVENTRICULAR TACHYCARDIA): Primary | ICD-10-CM

## 2025-03-05 RX ORDER — ESCITALOPRAM OXALATE 10 MG/1
10 TABLET ORAL DAILY
COMMUNITY

## 2025-03-05 RX ORDER — BUPROPION HYDROCHLORIDE 150 MG/1
1 TABLET ORAL DAILY
COMMUNITY
Start: 2025-01-15 | End: 2026-01-15

## 2025-03-05 NOTE — LETTER
3/5/2025      RE: Samantha Dudley  [address]  [facility] [address]       Dear Colleague,    Thank you for the opportunity to participate in the care of your patient, Samantha Dudley, at the [facility]. Please see a copy of my visit note below.                                                                                                General Cardiology Clinic-[place]        HPI: Ms. Samantha Dudley is a 44 year old  female with PMH significant for  -Anxiety/depression  -Obstructive sleep apnea?    Patient tells me that she has been dealing with palpitations for over 20 years.  She also has diagnosis of anxiety/depression for a long time.  She has been on fluoxetine for 23 years then she was switched to Lexapro and bupropion.  10 years ago she had a Zio patch which per patient report was not revealing any significant cardiac rhythm issues.  Lately she has noticed more frequent palpitations more so at the end of the day.  5-10 times daily lasting for few seconds.  When she is feeling palpitations at times she is lightheaded, dizzy but does not pass out.  She feels her chest tightens up during palpitations.  She does not smoke.  No energy drinks.  No coffee.  Very little alcohol.    She wore a Zio patch for 2 weeks which showed 2 episodes of nonsustained SVT longest lasting 6 beats, less than 1% PAC and PVC.  Her primary care physician recommended to follow-up with cardiology.  She ended up doing a E consult with a cardiologist at [facility].  She was told her SVTs are benign.  Today she is here to discuss the Zio patch findings.  She is not on any antiarrhythmics.      No hypertension, diabetes or family history of cardiac disease.    Patient tells me that she was diagnosed with sleep apnea twice 10 years ago.  She was on CPAP for a while which she could not tolerate then stopped using it.  A year ago she had another  sleep study and she was told she has no sleep apnea.  She tells me she is very confused about sleep apnea.  She tells me that she does not sleep well.  She has 2 children who has medical issues and her quality of sleep is poor.  She is physically active.  Denies exertional chest pain, syncope, shortness of breath.    Reviewed labs from [facility] 7/3/2024 which include CMP, TSH, CBC which were all unremarkable.    Medications, personal, family, and social history reviewed with patient and revised.    PAST MEDICAL HISTORY:  Past Medical History:   Diagnosis Date     Asthma      Cervical dysplasia 01/01/2010    treated in [place], normal f/u paps     Eating disorder     previously on Prozac     Generalized anxiety disorder 06/04/2013    Problem resolved at pt request  that it be removed from problem list.        CURRENT MEDICATIONS:  Current Outpatient Medications   Medication Sig Dispense Refill     acetaminophen (TYLENOL) 325 MG tablet Take 3 tablets (975 mg) by mouth every 6 hours 100 tablet 0     albuterol (PROAIR HFA/PROVENTIL HFA/VENTOLIN HFA) 108 (90 Base) MCG/ACT inhaler albuterol sulfate HFA 90 mcg/actuation aerosol inhaler       docusate sodium (COLACE) 100 MG capsule Take 1 capsule (100 mg) by mouth 2 times daily as needed for constipation 100 capsule 0     doxylamine (UNISOM) 25 MG TABS tablet Take 25 mg by mouth At Bedtime       FLUoxetine (PROZAC) 10 MG capsule Take 30 mg by mouth daily.       gabapentin (NEURONTIN) 300 MG capsule Take 1 capsule (300 mg) by mouth 3 times daily 21 capsule 0     hydrOXYzine (VISTARIL) 25 MG capsule Take 2-4 capsules ( mg) by mouth nightly as needed (sleep) 30 capsule 0     ibuprofen (ADVIL/MOTRIN) 600 MG tablet Take 1 tablet (600 mg) by mouth every 6 hours 60 tablet 0     magnesium 250 MG tablet Take 1 tablet by mouth daily       mometasone (ASMANEX TWISTHALER) 220 MCG/INH inhaler Inhale 1 puff into the lungs every evening        multivitamin w/minerals  "(MULTI-VITAMIN) tablet Take 1 tablet by mouth daily       Omega-3 Fatty Acids (FISH OIL PO) Take by mouth daily       oxyCODONE (ROXICODONE) 5 MG tablet Take 1-2 tablets (5-10 mg) by mouth every 4 hours as needed for severe pain 30 tablet 0     polyethylene glycol (MIRALAX) 17 GM/Dose powder Take 17 g by mouth daily 510 g 1     senna-docusate (SENOKOT-S/PERICOLACE) 8.6-50 MG tablet Take 2 tablets by mouth 2 times daily 120 tablet 1     simethicone (MYLICON) 80 MG chewable tablet Take 1 tablet (80 mg) by mouth 4 times daily as needed for other (gas) 30 tablet 1     temazepam (RESTORIL) 15 MG capsule Take 1 capsule (15 mg) by mouth nightly as needed for sleep 30 capsule 0       PAST SURGICAL HISTORY:  Past Surgical History:   Procedure Laterality Date      SECTION N/A 2021    Procedure:  SECTION;  Surgeon: Rina Mcdonald MD;  [facility]     CONIZATION LEE      \"most painful thing I've ever had\"     DILATION AND CURETTAGE SUCTION N/A 2021    Procedure: DILATION AND CURETTAGE, UTERUS, USING SUCTION WITH CONCURRENT FLUOROSCOPY AND UTERINE ARTERY EMBOLIZATION,LEFT;  Surgeon: Lynette Cantrell MD;  [facility] OR     DILATION AND CURETTAGE, OPERATIVE HYSTEROSCOPY WITH MORCELLATOR, COMBINED N/A 2021    Procedure: HYSTEROSCOPY, WITH SUCTION DILATION AND CURETTAGE OF UTERUS USING MORCELLATOR;  Surgeon: Nini Gallagher MD;  [facility] OR     IR UTERINE ARTERY NON FIBROID EMBO  2021     IR VISCERAL ANGIOGRAM  2021     perianal lesion excision  child    scar on R @ 2:00 -- benign lesion      SURGICAL RADIOLOGY PROCEDURE N/A 2021    Procedure: Percutaneous Access into abnormal Uterine Vessels and with angiogram  ;  Surgeon: Faye Maurice MD;  [facility] OR     [place] teeth extraction  18       ALLERGIES:     Allergies   Allergen Reactions     Other [No Clinical Screening - See Comments] Anaphylaxis     Triamenic cough syrup.  Per patients " father when she was 5 y.o.     Cat Dander Itching     Pollen Extract Itching and Cough       FAMILY HISTORY:  Family History   Problem Relation Age of Onset     Lipids Father      Hypertension Father 72     Breast Cancer Maternal Aunt 50     Ovarian Cancer Mother 58        recurrance Xs 4     Depression Mother 50     Alcohol/Drug Paternal Grandfather      Depression Sister 24     Depression Brother 24     C.A.D. No family hx of      Diabetes No family hx of      Cerebrovascular Disease No family hx of      Cancer - colorectal No family hx of      Prostate Cancer No family hx of      Thyroid Disease No family hx of          SOCIAL HISTORY:  Social History     Tobacco Use     Smoking status: Never     Smokeless tobacco: Never   Substance Use Topics     Alcohol use: Yes     Comment: 2-3 drinks 1x/wk max     Drug use: No       ROS:   Constitutional: No fever, chills, or sweats. Weight stable.   Cardiovascular: As per HPI.       Exam:  There were no vitals taken for this visit.  GENERAL APPEARANCE: alert and no distress  HEENT: no icterus, no central cyanosis  LYMPH/NECK: no adenopathy, no asymmetry, JVP not elevated, no carotid bruits.  RESPIRATORY: lungs clear to auscultation - no rales, rhonchi or wheezes, no use of accessory muscles, no retractions, respirations are unlabored, normal respiratory rate  CARDIOVASCULAR: regular rhythm, normal S1, S2, no S3 or S4 and no murmur, click or rub, precordium quiet with normal PMI.  GI: soft, non tender  EXTREMITIES: peripheral pulses normal, no edema  NEURO: alert, normal speech,and affect  SKIN: no ecchymoses, no rashes     I have reviewed the labs and personally reviewed the imaging below and made my comment in the assessment and plan.    Labs:  CBC RESULTS:   Lab Results   Component Value Date    WBC 3.4 (L) 03/04/2024    WBC 9.3 06/30/2021    RBC 4.03 03/04/2024    RBC 3.43 (L) 06/30/2021    HGB 13.6 03/04/2024    HGB 10.8 (L) 07/02/2021    HCT 39.8 03/04/2024    HCT 35.8  06/30/2021    MCV 99 03/04/2024     (H) 06/30/2021    MCH 33.7 (H) 03/04/2024    MCH 35.6 (H) 06/30/2021    MCHC 34.2 03/04/2024    MCHC 34.1 06/30/2021    RDW 12.1 03/04/2024    RDW 13.1 06/30/2021     03/04/2024     06/30/2021       BMP RESULTS:  Lab Results   Component Value Date     03/04/2024    .0 02/05/2014    POTASSIUM 4.8 03/04/2024    POTASSIUM 4.8 07/28/2021    POTASSIUM 4.6 02/05/2014    CHLORIDE 102 03/04/2024    CHLORIDE 98 07/28/2021    CHLORIDE 101.0 02/05/2014    CO2 29 03/04/2024    CO2 28 07/28/2021    CO2 28.0 02/05/2014    ANIONGAP 8 03/04/2024    ANIONGAP 4 07/28/2021    GLC 83 03/04/2024    GLC 79 07/28/2021    GLC 95.0 02/05/2014    BUN 18.4 03/04/2024    BUN 11 07/28/2021    BUN 11.0 02/05/2014    CR 0.79 03/04/2024    CR 0.8 02/05/2014    GFRESTIMATED >90 03/04/2024    KARYN 9.5 03/04/2024    KARYN 9.0 02/05/2014            Echocardiogram  No results found for this or any previous visit (from the past 8760 hours).        CTA chest 2023: No PE.    Zio patch:  Findings:     Patient had a min HR of 46 bpm, max sinus HR of 154 bpm, and avg HR of 68 bpm.     Predominant underlying rhythm was Sinus Rhythm.     2 Supraventricular Tachycardia runs occurred, the run with the fastest   interval lasting 4 beats with a max rate of 184 bpm, the longest lasting 6   beats with an avg rate of 142 bpm.     Supraventricular Tachycardia was   detected within +/- 45 seconds of symptomatic patient event(s).     Isolated SVEs were rare (<1.0%), SVE Couplets were rare (<1.0%), and SVE   Triplets were rare (<1.0%).     Isolated VEs were rare (<1.0%), and no VE Couplets or VE Triplets were present.     Stress test 2019:  Stress Summary: Patient exercised on the Horacio protocol for a total of 9:45 minutes. Peak heart rate achieved was 161 b.p.m. The patient stopped exercise due to generalized fatigue. No chest pain symptoms were reported.     Electrocardiogram: Baseline ECG reveals normal  sinus rhythm without evidence of prior myocardial injury. With exercise, there are no significant ECG changes to suggest ischemia.    Assessment and Plan:   # Nonsustained SVT  -One of the nonsustained SVT and SVE's correspond to her symptoms (I have confirmed this reviewing Zio patch).  I have explained this to the patient and showed the Zio patch results.  I think at this time since SVTs are only very short and few in number and SVE is less than 1% do not need any medical treatment.    Plan:  -Transthoracic echocardiogram  -Repeat sleep study to rule out sleep apnea  -Consider stress management and improving sleep quality  -Return to clinic if symptoms become more frequent or longer lasting    Return to clinic as needed    Total time spent today for this visit is 84 minutes including precharting, face-to-face clinic visit, review of labs/imaging and medical documentation.    Michael BANKS MD  [facility] Division of Cardiology  Pager [phone]       Please do not hesitate to contact me if you have any questions/concerns.     Sincerely,     Michael Banks MD

## 2025-03-05 NOTE — TELEPHONE ENCOUNTER
[facility] Call Center    Phone Message    May a detailed message be left on voicemail: yes     Reason for Call: Other: patient added on for appt zio patch monitor results thru [facility], please advise, thank you       Action Taken: Other: cardiology     Travel Screening: Not Applicable     Date of Service:

## 2025-03-05 NOTE — PROGRESS NOTES
General Cardiology Clinic-[place]        HPI: Ms. Samantha Dudley is a 44 year old  female with PMH significant for  -Anxiety/depression  -Obstructive sleep apnea?    Patient tells me that she has been dealing with palpitations for over 20 years.  She also has diagnosis of anxiety/depression for a long time.  She has been on fluoxetine for 23 years then she was switched to Lexapro and bupropion.  10 years ago she had a Zio patch which per patient report was not revealing any significant cardiac rhythm issues.  Lately she has noticed more frequent palpitations more so at the end of the day.  5-10 times daily lasting for few seconds.  When she is feeling palpitations at times she is lightheaded, dizzy but does not pass out.  She feels her chest tightens up during palpitations.  She does not smoke.  No energy drinks.  No coffee.  Very little alcohol.    She wore a Zio patch for 2 weeks which showed 2 episodes of nonsustained SVT longest lasting 6 beats, less than 1% PAC and PVC.  Her primary care physician recommended to follow-up with cardiology.  She ended up doing a E consult with a cardiologist at [facility].  She was told her SVTs are benign.  Today she is here to discuss the Zio patch findings.  She is not on any antiarrhythmics.      No hypertension, diabetes or family history of cardiac disease.    Patient tells me that she was diagnosed with sleep apnea twice 10 years ago.  She was on CPAP for a while which she could not tolerate then stopped using it.  A year ago she had another sleep study and she was told she has no sleep apnea.  She tells me she is very confused about sleep apnea.  She tells me that she does not sleep well.  She has 2 children who has medical issues and her quality of sleep is poor.  She is physically active.  Denies exertional chest pain, syncope, shortness of breath.    Reviewed labs from [facility]  Everywhere 7/3/2024 which include CMP, TSH, CBC which were all unremarkable.    Medications, personal, family, and social history reviewed with patient and revised.    PAST MEDICAL HISTORY:  Past Medical History:   Diagnosis Date    Asthma     Cervical dysplasia 01/01/2010    treated in [place], normal f/u paps    Eating disorder     previously on Prozac    Generalized anxiety disorder 06/04/2013    Problem resolved at pt request  that it be removed from problem list.        CURRENT MEDICATIONS:  Current Outpatient Medications   Medication Sig Dispense Refill    acetaminophen (TYLENOL) 325 MG tablet Take 3 tablets (975 mg) by mouth every 6 hours 100 tablet 0    albuterol (PROAIR HFA/PROVENTIL HFA/VENTOLIN HFA) 108 (90 Base) MCG/ACT inhaler albuterol sulfate HFA 90 mcg/actuation aerosol inhaler      docusate sodium (COLACE) 100 MG capsule Take 1 capsule (100 mg) by mouth 2 times daily as needed for constipation 100 capsule 0    doxylamine (UNISOM) 25 MG TABS tablet Take 25 mg by mouth At Bedtime      FLUoxetine (PROZAC) 10 MG capsule Take 30 mg by mouth daily.      gabapentin (NEURONTIN) 300 MG capsule Take 1 capsule (300 mg) by mouth 3 times daily 21 capsule 0    hydrOXYzine (VISTARIL) 25 MG capsule Take 2-4 capsules ( mg) by mouth nightly as needed (sleep) 30 capsule 0    ibuprofen (ADVIL/MOTRIN) 600 MG tablet Take 1 tablet (600 mg) by mouth every 6 hours 60 tablet 0    magnesium 250 MG tablet Take 1 tablet by mouth daily      mometasone (ASMANEX TWISTHALER) 220 MCG/INH inhaler Inhale 1 puff into the lungs every evening       multivitamin w/minerals (MULTI-VITAMIN) tablet Take 1 tablet by mouth daily      Omega-3 Fatty Acids (FISH OIL PO) Take by mouth daily      oxyCODONE (ROXICODONE) 5 MG tablet Take 1-2 tablets (5-10 mg) by mouth every 4 hours as needed for severe pain 30 tablet 0    polyethylene glycol (MIRALAX) 17 GM/Dose powder Take 17 g by mouth daily 510 g 1    senna-docusate  "(SENOKOT-S/PERICOLACE) 8.6-50 MG tablet Take 2 tablets by mouth 2 times daily 120 tablet 1    simethicone (MYLICON) 80 MG chewable tablet Take 1 tablet (80 mg) by mouth 4 times daily as needed for other (gas) 30 tablet 1    temazepam (RESTORIL) 15 MG capsule Take 1 capsule (15 mg) by mouth nightly as needed for sleep 30 capsule 0       PAST SURGICAL HISTORY:  Past Surgical History:   Procedure Laterality Date     SECTION N/A 2021    Procedure:  SECTION;  Surgeon: Rina Mcdonald MD;  [facility]    CONIZATION LEEP      \"most painful thing I've ever had\"    DILATION AND CURETTAGE SUCTION N/A 2021    Procedure: DILATION AND CURETTAGE, UTERUS, USING SUCTION WITH CONCURRENT FLUOROSCOPY AND UTERINE ARTERY EMBOLIZATION,LEFT;  Surgeon: Lynette Cantrell MD;  [facility]    DILATION AND CURETTAGE, OPERATIVE HYSTEROSCOPY WITH MORCELLATOR, COMBINED N/A 2021    Procedure: HYSTEROSCOPY, WITH SUCTION DILATION AND CURETTAGE OF UTERUS USING MORCELLATOR;  Surgeon: Nini Gallagher MD;  [facility]    IR UTERINE ARTERY NON FIBROID EMBO  2021    IR VISCERAL ANGIOGRAM  2021    perianal lesion excision  child    scar on R @ 2:00 -- benign lesion     SURGICAL RADIOLOGY PROCEDURE N/A 2021    Procedure: Percutaneous Access into abnormal Uterine Vessels and with angiogram  ;  Surgeon: Faye Maurice MD;  [facility] OR    [place] teeth extraction  18       ALLERGIES:     Allergies   Allergen Reactions    Other [No Clinical Screening - See Comments] Anaphylaxis     Triamenic cough syrup.  Per patients father when she was 5 y.o.    Cat Dander Itching    Pollen Extract Itching and Cough       FAMILY HISTORY:  Family History   Problem Relation Age of Onset    Lipids Father     Hypertension Father 72    Breast Cancer Maternal Aunt 50    Ovarian Cancer Mother 58        recurrance Xs 4    Depression Mother 50    Alcohol/Drug Paternal Grandfather     Depression " Sister 24    Depression Brother 24    C.A.D. No family hx of     Diabetes No family hx of     Cerebrovascular Disease No family hx of     Cancer - colorectal No family hx of     Prostate Cancer No family hx of     Thyroid Disease No family hx of          SOCIAL HISTORY:  Social History     Tobacco Use    Smoking status: Never    Smokeless tobacco: Never   Substance Use Topics    Alcohol use: Yes     Comment: 2-3 drinks 1x/wk max    Drug use: No       ROS:   Constitutional: No fever, chills, or sweats. Weight stable.   Cardiovascular: As per HPI.       Exam:  There were no vitals taken for this visit.  GENERAL APPEARANCE: alert and no distress  HEENT: no icterus, no central cyanosis  LYMPH/NECK: no adenopathy, no asymmetry, JVP not elevated, no carotid bruits.  RESPIRATORY: lungs clear to auscultation - no rales, rhonchi or wheezes, no use of accessory muscles, no retractions, respirations are unlabored, normal respiratory rate  CARDIOVASCULAR: regular rhythm, normal S1, S2, no S3 or S4 and no murmur, click or rub, precordium quiet with normal PMI.  GI: soft, non tender  EXTREMITIES: peripheral pulses normal, no edema  NEURO: alert, normal speech,and affect  SKIN: no ecchymoses, no rashes     I have reviewed the labs and personally reviewed the imaging below and made my comment in the assessment and plan.    Labs:  CBC RESULTS:   Lab Results   Component Value Date    WBC 3.4 (L) 03/04/2024    WBC 9.3 06/30/2021    RBC 4.03 03/04/2024    RBC 3.43 (L) 06/30/2021    HGB 13.6 03/04/2024    HGB 10.8 (L) 07/02/2021    HCT 39.8 03/04/2024    HCT 35.8 06/30/2021    MCV 99 03/04/2024     (H) 06/30/2021    MCH 33.7 (H) 03/04/2024    MCH 35.6 (H) 06/30/2021    MCHC 34.2 03/04/2024    MCHC 34.1 06/30/2021    RDW 12.1 03/04/2024    RDW 13.1 06/30/2021     03/04/2024     06/30/2021       BMP RESULTS:  Lab Results   Component Value Date     03/04/2024    .0 02/05/2014    POTASSIUM 4.8 03/04/2024     POTASSIUM 4.8 07/28/2021    POTASSIUM 4.6 02/05/2014    CHLORIDE 102 03/04/2024    CHLORIDE 98 07/28/2021    CHLORIDE 101.0 02/05/2014    CO2 29 03/04/2024    CO2 28 07/28/2021    CO2 28.0 02/05/2014    ANIONGAP 8 03/04/2024    ANIONGAP 4 07/28/2021    GLC 83 03/04/2024    GLC 79 07/28/2021    GLC 95.0 02/05/2014    BUN 18.4 03/04/2024    BUN 11 07/28/2021    BUN 11.0 02/05/2014    CR 0.79 03/04/2024    CR 0.8 02/05/2014    GFRESTIMATED >90 03/04/2024    KARYN 9.5 03/04/2024    KARYN 9.0 02/05/2014            Echocardiogram  No results found for this or any previous visit (from the past 8760 hours).        CTA chest 2023: No PE.    Zio patch:  Findings:     Patient had a min HR of 46 bpm, max sinus HR of 154 bpm, and avg HR of 68 bpm.     Predominant underlying rhythm was Sinus Rhythm.     2 Supraventricular Tachycardia runs occurred, the run with the fastest   interval lasting 4 beats with a max rate of 184 bpm, the longest lasting 6   beats with an avg rate of 142 bpm.     Supraventricular Tachycardia was   detected within +/- 45 seconds of symptomatic patient event(s).     Isolated SVEs were rare (<1.0%), SVE Couplets were rare (<1.0%), and SVE   Triplets were rare (<1.0%).     Isolated VEs were rare (<1.0%), and no VE Couplets or VE Triplets were present.     Stress test 2019:  Stress Summary: Patient exercised on the Horacio protocol for a total of 9:45 minutes. Peak heart rate achieved was 161 b.p.m. The patient stopped exercise due to generalized fatigue. No chest pain symptoms were reported.     Electrocardiogram: Baseline ECG reveals normal sinus rhythm without evidence of prior myocardial injury. With exercise, there are no significant ECG changes to suggest ischemia.    Assessment and Plan:   # Nonsustained SVT  -One of the nonsustained SVT and SVE's correspond to her symptoms (I have confirmed this reviewing Zio patch).  I have explained this to the patient and showed the Zio patch results.  I think at this  time since SVTs are only very short and few in number and SVE is less than 1% do not need any medical treatment.    Plan:  -Transthoracic echocardiogram  -Repeat sleep study to rule out sleep apnea  -Consider stress management and improving sleep quality  -Return to clinic if symptoms become more frequent or longer lasting    Return to clinic as needed    Total time spent today for this visit is 84 minutes including precharting, face-to-face clinic visit, review of labs/imaging and medical documentation.    Michael BANKS MD  [facility] Division of Cardiology  Pager [phone]

## 2025-03-05 NOTE — PATIENT INSTRUCTIONS
Thank you for coming to the [facility] at [place]; please note the following instructions:    1. Echocardiogram - to schedule at [place] [phone]    2. Sleep Study Referral - You will be called to schedule this    3. If your symptoms would worsen, please call clinic at # [phone]    4. Follow up as needed with Cardiology        If you have any questions regarding your visit, please contact your care team:     CARDIOLOGY  TELEPHONE NUMBER   Cyndee WEBB., Registered Nurse  Natacha KNIGHT, Registered Nurse  Marie AYOUB, Registered Medical Assistant  Renetta KAUFMAN, Clinic Assistant  Claire KNIGHT, Visit Facilitator  Steven AYOUB, Visit Facilitator [phone] (select option 1)    *After hours: [phone]   For Scheduling Appts:     [phone] (select option 1)    *After hours: [phone]   For the Device Clinic (Pacemakers and ICD's)  Jessica GAMEZ, Registered Nurse   During business hours: [phone]    *After business hours:  [phone] (select option 4)      Normal test result notifications will be released via [employer] or mailed within 7 business days.  All other test results, will be communicated via telephone once reviewed by your cardiologist.    If you need a medication refill, please contact your pharmacy.  Please allow 3 business days for your refill to be completed.    As always, thank you for trusting us with your health care needs!

## 2025-03-05 NOTE — TELEPHONE ENCOUNTER
Patient added to Dr. Salmeron schedule for appointment today. Patient had Zio Patch worn through [employer]. Provider unable to see Zio Patch strips. Writer unable to obtain from Zio Patch website. Call to [facility] cardiology to receive results of Zio Patch. Spoke to HIM representative from [facility] who confirmed they will send the Zio Patch results to [facility].    MIKEL Smith

## 2025-03-26 ENCOUNTER — ANCILLARY PROCEDURE (OUTPATIENT)
Dept: CARDIOLOGY | Facility: CLINIC | Age: 45
End: 2025-03-26
Attending: INTERNAL MEDICINE
Payer: COMMERCIAL

## 2025-03-26 DIAGNOSIS — I47.10 SVT (SUPRAVENTRICULAR TACHYCARDIA): ICD-10-CM

## 2025-03-26 LAB — LVEF ECHO: NORMAL

## 2025-03-26 PROCEDURE — 93306 TTE W/DOPPLER COMPLETE: CPT | Performed by: INTERNAL MEDICINE

## 2025-05-28 ENCOUNTER — OFFICE VISIT (OUTPATIENT)
Dept: SLEEP MEDICINE | Facility: CLINIC | Age: 45
End: 2025-05-28
Attending: INTERNAL MEDICINE
Payer: COMMERCIAL

## 2025-05-28 VITALS
HEART RATE: 72 BPM | WEIGHT: 124.8 LBS | SYSTOLIC BLOOD PRESSURE: 114 MMHG | OXYGEN SATURATION: 100 % | HEIGHT: 69 IN | DIASTOLIC BLOOD PRESSURE: 74 MMHG | BODY MASS INDEX: 18.48 KG/M2

## 2025-05-28 DIAGNOSIS — G47.33 OSA (OBSTRUCTIVE SLEEP APNEA): Primary | ICD-10-CM

## 2025-05-28 DIAGNOSIS — I47.10 SVT (SUPRAVENTRICULAR TACHYCARDIA): ICD-10-CM

## 2025-05-28 PROCEDURE — 3078F DIAST BP <80 MM HG: CPT | Performed by: INTERNAL MEDICINE

## 2025-05-28 PROCEDURE — 99204 OFFICE O/P NEW MOD 45 MIN: CPT | Performed by: INTERNAL MEDICINE

## 2025-05-28 PROCEDURE — 3074F SYST BP LT 130 MM HG: CPT | Performed by: INTERNAL MEDICINE

## 2025-05-28 PROCEDURE — 1126F AMNT PAIN NOTED NONE PRSNT: CPT | Performed by: INTERNAL MEDICINE

## 2025-05-28 ASSESSMENT — SLEEP AND FATIGUE QUESTIONNAIRES
HOW LIKELY ARE YOU TO NOD OFF OR FALL ASLEEP WHILE SITTING AND READING: SLIGHT CHANCE OF DOZING
HOW LIKELY ARE YOU TO NOD OFF OR FALL ASLEEP WHILE LYING DOWN TO REST IN THE AFTERNOON WHEN CIRCUMSTANCES PERMIT: HIGH CHANCE OF DOZING
HOW LIKELY ARE YOU TO NOD OFF OR FALL ASLEEP IN A CAR, WHILE STOPPED FOR A FEW MINUTES IN TRAFFIC: WOULD NEVER DOZE
HOW LIKELY ARE YOU TO NOD OFF OR FALL ASLEEP WHEN YOU ARE A PASSENGER IN A CAR FOR AN HOUR WITHOUT A BREAK: HIGH CHANCE OF DOZING
HOW LIKELY ARE YOU TO NOD OFF OR FALL ASLEEP WHILE WATCHING TV: SLIGHT CHANCE OF DOZING
HOW LIKELY ARE YOU TO NOD OFF OR FALL ASLEEP WHILE SITTING INACTIVE IN A PUBLIC PLACE: SLIGHT CHANCE OF DOZING
HOW LIKELY ARE YOU TO NOD OFF OR FALL ASLEEP WHILE SITTING QUIETLY AFTER LUNCH WITHOUT ALCOHOL: SLIGHT CHANCE OF DOZING
HOW LIKELY ARE YOU TO NOD OFF OR FALL ASLEEP WHILE SITTING AND TALKING TO SOMEONE: WOULD NEVER DOZE

## 2025-05-28 NOTE — PROGRESS NOTES
"    Name: Samantha Dudley MRN# [id number]   Age: 44 year old YOB: 1980     Date of Consultation: May 28, 2025  Consultation is requested by: Michael Salmeron MD  [address] [place] [id number] Michael Salmeron  Primary care provider: No Ref-Primary, Physician       Reason for Sleep Consult:     Samantha Dudley is sent by Michael Salmeron for a sleep consultation regarding          Assessment and Plan:     Summary Sleep Diagnoses:  Remote history of mild obstructive sleep apnea with subsequent < 5% body weight reduction  Insomnia  Anxiety  Childcare responsibilities with sleep disruption at night  Atypical restless legs with infrequent difficulty initiating sleep  Prior history of parsomnias with night terrors currently decreasing in frequency    Comorbid Diagnoses:  Depressive disorder  Mild intermittent asthma  Anxiety  Summary Recommendations(things to be done):  Polysomnography for current evaluation of sleep apnea and consideration for mandibular advancement device therapy for significant disease  Counseling regarding optimization of sleep schedule-we provided information on components and interventions for cognitive behavioral therapy for insomnia-patient should review information and after visit summary on CBT-I         HISTORY PRESENT ILLNESS:     Samantha Dudley is a 44 year old year old with history of mild nonpositional obstructive sleep apnea in  with subsequent less than 5% body weight reduction and without clinical benefit with CPAP therapy. \"I always have been a bad sleeper\" with restlessness with difficulty initiating and maintaining sleep. She snores on most nights with gasping respiration and disturbed sleep apnea. She has sleep disruption due to care for 4 year old and autistic son.       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  Wakes up due to (Patient-Rptd) Snorting self awake, Use the bathroom  She has trouble falling back asleep   times a week.   It usually takes   to get back to sleep  Patient is usually up at (Patient-Rptd) 6 am  Uses alarm: (Patient-Rptd) Yes    Weekends/Non-work Days/All Other Days:  Usually gets into bed at (Patient-Rptd) 10:30   Takes patient about (Patient-Rptd) 10 min to fall asleep  Patient is usually up at (Patient-Rptd) 7  Uses alarm: (Patient-Rptd) No    Sleep Need  Patient gets  (Patient-Rptd) 6 or 7 hours sleep on average   Patient thinks she needs about (Patient-Rptd) 9 houra sleep    Samantha Dudley prefers to sleep in this position(s): (Patient-Rptd) Side   Patient states they do the following activities in bed: (Patient-Rptd) Read, Use phone, computer, or tablet    Naps  Patient takes a purposeful nap (Patient-Rptd) 1 times a week and naps are usually (Patient-Rptd) 30 in duration  She feels better after a nap: (Patient-Rptd) Yes  She dozes off unintentionally (Patient-Rptd) 1x week on couch days per week  Patient has had a driving accident or near-miss due to sleepiness/drowsiness: (Patient-Rptd) No      SLEEP DISRUPTIONS:    Breathing/Snoring  Snoring:(Patient-Rptd) Yes  Other people complain about her snoring: (Patient-Rptd) Yes  Others observeshe stops breathing in her sleep: (Patient-Rptd) Yes  She has issues with the following: (Patient-Rptd) Morning headaches, Getting up to urinate more than once    Movement:  Mother had RLS  Tingling without contributing to difficult falling asleep  Pain,  discomfort, with an urge to move:  (Patient-Rptd) No  Happens when she is resting:  (Patient-Rptd) Yes  Happens more at night:     Patient has been told she kicks her legs at night:        Behaviors in Wakefulness/Sleep:                    Samantha Dudley has experienced the following behaviors while sleeping: (Patient-Rptd) Sleep-talking, Teeth grinding, Night terrors (screaming,yelling or acting afraid but not recalling event)  She has experienced sudden muscle weakness during the day: (Patient-Rptd) No      Is there anything else you would like your sleep provider to know: (Patient-Rptd) 3 studies 2diagnosis 1non  diagnosis      CAFFEINE AND OTHER SUBSTANCES:    Patient consumes caffeinated beverages per day:  (Patient-Rptd) 0  Last caffeine use is usually: (Patient-Rptd) none  List of any prescribed or over the counter stimulants that patient takes: (Patient-Rptd) none sometimes adhd med  List of any prescribed or over the counter sleep medication patient takes: (Patient-Rptd) none  List of previous sleep medications that patient has tried: (Patient-Rptd) unisom melatonin others  Patient drinks alcohol to help them sleep: (Patient-Rptd) No  Patient drinks alcohol near bedtime: (Patient-Rptd) No    Family History:  Patient has a family member been diagnosed with a sleep disorder: (Patient-Rptd) Yes  (Patient-Rptd) sister abd brother both sleep apnea               SCALES:       EPWORTH SLEEPINESS SCALE         7/15/2019    12:05 PM    [place] Sleepiness Scale ( FABRICIO Dougherty  [id number]<br>ESS - [place]/English - Final version - 21 Nov 07 - [facility] [place].)   [place] Score (Sleep) 12         INSOMNIA SEVERITY INDEX (CADENCE)          7/15/2019    12:05 PM   Insomnia Severity Index (CADENCE)   Difficulty falling asleep 1    Difficulty staying asleep 2    Problems waking up too early 2    How SATISFIED/DISSATISFIED are you with your CURRENT sleep pattern? 2    How NOTICEABLE to others do you think your sleep problem is in  terms of impairing the quality of your life? 2    How WORRIED/DISTRESSED are you about your current sleep problem? 2    To what extent do you consider your sleep problem to INTERFERE with your daily functioning (e.g. daytime fatigue, mood, ability to function at work/daily chores, concentration, memory, mood, etc.) CURRENTLY? 3    CADENCE Total Score 14       Data saved with a previous flowsheet row definition       Guidelines for Scoring/Interpretation:  Total score categories:  0-7 = No clinically significant insomnia   8-14 = Subthreshold insomnia   15-21 = Clinical insomnia (moderate severity)  22-28 = Clinical insomnia (severe)  Used via courtesy of www.Rormixealth.va.gov with permission from Michael Hernandez PhD., [facility]      STOP BANG         3/5/2025     4:07 PM   STOP BANG Questionnaire (  2008, the American Society of Anesthesiologists, Inc. [employer]   B/P Clinic: 109/75       PATIENT HEALTH QUESTIONNAIRE-9 (PHQ - 9)        11/3/2014     9:00 AM   PHQ-9 ([employer])   No Interest In Doing Things 0   Feeling Depressed 0   Trouble Sleeping 0   Tired / No Energy 0   No appetite or Over-Eating 0   Feeling Bad about Self 0   Trouble Concentrating 0   Moving Slow or Restless 0   Suicidal Thoughts 0   Total Score 0       Developed by Belem Mercado, Mirna Good, Fortino Wisdom and colleagues, with an educational teena from [employer] No permission required to reproduce, translate, display or distribute.        Allergies:    Allergies   Allergen Reactions    Other [No Clinical Screening - See Comments] Anaphylaxis     Triamenic cough syrup.  Per patients father when she was 5 y.o.    Cat Dander Itching    Pollen Extract Itching and Cough            Problem List:     Patient Active Problem List   Diagnosis    Dysphonia    Family history of breast cancer in female -- maternal aunt    Family history of ovarian cancer -- Mother    Abdominal pain    Mild intermittent asthma     Cervical high risk HPV (human papillomavirus) test positive    S/P  section    Anxiety    Bulimia nervosa (H)    COVID-19    Depressive disorder    Known fetal anomaly, antepartum    Labor and delivery indication for care or intervention    Retained products of conception after delivery without hemorrhage    Abdominal pain, right lower quadrant    Abnormal uterine bleeding, postpartum    Active bleeding            MEDICATIONS:     Current Outpatient Medications   Medication Sig Dispense Refill    acetaminophen (TYLENOL) 325 MG tablet Take 3 tablets (975 mg) by mouth every 6 hours 100 tablet 0    albuterol (PROAIR HFA/PROVENTIL HFA/VENTOLIN HFA) 108 (90 Base) MCG/ACT inhaler albuterol sulfate HFA 90 mcg/actuation aerosol inhaler      buPROPion (WELLBUTRIN XL) 150 MG 24 hr tablet Take 1 tablet by mouth daily.      docusate sodium (COLACE) 100 MG capsule Take 1 capsule (100 mg) by mouth 2 times daily as needed for constipation (Patient not taking: Reported on 3/5/2025) 100 capsule 0    doxylamine (UNISOM) 25 MG TABS tablet Take 25 mg by mouth At Bedtime (Patient not taking: Reported on 3/5/2025)      escitalopram (LEXAPRO) 10 MG tablet Take 10 mg by mouth daily.      FLUoxetine (PROZAC) 10 MG capsule Take 30 mg by mouth daily. (Patient not taking: Reported on 3/5/2025)      gabapentin (NEURONTIN) 300 MG capsule Take 1 capsule (300 mg) by mouth 3 times daily (Patient not taking: Reported on 3/5/2025) 21 capsule 0    hydrOXYzine (VISTARIL) 25 MG capsule Take 2-4 capsules ( mg) by mouth nightly as needed (sleep) (Patient not taking: Reported on 3/5/2025) 30 capsule 0    ibuprofen (ADVIL/MOTRIN) 600 MG tablet Take 1 tablet (600 mg) by mouth every 6 hours 60 tablet 0    magnesium 250 MG tablet Take 1 tablet by mouth daily (Patient not taking: Reported on 3/5/2025)      mometasone (ASMANEX TWISTHALER) 220 MCG/INH inhaler Inhale 1 puff into the lungs every evening  (Patient not taking: Reported on 3/5/2025)       multivitamin w/minerals (MULTI-VITAMIN) tablet Take 1 tablet by mouth daily      Omega-3 Fatty Acids (FISH OIL PO) Take by mouth daily      oxyCODONE (ROXICODONE) 5 MG tablet Take 1-2 tablets (5-10 mg) by mouth every 4 hours as needed for severe pain (Patient not taking: Reported on 3/5/2025) 30 tablet 0    polyethylene glycol (MIRALAX) 17 GM/Dose powder Take 17 g by mouth daily 510 g 1    senna-docusate (SENOKOT-S/PERICOLACE) 8.6-50 MG tablet Take 2 tablets by mouth 2 times daily (Patient not taking: Reported on 3/5/2025) 120 tablet 1    simethicone (MYLICON) 80 MG chewable tablet Take 1 tablet (80 mg) by mouth 4 times daily as needed for other (gas) (Patient not taking: Reported on 3/5/2025) 30 tablet 1    temazepam (RESTORIL) 15 MG capsule Take 1 capsule (15 mg) by mouth nightly as needed for sleep (Patient not taking: Reported on 3/5/2025) 30 capsule 0       Problem List:  Patient Active Problem List    Diagnosis Date Noted    Abdominal pain 06/04/2013     Priority: High    Abdominal pain, right lower quadrant 07/18/2021     Priority: Medium    Abnormal uterine bleeding, postpartum 07/18/2021     Priority: Medium    Active bleeding 07/18/2021     Priority: Medium    Retained products of conception after delivery without hemorrhage 07/14/2021     Priority: Medium    Labor and delivery indication for care or intervention 06/30/2021     Priority: Medium    COVID-19 06/17/2021     Priority: Medium     At 7 wks pregnant      Known fetal anomaly, antepartum 06/17/2021     Priority: Medium     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 Possible surgery needs after birth.  Fetal chart opened by [facility]-detailed info/plans.  Need NICU at birth and let NICU team know when patient is in labor and about her requests below.  CORD BLOOD ORDERS IN FETAL CHART -  5ml in green toptube & 5 ml in purple top     Pt desires baby to her chest, delayed cord clamping if no concerns.    Per Guero 6/17:    Instructions for testing is  "as follows:  Testing to be performed on cord blood after delivery:   [id number] CGH with SNP array with limited G-bands. 5mL Green Sodium Heparin AND 5 mL Purple EDTA.  Ilp1952 Next Generation Sequencin-10mLs cordblood in yellow ACD (solution A) tube OR purple EDTA tube. Yellow top tube preferred      Bulimia nervosa (H) 2020     Priority: Medium    Depressive disorder 2020     Priority: Medium    Anxiety 2017     Priority: Medium     : Stable on fluoxetine 30mg      Family history of breast cancer in female -- maternal aunt 2013     Priority: Medium    Family history of ovarian cancer -- Mother 2013     Priority: Medium    Dysphonia 2012     Priority: Medium    S/P  section 2010     Priority: Medium     Abbott-face presentation. Traumatic experience  : further chart review found no TOLAC consent signed prior to 38wks. OR report in care everywhere. Called patient to review risks/benefits to TOLAC, reviewed risk of rupture, cEFM, IV. Discussed her option to repeat CS at anytime. Will have patient sign consent at next visit.      Mild intermittent asthma 2013     Priority: Low     URI and exercise induced; worse in winter. Has rescue inhaler.      Cervical high risk HPV (human papillomavirus) test positive 2013     Priority: Low     2014: Dx pap w cotest 2013 ASCCP guidelines. ksl          Past Medical/Surgical History:  Past Medical History:   Diagnosis Date    Asthma     Cervical dysplasia 2010    treated in [place], normal f/u paps    Eating disorder     previously on Prozac    Generalized anxiety disorder 2013    Problem resolved at pt request  that it be removed from problem list.      Past Surgical History:   Procedure Laterality Date     SECTION N/A 2021    Procedure:  SECTION;  Surgeon: Rina Mcdonald MD;  Location: [facility] L[facility]    CONIZATION LEEP      \"most painful thing I've ever had\"    " DILATION AND CURETTAGE SUCTION N/A 7/19/2021    Procedure: DILATION AND CURETTAGE, UTERUS, USING SUCTION WITH CONCURRENT FLUOROSCOPY AND UTERINE ARTERY EMBOLIZATION,LEFT;  Surgeon: Lynette Cantrell MD;  Location: [facility] OR    DILATION AND CURETTAGE, OPERATIVE HYSTEROSCOPY WITH MORCELLATOR, COMBINED N/A 7/14/2021    Procedure: HYSTEROSCOPY, WITH SUCTION DILATION AND CURETTAGE OF UTERUS USING MORCELLATOR;  Surgeon: Nini Gallagher MD;  Location: [facility] OR    IR UTERINE ARTERY NON FIBROID EMBO  7/18/2021    IR VISCERAL ANGIOGRAM  7/19/2021    perianal lesion excision  child    scar on R @ 2:00 -- benign lesion     SURGICAL RADIOLOGY PROCEDURE N/A 7/19/2021    Procedure: Percutaneous Access into abnormal Uterine Vessels and with angiogram  ;  Surgeon: Faye Maurice MD;  Location: [facility] OR    [facility] teeth extraction  17       Social History:  Social History     Socioeconomic History    Marital status:      Spouse name: kaushik    Number of children: 1    Years of education: Not on file    Highest education level: Not on file   Occupational History    Not on file   Tobacco Use    Smoking status: Never    Smokeless tobacco: Never   Substance and Sexual Activity    Alcohol use: Yes     Comment: 2-3 drinks 1x/wk max    Drug use: No    Sexual activity: Yes     Partners: Male     Birth control/protection: OCP   Other Topics Concern     Service No    Blood Transfusions No    Caffeine Concern No    Occupational Exposure No    Hobby Hazards No    Sleep Concern Yes     Comment: follows with psychiatry    Stress Concern Yes     Comment: broke off engagement last week    Weight Concern Yes     Comment: 7 # weight loss in 6 weeks    Special Diet Yes     Comment: adding supplement    Back Care No    Exercise Yes     Comment: tries to walk,    Bike Helmet Yes    Seat Belt Yes    Self-Exams No   Social History Narrative    Health Care Maintenance:    Last Pap:2/2011    Vaccinations:flu shot this year, tetanus up to  date    TSH:not recent    Fasting glucose:hasn't had    Lipids:hasn't had    Mammogram:n/a    Colonoscopy:n/a    Dexa:n/a        How much exercise per week? Once a week, walk/run, sporting aerobic    How much calcium per day? Glass a milk a day       How much caffeine per day? none    How much vitamin D per day? Don't take it, multivitamin, whatever the sun gives her    Do you/your family wear seatbelts?  Yes    Do you/your family use safety helmets? Yes    Do you/your family use sunscreen? Yes    Do you/your family keep firearms in the home? No    Do you/your family have a smoke detector(s)? Yes        Do you feel safe in your home? Yes    Has anyone ever touched you in an unwanted manner? No         Jose CRYSTAL LPN 6/4/2013                             Social Drivers of Health     Financial Resource Strain: Low Risk  (11/30/2022)    Received from [facility]    Overall Financial Resource Strain (CARDIA)     Difficulty of Paying Living Expenses: Not hard at all   Food Insecurity: No Food Insecurity (10/3/2024)    [facility]    Hunger Vital Sign     Worried About Running Out of Food in the Last Year: Never true     Ran Out of Food in the Last Year: Never true   Transportation Needs: No Transportation Needs (10/3/2024)    [facility]    PRAPARE - Transportation     Lack of Transportation (Medical): No     Lack of Transportation (Non-Medical): No   Physical Activity: Insufficiently Active (7/9/2024)    Received from [facility]    Exercise Vital Sign     Days of Exercise per Week: 2 days     Minutes of Exercise per Session: 40 min   Stress: Stress Concern Present (11/30/2022)    Received from [facility]    [place] [employer] - Occupational Stress Questionnaire     Feeling of Stress : Rather much   Social Connections: Moderately Integrated (11/30/2022)    Received from [facility]    Social Connection and Isolation Panel [NHANES]     Frequency of Communication with  "Friends and Family: More than three times a week     Frequency of Social Gatherings with Friends and Family: Once a week     Attends Tenriism Services: 1 to 4 times per year     Active Member of Clubs or Organizations: No     Attends Club or Organization Meetings: Never     Marital Status:    Interpersonal Safety: Not At Risk (11/30/2022)    Received from [facility]    Humiliation, Afraid, Rape, and Kick questionnaire     Fear of Current or Ex-Partner: No     Emotionally Abused: No     Physically Abused: No     Sexually Abused: No   Housing Stability: Unknown (10/3/2024)    Received from [facility]    Housing Stability Vital Sign     Unable to Pay for Housing in the Last Year: No     Number of Times Moved in the Last Year: Not on file     Homeless in the Last Year: No       Family History:  Family History   Problem Relation Age of Onset    Lipids Father     Hypertension Father 72    Breast Cancer Maternal Aunt 50    Ovarian Cancer Mother 58        recurrance Xs 4    Depression Mother 50    Alcohol/Drug Paternal Grandfather     Depression Sister 24    Depression Brother 24    C.A.D. No family hx of     Diabetes No family hx of     Cerebrovascular Disease No family hx of     Cancer - colorectal No family hx of     Prostate Cancer No family hx of     Thyroid Disease No family hx of             Physical Examination:     /74   Pulse 72   Ht 1.74 m (5' 8.5\")   Wt 56.6 kg (124 lb 12.8 oz)   SpO2 100%   BMI 18.70 kg/m      GENERAL: alert and no distress  EYES: Eyes grossly normal to inspection.  No discharge or erythema, or obvious scleral/conjunctival abnormalities.  RESP: No audible wheeze, cough, or visible cyanosis.    SKIN: Visible skin clear. No significant rash, abnormal pigmentation or lesions.  NEURO: Cranial nerves grossly intact.  Mentation and speech appropriate for age.  PSYCH: Appropriate affect, tone, and pace of words                  Data: All pertinent previous laboratory data " "reviewed     Recent Labs   Lab Test 03/04/24  1056 07/28/21  1357    130*   POTASSIUM 4.8 4.8   CHLORIDE 102 98   CO2 29 28   ANIONGAP 8 4   GLC 83 79   BUN 18.4 11   CR 0.79 0.72   KARYN 9.5 8.4*       Recent Labs   Lab Test 03/04/24  1056   WBC 3.4*   RBC 4.03   HGB 13.6   HCT 39.8   MCV 99   MCH 33.7*   MCHC 34.2   RDW 12.1          Recent Labs   Lab Test 03/04/24  1056   PROTTOTAL 6.9   ALBUMIN 4.5   BILITOTAL 0.6   ALKPHOS 55   AST 37   ALT 16       TSH   Date Value   03/04/2024 2.98 uIU/mL   08/08/2022 3.27 uIU/mL   03/30/2012 1.49 mU/L       No results found for: \"UAMP\", \"UBARB\", \"BENZODIAZEUR\", \"UCANN\", \"UCOC\", \"OPIT\", \"UPCP\"    Iron Sat Index   Date/Time Value Ref Range Status   03/04/2024 10:56 AM 35 15 - 46 % Final     Ferritin   Date/Time Value Ref Range Status   03/04/2024 10:56 AM 43 6 - 175 ng/mL Final   03/30/2012 03:25 PM 31 10 - 120 ng/mL Final         CAROLINE BROWN MD 5/28/2025     Total time spent reviewing medical records including previous testing and interpretation as well as direct patient contact and documentation on this date: 45 minutes  "

## 2025-05-28 NOTE — PATIENT INSTRUCTIONS
[facility] Brief CBT-I  Introductory Module    Understanding Sleep and Insomnia    Most people have trouble sleeping at some point in their life.   Chronic insomnia means you have had trouble falling asleep and/or staying asleep for at least the past three months.  Despite allowing enough time for sleep, it is affecting how you feel. You are not alone.  It is estimated that 10-15% of adults experience chronic insomnia.     Cognitive-Behavioral Therapy for Insomnia    The American College of Physicians  recommends Cognitive Behavioral Therapy for Insomnia (CBT-I) as the first-line treatment for insomnia.     [facility] Brief CBT-I is a five-step program involving an initial insomnia assessment and four treatment sessions.  It is designed to be completed with the guidance of a trained health care provider. The program will teach you the skills and strategies you will need for a better night's sleep. The first step in your program involves an assessment of your insomnia and learning a bit about sleep, insomnia and CBT-I.      The Basics of Sleep    How does sleep help us?    Sleep, like food and water, is something we need every day but whose purpose is not exactly clear.  Sleep experts agree we need consistent quality sleep to function at our best. There is evidence that sleep helps maintain brain and body functions.  It helps maintain thinking ability and mood.  Sleep is a very active state that involves four stages that cycle every  minutes throughout the night. We get our deepest sleep during the first few hours of sleep.  During the last half of our sleep, we usually get the bulk of our REM (Rapid Eye Movement) sleep.  REM sleep is when most of our dreaming occurs.     Watch the following short video to learn more:  Stages of Sleep      How much sleep do we need?    Sleep needs vary from person to person. Most adults need at least 7 hours of sleep though some may need less and others more.   Sleeping too little or too much may be a health risk.  As we age, most people report their sleep gets lighter, earlier, shorter, and more restless.     What Controls Sleep?    The three things that regulate your sleep are your:     Sleep Drive  Biological Clock  Arousal System (physical and emotional states)    Together these make us feel alert during the day and promote sleep at night.    Your sleep drive depends on how long you have been awake. It is lowest when you first wake up.  Sleep drive increases as the day goes on.  The longer time you are awake the easier it is to fall asleep.  Sleeping gradually reduces your sleep drive. That is why napping in the evening or close to bed can make it harder to sleep at night.    Your biological clock promotes wakefulness during the day and sleep at night.    Adapted from Jorge et al. (2009). Evaluation and Management of Insomnia in the Psychiatric setting. Published Online: 19/1/2009; DOI: https://doi.org/10.1176/foc.7.4.[email or web address]    Watch the following short video to learn more: Two  Processes of Sleep    How does sleep change with age?    Sleep usually is lighter and shorter and earlier.  Sleep may become more restless   Increased time to fall asleep and more time awake during the night    Understanding Insomnia    What is insomnia?    Insomnia occurs when you:    Have difficulty with...  Falling Asleep  Staying Asleep  Or short amount of sleep    Have adequate time for sleep  Experience daytime problems as a result of your sleep difficulties    What causes insomnia?    Some people have a greater chance of developing insomnia due to biological, psychological or social factors. These are often referred to as predisposing factors.  A host of things can trigger insomnia such as a stressful event, jet lag, working a different shift, medication, or the onset of a medical or mental health condition. These are called precipitating factors.        What maintains  "insomnia?    Short-term insomnia can become chronic because of habits or conditions that perpetuate it including:    Unhelpful sleep habits such as spending more time in bed and sleeping in on weekends to try to catch up on sleep  Stress, worry or depression  Worry or fear about your insomnia  Pain or other medical conditions  Some medications  Untreated sleep disorders    As you spend more time in bed or try forcing yourself to sleep your bed becomes linked with wakefulness.  This type of  conditioning  along with unhelpful sleep habits maintains the insomnia even when the triggering event has resolved.    Sleep and Your Arousal System    Mental activity, emotions and physical symptoms can make your brain too active to sleep by masking the strength of your sleep drive. Your brain's arousal system can triggers insomnia and plan a role in maintaining it.  Common sources of arousal include:    Worry about sleep  An active mind concerned about unfinished tasks  Anxiety, stress and depression  Pain    The Effect of Behavioral Conditioning    When you lay awake in bed over many nights, your body actually becomes trained or 'conditioned' to be awake during the night.  It makes your bed trigger alertness instead of sleepiness.  CBT-I helps strengthen the behavioral association between sleep and your bed.    Habits that HURT sleep:    Using your bed for things other than sleep and intimacy  Shifting sleep schedules from night to night  Extending time in bed  Worrying  Worrying about sleep or trying too hard to sleep  Lack of a \"wind down\" time before bed  Long or late naps  Uncomfortable sleep environment  Alcohol  Caffeine    Developing habits that HELP your sleep:    Keeping the bed for sleep and intimacy  Maintaining a consistent sleep schedule  Daily routines including time to wind down and manage worry  Managing thoughts and expectations about sleep    Common Treatments for Insomnia    Behavioral:  Changing your " "behavior and habits to improve your sleep  Sleeping Pills (Prescription and OTC)  Antidepressants   \"Alternative\" remedies (Melatonin, Ashwagandha, Chamomile, Valerian, 5-HTP etc.)    How CBT-I Works     CBT-I targets negative behavioral conditioning and habits that hurt your sleep and lead to long-term insomnia     How long will it take to work?    Research shows that making significant changes in sleep habits takes time and effort. You  may see improvement within 2-3 weeks but will need to maintain these new habits over time for the best results.  Progress is not always steady. Don't lose heart if you experience minor setbacks along the way. While sleep medications may work faster, they often come with considerable side effects and are less effective over time requiring  increasing the dose or switching to a different medication.   The evidence is clear that CBT-I is the safest and most effective long-term solution for treatment of insomnia.     Are there any side effects?    CBT-I has been shown to be a safe and effective treatment with few side effects.  The most common early side effect you may experience is a short term increase in daytime sleepiness.  It will be important for you to manage you sleepiness by avoiding driving or operating equipment if sleepy or drowsy.     Is CBT-I right for you?    CBT-I has been shown to be effective in treating insomnia across a variety of age groups and with individuals who have other health conditions.    You may be a good candidate for CBT-I if:    You believe CBT-I can help you sleep better.  You are motivated to follow a four-session behavioral treatment program based on your insomnia assessment..   You are able (or have assistance) to complete a daily sleep diary  Your Medical and/or mental health conditions are stable and treated.  You do not have untreated Sleep Apnea or Restless Leg syndrome.    There are several health conditions where CBT-I may not be " indicated:    Bipolar Disorder  Seizure Disorder  Shift-work Sleep Disorder  Sleep Walking  Night Terrors  Excessive Daytime Sleepiness    Tracking your Sleep     Sleep tracking is an essential part of your CBT-I program. There are several ways to keep track of your sleep during treatment with your health care provider. Whatever method you use, it is best to record your information first thing when you get up in the morning.    CBT-i     The CBT-I  luz is a convenient way to keep track of your sleep during treatment with your health care provider. It also has helpful information and tools that make it a great digital  to your treatment.  Download the free luz on your Apple or Android phone and watch the following video before using it:     CBT-i  Introduction      Go to the Settings section and turn on the setting Working with CBT-i Provider, Record information each morning by pressing the Sleep Diary icon. Do not not watch or monitor the clock in the middle of the night while keeping your sleep diary. You can customize your sleep dairy in the Settings section of your luz to add information such as caffeine use, alcohol use or sleep medications taken.  It will be important to gather at least a week of sleep diary information before the next step of your program.         You can email your sleep diary data to yourself prior to your visit by using the [employer] User Data function found in the Settings section.  These data will be used to establish your core sleep training plan in the next module.    [facility] Sleep Diary     The [facility] Sleep Diary was developed by a team of sleep experts across the country to collect the most helpful sleep information.  This diary is available in a convenient web-based application you can use on all computer and mobile devices.  Like the CBT-I  luz, you can export and email your data to yourself and for your provider.    Copy and paste the [facility] Sleep  Diary Link into your browser:  [email or web address]        [facility] Paper Sleep Diary        Mobile and Wearable Sleep Tracking Devices    There are many wearable and mobile apps that estimate the time, amount and quality of sleep.  They do so largely by recording and analyzing your body movement. Unlike a laboratory sleep study, these devices cannot accurately measure stages of sleep.  Wearable device and mobile apps can be helpful in estimating the time and amount of sleep at night. They can be used along with your CBT-i , [facility] Sleep Diary or Paper sleep diary.    CBT-I and Sleeping Pills    Sleep medications can be helpful in the short-term but often stop working in the long-term.  Sleeping pills treat symptoms and not the underlying cause of insomnia.  They also can have side effects that may last well into the day. Abruptly stopping sleeping pills can cause temporary rebound insomnia and lead to increased distress about sleeplessness.  This in turn strengthens the belief that pills are necessary for sleep.    Many patients choose to discontinue sleeping pills prior to beginning CBT-I.  However, discontinuing sleep medication is not the goal of CBT-I.  About 50% of patients end up decreasing or discontinuing their sleep medication use. You should talk to your prescribing provider before tapering or discontinuing sleep medication.     Introductory Module Homework    Keep track of your sleep every morning  until your next visit using the CBT-i  luz, [facility] Sleep Diary web-based luz, or paper sleep diary.              MY TREATMENT INFORMATION FOR SLEEP APNEA-  Samantha Dudley    DOCTOR : CAROLINE BROWN MD    Am I having a sleep study at a sleep center?  --->Due to normal delays, you will be contacted within 2-4 weeks to schedule    Am I having a home sleep study?  --->Watch the video for the device you are using:    -/drop off device-    "https://www.youZenefitsube.com/watch?v=yGGFBdELGhk    -Disposable device sent out require phone/computer application-   https://www.youZenefitsube.com/watch?v=BCce_vbiwxE      Frequently asked questions:  1. What is Obstructive Sleep Apnea (CRISTINA)? CRISTINA is the most common type of sleep apnea. Apnea means, \"without breath.\"  Apnea is most often caused by narrowing or collapse of the upper airway as muscles relax during sleep.   Almost everyone has occasional apneas. Most people with sleep apnea have had brief interruptions at night frequently for many years.  The severity of sleep apnea is related to how frequent and severe the events are.   2. What are the consequences of CRISTINA? Symptoms include: feeling sleepy during the day, snoring loudly, gasping or stopping of breathing, trouble sleeping, and occasionally morning headaches or heartburn at night.  Sleepiness can be serious and even increase the risk of falling asleep while driving. Other health consequences may include development of high blood pressure and other cardiovascular disease in persons who are susceptible. Untreated CRISTINA  can contribute to heart disease, stroke and diabetes.   3. What are the treatment options? In most situations, sleep apnea is a lifelong disease that must be managed with daily therapy. Medications are not effective for sleep apnea and surgery is generally not considered until other therapies have been tried. Your treatment is your choice . Continuous Positive Airway (CPAP) works right away and is the therapy that is effective in nearly everyone. An oral device to hold your jaw forward is usually the next most reliable option. Other options include postioning devices (to keep you off your back), weight loss, and surgery including a tongue pacing device. There is more detail about some of these options below.  4. Are my sleep studies covered by insurance? Although we will request verification of coverage, we advise you also check in advance of the study " to ensure there is coverage.    Important tips for those choosing CPAP and similar devices  REMEMBER-IF YOU RECEIVE A CALL FROM  [phone]-->IT IS TO SETUP A DEVICE  For new devices, sign up for device LUZ to monitor your device for your followup visits  We encourage you to utilize the [employer] luz or website ( [email or web address].[email or web address] ) to monitor your therapy progress and share the data with your healthcare team when you discuss your sleep apnea.                                                    Know your equipment:  CPAP is continuous positive airway pressure that prevents obstructive sleep apnea by keeping the throat from collapsing while you are sleeping. In most cases, the device is  smart  and can slowly self-adjusts if your throat collapses and keeps a record every day of how well you are treated-this information is available to you and your care team.  BPAP is bilevel positive airway pressure that keeps your throat open and also assists each breath with a pressure boost to maintain adequate breathing.  Special kinds of BPAP are used in patients who have inadequate breathing from lung or heart disease. In most cases, the device is  smart  and can slowly self-adjusts to assist breathing. Like CPAP, the device keeps a record of how well you are treated.  Your mask is your connection to the device. You get to choose what feels most comfortable and the staff will help to make sure if fits. Here: are some examples of the different masks that are available: Magnetic mask aids may assist with use but there are safety issues that should be addressed when considering with magnets* ( see end of discussion).       Key points to remember on your journey with sleep apnea:  Sleep study.  PAP devices often need to be adjusted during a sleep study to show that they are effective and adjusted right.  Good tips to remember: Try wearing just the mask during a quiet time during the day so your body adapts to wearing  it. A humidifier is recommended for comfort in most cases to prevent drying of your nose and throat. Allergy medication from your provider may help you if you are having nasal congestion.  Getting settled-in. It takes more than one night for most of us to get used to wearing a mask. Try wearing just the mask during a quiet time during the day so your body adapts to wearing it. A humidifier is recommended for comfort in most cases. Our team will work with you carefully on the first day and will be in contact within 4 days and again at 2 and 4 weeks for advice and remote device adjustments. Your therapy is evaluated by the device each day.   Use it every night. The more you are able to sleep naturally for 7-8 hours, the more likely you will have good sleep and to prevent health risks or symptoms from sleep apnea. Even if you use it 4 hours it helps. Occasionally all of us are unable to use a medical therapy, in sleep apnea, it is not dangerous to miss one night.   Communicate. Call our skilled team on the number provided on the first day if your visit for problems that make it difficult to wear the device. Over 2 out of 3 patients can learn to wear the device long-term with help from our team. Remember to call our team or your sleep providers if you are unable to wear the device as we may have other solutions for those who cannot adapt to mask CPAP therapy. It is recommended that you sleep your sleep provider within the first 3 months and yearly after that if you are not having problems.   Use it for your health. We encourage use of CPAP masks during daytime quiet periods to allow your face and brain to adapt to the sensation of CPAP so that it will be a more natural sensation to awaken to at night or during naps. This can be very useful during the first few weeks or months of adapting to CPAP though it does not help medically to wear CPAP during wakefulness and  should not be used as a strategy just to meet  guidelines.  Take care of your equipment. Make sure you clean your mask and tubing using directions every day and that your filter and mask are replaced as recommended or if they are not working.     *Masks with magnets:  Updated Contraindications  Masks with magnetic components are contraindicated for use by patients where they, or anyone in close physical contact while using the mask, have the following:   Active medical implants that interact with magnets (i.e., pacemakers, implantable cardioverter defibrillators (ICD), neurostimulators, cerebrospinal fluid (CSF) shunts, insulin/infusion pumps)   Metallic implants/objects containing ferromagnetic material (i.e., aneurysm clips/flow disruption devices, embolic coils, stents, valves, electrodes, implants to restore hearing or balance with implanted magnets, ocular implants, metallic splinters in the eye)  Updated Warning  Keep the mask magnets at a safe distance of at least 6 inches (150 mm) away from implants or medical devices that may be adversely affected by magnetic interference. This warning applies to you or anyone in close physical contact with your mask. The magnets are in the frame and lower headgear clips, with a magnetic field strength of up to 400mT. When worn, they connect to secure the mask but may inadvertently detach while asleep.  Implants/medical devices, including those listed within contraindications, may be adversely affected if they change function under external magnetic fields or contain ferromagnetic materials that attract/repel to magnetic fields (some metallic implants, e.g., contact lenses with metal, dental implants, metallic cranial plates, screws, jordana hole covers, and bone substitute devices). Consult your physician and  of your implant / other medical device for information on the potential adverse effects of magnetic fields.    BESIDES CPAP, WHAT OTHER THERAPIES ARE THERE?    Positioning Device  Positioning devices are  generally used when sleep apnea is mild and only occurs on your back.This example shows a pillow that straps around the waist. It may be appropriate for those whose sleep study shows milder sleep apnea that occurs primarily when lying flat on one's back. Preliminary studies have shown benefit but effectiveness at home may need to be verified by a home sleep test. These devices are generally not covered by medical insurance.  Examples of devices that maintain sleeping on the back to prevent snoring and mild sleep apnea.    Belt type body positioner  http://[email or web address]    Electronic reminder  [email or web address].[email or web address]            Oral Appliance  What is oral appliance therapy?  An oral appliance device fits on your teeth at night like a retainer used after having braces. The device is made by a specialized dentist and requires several visits over 1-2 months before a manufactured device is made to fit your teeth and is adjusted to prevent your sleep apnea. Once an oral device is working properly, snoring should be improved. A home sleep test may be recommended at that time if to determine whether the sleep apnea is adequately treated.       Some things to remember:  -Oral devices are often, but not always, covered by your medical insurance. Be sure to check with your insurance provider.   -If you are referred for oral therapy, you will be given a list of specialized dentists to consider or you may choose to visit the Web site of the American Academy of Dental Sleep Medicine  -Oral devices are less likely to work if you have severe sleep apnea or are extremely overweight.     More detailed information  An oral appliance is a small acrylic device that fits over the upper and lower teeth  (similar to a retainer or a mouth guard). This device slightly moves jaw forward, which moves the base of the tongue forward, opens the airway, improves breathing for effective treat snoring and obstructive sleep apnea in perhaps 7  out of 10 people .  The best working devices are custom-made by a dental device  after a mold is made of the teeth 1, 2, 3.  When is an oral appliance indicated?  Oral appliance therapy is recommended as a first-line treatment for patients with primary snoring, mild sleep apnea, and for patients with moderate sleep apnea who prefer appliance therapy to use of CPAP4, 5. Severity of sleep apnea is determined by sleep testing and is based on the number of respiratory events per hour of sleep.   How successful is oral appliance therapy?  The success rate of oral appliance therapy in patients with mild sleep apnea is 75-80% while in patients with moderate sleep apnea it is 50-70%. The chance of success in patients with severe sleep apnea is 40-50%. The research also shows that oral appliances have a beneficial effect on the cardiovascular health of CRISTINA patients at the same magnitude as CPAP therapy7.  Oral appliances should be a second-line treatment in cases of severe sleep apnea, but if not completely successful then a combination therapy utilizing CPAP plus oral appliance therapy may be effective. Oral appliances tend to be effective in a broad range of patients although studies show that the patients who have the highest success are females, younger patients, those with milder disease, and less severe obesity. 3, 6.   Finding a dentist that practices dental sleep medicine  Specific training is available through the American Academy of Dental Sleep Medicine for dentists interested in working in the field of sleep. To find a dentist who is educated in the field of sleep and the use of oral appliances, near you, visit the Web site of the American Academy of Dental Sleep Medicine.    References  1. Vero et al. Objectively measured vs self-reported compliance during oral appliance therapy for sleep-disordered breathing. Chest 2013; 144(5): 2878-6486.  2. Kobe et al. Objective measurement of  compliance during oral appliance therapy for sleep-disordered breathing. Thorax 2013; 68(1): 91-96.  3. Leia, et al. Mandibular advancement devices in 620 men and women with CRISTINA and snoring: tolerability and predictors of treatment success. Chest 2004; 125: 6755-7040.  4. Tony et al. Oral appliances for snoring and CRISTINA: a review. Sleep 2006; 29: 244-262.  5. Melanie et al. Oral appliance treatment for CRISTINA: an update. J Clin Sleep Med 2014; 10(2): 215-227.  6. Jr et al. Predictors of OSAH treatment outcome. J Dent Res 2007; 86: 8231-6809.      Weight Loss:   Your Body mass index is 18.7 kg/m .    Being overweight does not necessarily mean you will have health consequences.  Those who have BMI over 30 or over 27 with existing medical conditions carries greater risk.   Weight loss decreases severity of sleep apnea in most people with obesity. For those with mild obesity who have developed snoring with weight gain, even 15-30 pound weight loss can improve and occasionally milder eliminate sleep apnea.  Structured and life-long dietary and health habits are necessary to lose weight and keep healthier weight levels.     The Comprehensive Weight loss program offers all aspects of weight loss strategies including two Non-Surgical Weight Loss Programs: Medical Weight Management and our 24 Week Healthy Lifestyle Program:  Medical Weight Management: You will meet with a Medical Weight Management Provider, as well as a Registered Dietician. The program may include medication therapy, dietary education, recommended exercise and physical therapy programs, monthly support group meetings, and possible psychological counseling. Follow up visits with the provider or dietician are scheduled based on your progress and needs.  24 Week Healthy Lifestyle Program: This unique program is designed to give you the support of weekly appointments and activities thru a 24-week period. It may include all of the components of  the basic program (above), with the addition of 11 individual Health  Visits, 24-week access to the [employer] website for over 700 online classes, and monthly support group meetings. This program has an out-of-pocket expense of $499 to cover the items that can not be billed to insurance (health coaches and [employer] access), and is non-refundable/non-transferable (you may be able to use a Health Savings Account; ask your HSA provider). There may be an optional meal replacement plan prescribed as well.   Medication therapy has been approved for the treatment of sleep apnea: The FDA approved tirzepatide (ZEPBOUND) for moderate to severe sleep apnea (apnea-hypopnea index greater than or equal to 15) in patients with BMI of greater than or equal to 30, or BMI greater than or equal to 27 with at least 1 weight-related condition such as hypertension or dyslipidemia.  Surgical management achieves meaningful long-term weight loss and improvement in health risks in most patients with more severe obesity.      Sleep Apnea Surgery:    Surgery for obstructive sleep apnea is considered generally only when other therapies fail to work. Surgery may be discussed with you if you are having a difficult time tolerating CPAP and or when there is an abnormal structure that requires surgical correction.  Nose and throat surgeries often enlarge the airway to prevent collapse.  Most of these surgeries create pain for 1-2 weeks and up to half of the most common surgeries are not effective throughout life.  You should carefully discuss the benefits and drawbacks to surgery with your sleep provider and surgeon to determine if it is the best solution for you.   More information  Surgery for CRISTINA is directed at areas that are responsible for narrowing or complete obstruction of the airway during sleep.  There are a wide range of procedures available to enlarge and/or stabilize the airway to prevent blockage of breathing in the three major  areas where it can occur: the palate, tongue, and nasal regions.  Successful surgical treatment depends on the accurate identification of the factors responsible for obstructive sleep apnea in each person.  A personalized approach is required because there is no single treatment that works well for everyone.  Because of anatomic variation, consultation with an examination by a sleep surgeon is a critical first step in determining what surgical options are best for each patient.  In some cases, examination during sedation may be recommended in order to guide the selection of procedures.  Patients will be counseled about risks and benefits as well as the typical recovery course after surgery. Surgery is typically not a cure for a person s CRISTINA.  However, surgery will often significantly improve one s CRISTINA severity (termed  success rate ).  Even in the absence of a cure, surgery will decrease the cardiovascular risk associated with OSA7; improve overall quality of life8 (sleepiness, functionality, sleep quality, etc).      Palate Procedures:  Patients with CRISTINA often have narrowing of their airway in the region of their tonsils and uvula.  The goals of palate procedures are to widen the airway in this region as well as to help the tissues resist collapse.  Modern palate procedure techniques focus on tissue conservation and soft tissue rearrangement, rather than tissue removal.  Often the uvula is preserved in this procedure. Residual sleep apnea is common in patient after pharyngoplasty with an average reduction in sleep apnea events of 33%2.      Tongue Procedures:  ExamWhile patients are awake, the muscles that surround the throat are active and keep this region open for breathing. These muscles relax during sleep, allowing the tongue and other structures to collapse and block breathing.  There are several different tongue procedures available.  Selection of a tongue base procedure depends on characteristics seen on  physical exam.  Generally, procedures are aimed at removing bulky tissues in this area or preventing the back of the tongue from falling back during sleep.  Success rates for tongue surgery range from 50-62%3.    Hypoglossal Nerve Stimulation:  Hypoglossal nerve stimulation has recently received approval from the [place] Food and Drug Administration for the treatment of obstructive sleep apnea.  This is based on research showing that the system was safe and effective in treating sleep apnea6.  Results showed that the median AHI score decreased 68%, from 29.3 to 9.0. This therapy uses an implant system that senses breathing patterns and delivers mild stimulation to airway muscles, which keeps the airway open during sleep.  The system consists of three fully implanted components: a small generator (similar in size to a pacemaker), a breathing sensor, and a stimulation lead.  Using a small handheld remote, a patient turns the therapy on before bed and off upon awakening.    Candidates for this device must be greater than 18 years of age, have moderate to severe obstructive sleep apnea with less than 25% central events  (AHI between 15-65), BMI less than 35, have tried CPAP/oral appliance for at least 8 weeks without success, and have appropriate upper airway anatomy (determined by a sleep endoscopy performed by Dr. Efraín Griffin or Dr. Jose Colon).     Nasal Procedures:  Nasal obstruction can interfere with nasal breathing during the day and night.  Studies have shown that relief of nasal obstruction can improve the ability of some patients to tolerate positive airway pressure therapy for obstructive sleep apnea1.  Treatment options include medications such as nasal saline, topical corticosteroid and antihistamine sprays, and oral medications such as antihistamines or decongestants. Non-surgical treatments can include external nasal dilators for selected patients. If these are not successful by themselves,  surgery can improve the nasal airway either alone or in combination with these other options.        Combination Procedures:  Combination of surgical procedures and other treatments may be recommended, particularly if patients have more than one area of narrowing or persistent positional disease.  The success rate of combination surgery ranges from 66-80%2,3.    References  Miko AYOUB. The Role of the Nose in Snoring and Obstructive Sleep Apnoea: An Update.  Eur Arch Otorhinolaryngol. 2011; 268: 1365-73.   Yumiko SM; Jonas JA; Susana JR; Pallanch JF; Remy MB; Luca SG; Claudia WYNNE. Surgical modifications of the upper airway for obstructive sleep apnea in adults: a systematic review and meta-analysis. SLEEP 2010;33(10):8469-6960. Ramila HARMON. Hypopharyngeal surgery in obstructive sleep apnea: an evidence-based medicine review.  Arch Otolaryngol Head Neck Surg. 2006 Feb;132(2):206-13.  Navneet YH1, Rober Y, Nate PETERSON. The efficacy of anatomically based multilevel surgery for obstructive sleep apnea. Otolaryngol Head Neck Surg. 2003 Oct;129(4):327-35.  Ramila HARMON, Goldberg A. Hypopharyngeal Surgery in Obstructive Sleep Apnea: An Evidence-Based Medicine Review. Arch Otolaryngol Head Neck Surg. 2006 Feb;132(2):206-13.  Elvia SMITH et al. Upper-Airway Stimulation for Obstructive Sleep Apnea.  N Engl J Med. 2014 Jan 9;370(2):139-49.  Flower Y et al. Increased Incidence of Cardiovascular Disease in Middle-aged Men with Obstructive Sleep Apnea. Am J Respir Crit Care Med; 2002 166: 159-165  Galeas EM et al. Studying Life Effects and Effectiveness of Palatopharyngoplasty (SLEEP) study: Subjective Outcomes of Isolated Uvulopalatopharyngoplasty. Otolaryngol Head Neck Surg. 2011; 144: 623-631.        WHAT IF I ONLY HAVE SNORING?    Mandibular advancement devices, lateral sleep positioning, long-term weight loss and treatment of nasal allergies have been shown to improve snoring.  Exercising tongue muscles with a game  (https://excentos.[employer]ashism/us/luz/soundly-reduce-snoring/lc0245087406) or stimulating the tongue during the day with a device (https://doi.org/10.3390/iuj23678749) have improved snoring in some individuals.  https://www.[employer].Fashism/  https://www.sleepfoundation.org/best-anti-snoring-mouthpieces-and-mouthguards    Remember to Drive Safe... Drive Alive     Sleep health profoundly affects your health, mood, and your safety.  Thirty three percent of the population (one in three of us) is not getting enough sleep and many have a sleep disorder. Not getting enough sleep or having an untreated / undertreated sleep condition may make us sleepy without even knowing it. In fact, our driving could be dramatically impaired due to our sleep health. As your provider, here are some things I would like you to know about driving:     Here are some warning signs for impairment and dangerous drowsy driving:              -Having been awake more than 16 hours               -Looking tired               -Eyelid drooping              -Head nodding (it could be too late at this point)              -Driving for more than 30 minutes     Some things you could do to make the driving safer if you are experiencing some drowsiness:              -Stop driving and rest              -Call for transportation              -Make sure your sleep disorder is adequately treated     Some things that have been shown NOT to work when experiencing drowsiness while driving:              -Turning on the radio              -Opening windows              -Eating any  distracting  /  entertaining  foods (e.g., sunflower seeds, candy, or any other)              -Talking on the phone      Your decision may not only impact your life, but also the life of others. Please, remember to drive safe for yourself and all of us.

## 2025-07-01 ENCOUNTER — APPOINTMENT (OUTPATIENT)
Dept: URBAN - METROPOLITAN AREA CLINIC 259 | Age: 45
Setting detail: DERMATOLOGY
End: 2025-07-01

## 2025-07-01 VITALS — HEIGHT: 68 IN | WEIGHT: 128 LBS

## 2025-07-01 DIAGNOSIS — L57.8 OTHER SKIN CHANGES DUE TO CHRONIC EXPOSURE TO NONIONIZING RADIATION: ICD-10-CM

## 2025-07-01 DIAGNOSIS — Z71.89 OTHER SPECIFIED COUNSELING: ICD-10-CM

## 2025-07-01 DIAGNOSIS — D18.0 HEMANGIOMA: ICD-10-CM

## 2025-07-01 DIAGNOSIS — L98.8 OTHER SPECIFIED DISORDERS OF THE SKIN AND SUBCUTANEOUS TISSUE: ICD-10-CM

## 2025-07-01 DIAGNOSIS — L82.1 OTHER SEBORRHEIC KERATOSIS: ICD-10-CM

## 2025-07-01 DIAGNOSIS — D22 MELANOCYTIC NEVI: ICD-10-CM

## 2025-07-01 DIAGNOSIS — L81.4 OTHER MELANIN HYPERPIGMENTATION: ICD-10-CM

## 2025-07-01 PROBLEM — D22.5 MELANOCYTIC NEVI OF TRUNK: Status: ACTIVE | Noted: 2025-07-01

## 2025-07-01 PROBLEM — D18.01 HEMANGIOMA OF SKIN AND SUBCUTANEOUS TISSUE: Status: ACTIVE | Noted: 2025-07-01

## 2025-07-01 PROCEDURE — OTHER REASSURANCE: OTHER

## 2025-07-01 PROCEDURE — OTHER ADDITIONAL NOTES: OTHER

## 2025-07-01 PROCEDURE — OTHER COUNSELING: OTHER

## 2025-07-01 PROCEDURE — 99213 OFFICE O/P EST LOW 20 MIN: CPT

## 2025-07-01 ASSESSMENT — LOCATION SIMPLE DESCRIPTION DERM
LOCATION SIMPLE: LEFT BREAST
LOCATION SIMPLE: RIGHT BREAST
LOCATION SIMPLE: RIGHT TEMPLE
LOCATION SIMPLE: CHEST
LOCATION SIMPLE: LEFT EYELID
LOCATION SIMPLE: RIGHT CHEEK
LOCATION SIMPLE: LEFT UPPER BACK
LOCATION SIMPLE: LEFT CHEEK
LOCATION SIMPLE: LEFT FOREARM

## 2025-07-01 ASSESSMENT — LOCATION DETAILED DESCRIPTION DERM
LOCATION DETAILED: RIGHT MEDIAL BREAST 12-1:00 REGION
LOCATION DETAILED: RIGHT MEDIAL BREAST 4-5:00 REGION
LOCATION DETAILED: LEFT SUPERIOR LATERAL MALAR CHEEK
LOCATION DETAILED: LEFT MEDIAL SUPERIOR CHEST
LOCATION DETAILED: RIGHT INFERIOR TEMPLE
LOCATION DETAILED: LEFT LATERAL CANTHUS
LOCATION DETAILED: LEFT MEDIAL BREAST 8-9:00 REGION
LOCATION DETAILED: LEFT MEDIAL UPPER BACK
LOCATION DETAILED: RIGHT MEDIAL BREAST 2-3:00 REGION
LOCATION DETAILED: RIGHT SUPERIOR LATERAL MALAR CHEEK
LOCATION DETAILED: LEFT INFERIOR CENTRAL MALAR CHEEK
LOCATION DETAILED: LEFT SUPERIOR MEDIAL UPPER BACK
LOCATION DETAILED: LEFT PROXIMAL DORSAL FOREARM

## 2025-07-01 ASSESSMENT — LOCATION ZONE DERM
LOCATION ZONE: ARM
LOCATION ZONE: EYELID
LOCATION ZONE: FACE
LOCATION ZONE: TRUNK

## 2025-08-18 ENCOUNTER — TELEPHONE (OUTPATIENT)
Dept: SLEEP MEDICINE | Facility: CLINIC | Age: 45
End: 2025-08-18
Payer: COMMERCIAL

## 2025-08-18 DIAGNOSIS — G47.33 OSA (OBSTRUCTIVE SLEEP APNEA): Primary | ICD-10-CM

## 2025-08-26 ENCOUNTER — THERAPY VISIT (OUTPATIENT)
Dept: SLEEP MEDICINE | Facility: CLINIC | Age: 45
End: 2025-08-26
Attending: INTERNAL MEDICINE
Payer: COMMERCIAL

## 2025-08-26 DIAGNOSIS — G47.33 OSA (OBSTRUCTIVE SLEEP APNEA): ICD-10-CM

## 2025-08-27 LAB — SLPCOMP: NORMAL

## 2025-09-03 LAB — SLPCOMP: NORMAL

## (undated) DEVICE — SOL WATER IRRIG 1000ML BOTTLE 07139-09

## (undated) DEVICE — GLOVE PROTEXIS W/NEU-THERA 7.5  2D73TE75

## (undated) DEVICE — SYR 10ML FINGER CONTROL W/O NDL 309695

## (undated) DEVICE — SUCTION CANNULA UTERINE 10MM CVD 022110-10

## (undated) DEVICE — KIT PROCEDURE FLUENT IN/OUT FLOWPAK TISS TRAP FLT-112S

## (undated) DEVICE — ESU HOLDER LAP INST DISP YELLOW SHORT 250MM H-PRO-250

## (undated) DEVICE — SPECIMEN TRAP VACUUM SUCTION SAFETOUCH 003853-902

## (undated) DEVICE — SEAL SET MYOSURE ROD LENS SCOPE SINGLE USE 40-902

## (undated) DEVICE — LINEN GOWN X4 5410

## (undated) DEVICE — GLOVE PROTEXIS W/NEU-THERA 6.5  2D73TE65

## (undated) DEVICE — STRAP KNEE/BODY 31143004

## (undated) DEVICE — SU MONOCRYL 4-0 PS-2 18" UND Y496G

## (undated) DEVICE — SU MONOCRYL 0 CT-1 36" Y346H

## (undated) DEVICE — GLOVE PROTEXIS BLUE W/NEU-THERA 7.0  2D73EB70

## (undated) DEVICE — DRSG ABDOMINAL 07 1/2X8" 7197D

## (undated) DEVICE — Device

## (undated) DEVICE — SUCTION VACUUM CANISTER STANDARD W/LID&CAPS 003987-901

## (undated) DEVICE — SU VICRYL 0 CT-1 36" J346H

## (undated) DEVICE — SOL NACL 0.9% IRRIG 1000ML BOTTLE 2F7124

## (undated) DEVICE — GLOVE PROTEXIS BLUE W/NEU-THERA 6.5  2D73EB65

## (undated) DEVICE — CATH TRAY FOLEY 16FR BARDEX W/DRAIN BAG STATLOCK 300316A

## (undated) DEVICE — LINEN TOWEL PACK X5 5464

## (undated) DEVICE — TUBING SUCTION VACUUM COLLECTION 6FT 610

## (undated) DEVICE — PREP CHLORAPREP W/ORANGE TINT 10.5ML 930715

## (undated) DEVICE — SUCTION CANNULA UTERINE 08MM CVD 022108-10

## (undated) DEVICE — SOL WATER IRRIG 1000ML BOTTLE 2F7114

## (undated) DEVICE — PREP CHLORAPREP 26ML TINTED ORANGE  260815

## (undated) DEVICE — DRSG STERI STRIP 1/2X4" R1547

## (undated) DEVICE — SOL NACL 0.9% IRRIG 3000ML BAG 2B7477

## (undated) DEVICE — DECANTER TRANSFER DEVICE 2008S

## (undated) DEVICE — PAD CHUX UNDERPAD 30X36" P3036C

## (undated) DEVICE — SEALANT CERVICAL OMNI LOK OLK-100

## (undated) DEVICE — SOL NACL 0.9% IRRIG 1000ML BOTTLE 07138-09

## (undated) DEVICE — GLOVE ESTEEM POWDER FREE SMT 6.5  2D72PT65

## (undated) DEVICE — TUBING SUCTION MEDI-VAC SOFT 3/16"X20' N520A

## (undated) DEVICE — STOCKING SLEEVE COMPRESSION CALF LG

## (undated) DEVICE — SPONGE LAP 18X18" 1515

## (undated) DEVICE — PACK C-SECTION LF PL15OTA83B

## (undated) DEVICE — SPECIMEN CONTAINER 5OZ STERILE 2600SA

## (undated) DEVICE — SYR 10ML LL W/O NDL 302995

## (undated) DEVICE — DRSG TELFA ISLAND 4X10"

## (undated) RX ORDER — MORPHINE SULFATE 1 MG/ML
INJECTION, SOLUTION EPIDURAL; INTRATHECAL; INTRAVENOUS
Status: DISPENSED
Start: 2021-07-01

## (undated) RX ORDER — CEFAZOLIN SODIUM 2 G/100ML
INJECTION, SOLUTION INTRAVENOUS
Status: DISPENSED
Start: 2021-07-18

## (undated) RX ORDER — FENTANYL CITRATE 50 UG/ML
INJECTION, SOLUTION INTRAMUSCULAR; INTRAVENOUS
Status: DISPENSED
Start: 2021-07-14

## (undated) RX ORDER — FENTANYL CITRATE 50 UG/ML
INJECTION, SOLUTION INTRAMUSCULAR; INTRAVENOUS
Status: DISPENSED
Start: 2021-07-18

## (undated) RX ORDER — ACETAMINOPHEN 325 MG/1
TABLET ORAL
Status: DISPENSED
Start: 2021-07-02

## (undated) RX ORDER — OXYTOCIN/0.9 % SODIUM CHLORIDE 30/500 ML
PLASTIC BAG, INJECTION (ML) INTRAVENOUS
Status: DISPENSED
Start: 2021-07-02

## (undated) RX ORDER — TRIAMCINOLONE ACETONIDE 40 MG/ML
INJECTION, SUSPENSION INTRA-ARTICULAR; INTRAMUSCULAR
Status: DISPENSED
Start: 2021-07-14

## (undated) RX ORDER — BUPIVACAINE HYDROCHLORIDE 2.5 MG/ML
INJECTION, SOLUTION EPIDURAL; INFILTRATION; INTRACAUDAL
Status: DISPENSED
Start: 2021-07-14

## (undated) RX ORDER — ONDANSETRON 2 MG/ML
INJECTION INTRAMUSCULAR; INTRAVENOUS
Status: DISPENSED
Start: 2021-07-18

## (undated) RX ORDER — FENTANYL CITRATE 50 UG/ML
INJECTION, SOLUTION INTRAMUSCULAR; INTRAVENOUS
Status: DISPENSED
Start: 2021-07-19

## (undated) RX ORDER — METHYLERGONOVINE MALEATE 0.2 MG/ML
INJECTION INTRAVENOUS
Status: DISPENSED
Start: 2021-07-19

## (undated) RX ORDER — LIDOCAINE HYDROCHLORIDE 10 MG/ML
INJECTION, SOLUTION EPIDURAL; INFILTRATION; INTRACAUDAL; PERINEURAL
Status: DISPENSED
Start: 2021-07-19

## (undated) RX ORDER — HYDROMORPHONE HCL IN WATER/PF 6 MG/30 ML
PATIENT CONTROLLED ANALGESIA SYRINGE INTRAVENOUS
Status: DISPENSED
Start: 2021-07-19

## (undated) RX ORDER — NITROGLYCERIN 5 MG/ML
VIAL (ML) INTRAVENOUS
Status: DISPENSED
Start: 2021-07-18

## (undated) RX ORDER — HEPARIN SODIUM 200 [USP'U]/100ML
INJECTION, SOLUTION INTRAVENOUS
Status: DISPENSED
Start: 2021-07-18

## (undated) RX ORDER — LIDOCAINE HYDROCHLORIDE 10 MG/ML
INJECTION, SOLUTION EPIDURAL; INFILTRATION; INTRACAUDAL; PERINEURAL
Status: DISPENSED
Start: 2021-07-18

## (undated) RX ORDER — PHENYLEPHRINE HCL IN 0.9% NACL 50MG/250ML
PLASTIC BAG, INJECTION (ML) INTRAVENOUS
Status: DISPENSED
Start: 2021-07-01

## (undated) RX ORDER — LIDOCAINE HYDROCHLORIDE 10 MG/ML
INJECTION, SOLUTION EPIDURAL; INFILTRATION; INTRACAUDAL; PERINEURAL
Status: DISPENSED
Start: 2021-07-14

## (undated) RX ORDER — OXYTOCIN/0.9 % SODIUM CHLORIDE 30/500 ML
PLASTIC BAG, INJECTION (ML) INTRAVENOUS
Status: DISPENSED
Start: 2021-07-01

## (undated) RX ORDER — ONDANSETRON 2 MG/ML
INJECTION INTRAMUSCULAR; INTRAVENOUS
Status: DISPENSED
Start: 2021-07-01

## (undated) RX ORDER — IODIXANOL 320 MG/ML
INJECTION, SOLUTION INTRAVASCULAR
Status: DISPENSED
Start: 2021-07-19

## (undated) RX ORDER — PROPOFOL 10 MG/ML
INJECTION, EMULSION INTRAVENOUS
Status: DISPENSED
Start: 2021-07-19

## (undated) RX ORDER — KETOROLAC TROMETHAMINE 30 MG/ML
INJECTION, SOLUTION INTRAMUSCULAR; INTRAVENOUS
Status: DISPENSED
Start: 2021-07-19

## (undated) RX ORDER — TRANEXAMIC ACID 10 MG/ML
INJECTION, SOLUTION INTRAVENOUS
Status: DISPENSED
Start: 2021-07-01

## (undated) RX ORDER — KETOROLAC TROMETHAMINE 30 MG/ML
INJECTION, SOLUTION INTRAMUSCULAR; INTRAVENOUS
Status: DISPENSED
Start: 2021-07-01